# Patient Record
Sex: MALE | Race: WHITE | NOT HISPANIC OR LATINO | Employment: OTHER | URBAN - METROPOLITAN AREA
[De-identification: names, ages, dates, MRNs, and addresses within clinical notes are randomized per-mention and may not be internally consistent; named-entity substitution may affect disease eponyms.]

---

## 2017-01-24 ENCOUNTER — TRANSFERRED RECORDS (OUTPATIENT)
Dept: HEALTH INFORMATION MANAGEMENT | Facility: CLINIC | Age: 62
End: 2017-01-24

## 2017-03-24 DIAGNOSIS — K21.9 GASTROESOPHAGEAL REFLUX DISEASE WITHOUT ESOPHAGITIS: ICD-10-CM

## 2017-03-24 NOTE — TELEPHONE ENCOUNTER
omeprazole (PRILOSEC) 20 MG capsule      Last Written Prescription Date: 04-  Last Fill Quantity: 90,  # refills: 3   Last Office Visit with [facility], [facility] or [facility] prescribing provider: 11-

## 2017-03-27 NOTE — TELEPHONE ENCOUNTER
Prescription approved per [facility] Refill Protocol.  Madeleine Blevins, RN  Triage Nurse

## 2017-05-26 DIAGNOSIS — I10 ESSENTIAL HYPERTENSION: ICD-10-CM

## 2017-05-26 NOTE — TELEPHONE ENCOUNTER
lisinopril (PRINIVIL,ZESTRIL) 10 MG tablet      Last Written Prescription Date: 04-  Last Fill Quantity: 90, # refills: 3  Last Office Visit with G, P or [facility] prescribing provider: 11-       Potassium   Date Value Ref Range Status   04/27/2016 3.9 3.4 - 5.3 mmol/L Final     Creatinine   Date Value Ref Range Status   04/27/2016 0.83 0.66 - 1.25 mg/dL Final     BP Readings from Last 3 Encounters:   11/15/16 110/72   10/18/16 110/72   04/27/16 124/76

## 2017-05-31 RX ORDER — LISINOPRIL 10 MG/1
10 TABLET ORAL DAILY
Qty: 30 TABLET | Refills: 0 | Status: SHIPPED | OUTPATIENT
Start: 2017-05-31 | End: 2017-07-16

## 2017-05-31 NOTE — TELEPHONE ENCOUNTER
Medication is being filled for 1 time refill only due to:  Patient needs labs potassium and creatinine-future orders placed. Please call pt to schedule lab only appt.     Crystal Vasquez RN

## 2017-06-08 DIAGNOSIS — I10 ESSENTIAL HYPERTENSION: ICD-10-CM

## 2017-06-08 LAB
ALBUMIN SERPL-MCNC: 3.8 G/DL (ref 3.4–5)
ALP SERPL-CCNC: 62 U/L (ref 40–150)
ALT SERPL W P-5'-P-CCNC: 27 U/L (ref 0–70)
ANION GAP SERPL CALCULATED.3IONS-SCNC: 9 MMOL/L (ref 3–14)
AST SERPL W P-5'-P-CCNC: 26 U/L (ref 0–45)
BILIRUB SERPL-MCNC: 0.5 MG/DL (ref 0.2–1.3)
BUN SERPL-MCNC: 13 MG/DL (ref 7–30)
CALCIUM SERPL-MCNC: 8.4 MG/DL (ref 8.5–10.1)
CHLORIDE SERPL-SCNC: 106 MMOL/L (ref 94–109)
CO2 SERPL-SCNC: 24 MMOL/L (ref 20–32)
CREAT SERPL-MCNC: 0.83 MG/DL (ref 0.66–1.25)
GFR SERPL CREATININE-BSD FRML MDRD: ABNORMAL ML/MIN/1.7M2
GLUCOSE SERPL-MCNC: 99 MG/DL (ref 70–99)
POTASSIUM SERPL-SCNC: 4 MMOL/L (ref 3.4–5.3)
PROT SERPL-MCNC: 7.7 G/DL (ref 6.8–8.8)
SODIUM SERPL-SCNC: 139 MMOL/L (ref 133–144)

## 2017-06-08 PROCEDURE — 80053 COMPREHEN METABOLIC PANEL: CPT | Performed by: INTERNAL MEDICINE

## 2017-06-08 PROCEDURE — 36415 COLL VENOUS BLD VENIPUNCTURE: CPT | Performed by: INTERNAL MEDICINE

## 2017-06-13 ENCOUNTER — ALLIED HEALTH/NURSE VISIT (OUTPATIENT)
Dept: NURSING | Facility: CLINIC | Age: 62
End: 2017-06-13
Payer: COMMERCIAL

## 2017-06-13 DIAGNOSIS — Z23 NEED FOR DTAP VACCINE: Primary | ICD-10-CM

## 2017-06-13 PROCEDURE — 99207 ZZC NO CHARGE NURSE ONLY: CPT

## 2017-06-13 PROCEDURE — 90715 TDAP VACCINE 7 YRS/> IM: CPT

## 2017-06-13 PROCEDURE — 90471 IMMUNIZATION ADMIN: CPT

## 2017-06-13 NOTE — PROGRESS NOTES
Screening Questionnaire for Adult Immunization    Are you sick today?   No   Do you have allergies to medications, food, a vaccine component or latex?   No   Have you ever had a serious reaction after receiving a vaccination?   No   Do you have a long-term health problem with heart disease, lung disease, asthma, kidney disease, metabolic disease (e.g. diabetes), anemia, or other blood disorder?   No   Do you have cancer, leukemia, HIV/AIDS, or any other immune system problem?   No   In the past 3 months, have you taken medications that affect  your immune system, such as prednisone, other steroids, or anticancer drugs; drugs for the treatment of rheumatoid arthritis, Crohn s disease, or psoriasis; or have you had radiation treatments?   No   Have you had a seizure, or a brain or other nervous system problem?   No   During the past year, have you received a transfusion of blood or blood     products, or been given immune (gamma) globulin or antiviral drug?   No   For women: Are you pregnant or is there a chance you could become        pregnant during the next month?   No   Have you received any vaccinations in the past 4 weeks?   No     Immunization questionnaire answers were all negative.      MNVFC doesn't apply on this patient    Per orders of Dr. Chris, injection of adacel given by Shalini Owens. Patient instructed to remain in clinic for 20 minutes afterwards, and to report any adverse reaction to me immediately.       Screening performed by Shalini Owens on 6/13/2017 at 8:43 AM.

## 2017-06-13 NOTE — MR AVS SNAPSHOT
After Visit Summary   6/13/2017    Manuel Schofield    MRN: [id number]           Patient Information     Date Of Birth          1955        Visit Information        Provider Department      6/13/2017 8:30 AM ABHIJEET NURSE [facility] Cheryl        Today's Diagnoses     Need for DTaP vaccine    -  1       Follow-ups after your visit        Who to contact     If you have questions or need follow up information about today's clinic visit or your schedule please contact [facility] directly at [phone].  Normal or non-critical lab and imaging results will be communicated to you by [employer], letter or phone within 4 business days after the clinic has received the results. If you do not hear from us within 7 days, please contact the clinic through [employer] or phone. If you have a critical or abnormal lab result, we will notify you by phone as soon as possible.  Submit refill requests through [employer] or call your pharmacy and they will forward the refill request to us. Please allow 3 business days for your refill to be completed.          Additional Information About Your Visit        MyChart Information     [employer] gives you secure access to your electronic health record. If you see a primary care provider, you can also send messages to your care team and make appointments. If you have questions, please call your primary care clinic.  If you do not have a primary care provider, please call [phone] and they will assist you.        Care EveryWhere ID     This is your Care EveryWhere ID. This could be used by other organizations to access your [facility] medical records  [id number]         Blood Pressure from Last 3 Encounters:   11/15/16 110/72   10/18/16 110/72   04/27/16 124/76    Weight from Last 3 Encounters:   11/15/16 254 lb (115.2 kg)   10/18/16 256 lb (116.1 kg)   04/27/16 251 lb 6.4 oz (114 kg)              We Performed the Following     TDAP VACCINE (ADACEL)        Primary Care  Provider Office Phone # Fax #    Vinod Chris [phone]       [facility] [address] [place] [address]        Thank you!     [facility]. Our goal is always to provide you with excellent care. Hearing back from our patients is one way we can continue to improve our services. Please take a few minutes to complete the written survey that you may receive in the mail after your visit with us. Thank you!             Your Updated Medication List - Protect others around you: Learn how to safely use, store and throw away your medicines at www.disposemymeds.org.          This list is accurate as of: 6/13/17  8:46 AM.  Always use your most recent med list.                   Brand Name Dispense Instructions for use    ALLEGRA PO          levothyroxine 200 MCG tablet    SYNTHROID/LEVOTHROID    90 tablet    Take 1 tablet (200 mcg) by mouth daily       lisinopril 10 MG tablet    PRINIVIL/ZESTRIL    30 tablet    Take 1 tablet (10 mg) by mouth daily       mesalamine 500 MG Cpcr CR capsule    PENTASA    540 capsule    Take 3 capsules (1,500 mg) by mouth 2 times daily       omeprazole 20 MG CR capsule    priLOSEC    90 capsule    Take 1 capsule (20 mg) by mouth daily

## 2017-07-16 DIAGNOSIS — I10 ESSENTIAL HYPERTENSION: ICD-10-CM

## 2017-07-16 NOTE — TELEPHONE ENCOUNTER
lisinopril (PRINIVIL/ZESTRIL) 10 MG tablet      Last Written Prescription Date: 05/31/2017  Last Fill Quantity: 30, # refills: 0  Last Office Visit with G, [facility] or [facility] prescribing provider: 11/15/2016         Potassium   Date Value Ref Range Status   06/08/2017 4.0 3.4 - 5.3 mmol/L Final     Creatinine   Date Value Ref Range Status   06/08/2017 0.83 0.66 - 1.25 mg/dL Final     BP Readings from Last 3 Encounters:   11/15/16 110/72   10/18/16 110/72   04/27/16 124/76

## 2017-07-17 RX ORDER — LISINOPRIL 10 MG/1
TABLET ORAL
Qty: 30 TABLET | Refills: 3 | Status: SHIPPED | OUTPATIENT
Start: 2017-07-17 | End: 2017-07-18

## 2017-07-17 NOTE — TELEPHONE ENCOUNTER
Prescription approved per [facility] Refill Protocol.    Due to see PCP 11/2017.    Chantell Correia, MSN, RN-BC  Care Coordinator

## 2017-07-18 DIAGNOSIS — I10 ESSENTIAL HYPERTENSION: ICD-10-CM

## 2017-07-18 NOTE — TELEPHONE ENCOUNTER
*PT REQUESTING A 90 DAY RX    lisinopril (PRINIVIL/ZESTRIL) 10 MG tablet      Last Written Prescription Date: 7/17/2017  Last Fill Quantity: 30, # refills: 3  Last Office Visit with G, [facility] or [facility] prescribing provider: 11/15/2016       Potassium   Date Value Ref Range Status   06/08/2017 4.0 3.4 - 5.3 mmol/L Final     Creatinine   Date Value Ref Range Status   06/08/2017 0.83 0.66 - 1.25 mg/dL Final     BP Readings from Last 3 Encounters:   11/15/16 110/72   10/18/16 110/72   04/27/16 124/76

## 2017-07-19 RX ORDER — LISINOPRIL 10 MG/1
10 TABLET ORAL DAILY
Qty: 90 TABLET | Refills: 0 | Status: SHIPPED | OUTPATIENT
Start: 2017-07-19 | End: 2017-08-30

## 2017-07-19 NOTE — TELEPHONE ENCOUNTER
Will forward to Dr. Jackson to see if she will give a 90 day refill?  Looks like at the last ov on 11/15/16,  Pt was to return in 2 weeks, has not been back.  Please advise.

## 2017-07-20 NOTE — TELEPHONE ENCOUNTER
Rx changed to 90 days. Due for office visit follow-up.    Carli Jackson MD  Internal Medicine/Pediatrics

## 2017-08-07 DIAGNOSIS — K52.9 INFLAMMATORY BOWEL DISEASE: ICD-10-CM

## 2017-08-07 NOTE — TELEPHONE ENCOUNTER
mesalamine (PENTASA) 500 MG CPCR      Last Written Prescription Date:  4/27/2016  Last Fill Quantity: 540,   # refills: 3  Last Office Visit with [facility], [facility] or [facility] prescribing provider: 11/15/2016  Future Office visit:       Routing refill request to provider for review/approval because:  Drug not on the [facility], [facility] or [facility] refill protocol or controlled substance

## 2017-08-08 RX ORDER — MESALAMINE 500 MG/1
CAPSULE ORAL
Qty: 180 CAPSULE | Refills: 0 | Status: SHIPPED | OUTPATIENT
Start: 2017-08-08 | End: 2017-08-30

## 2017-08-08 NOTE — TELEPHONE ENCOUNTER
Mesalamine is on refill protocol. CBC, TSH & physical is overdue. Will refill x 1 and update patient via [employer] message.        Lab Results   Component Value Date    ALT 27 06/08/2017     Creatinine   Date Value Ref Range Status   06/08/2017 0.83 0.66 - 1.25 mg/dL Final     Lab Results   Component Value Date    WBC 8.6 04/27/2016     Lab Results   Component Value Date    RBC 5.19 04/27/2016     Lab Results   Component Value Date    HGB 15.3 04/27/2016     Lab Results   Component Value Date    HCT 44.8 04/27/2016     No components found for: MCT  Lab Results   Component Value Date    MCV 86 04/27/2016     Lab Results   Component Value Date    MCH 29.5 04/27/2016     Lab Results   Component Value Date    MCHC 34.2 04/27/2016     Lab Results   Component Value Date    RDW 12.8 04/27/2016     Lab Results   Component Value Date     04/27/2016

## 2017-08-12 DIAGNOSIS — E89.0 POSTOPERATIVE HYPOTHYROIDISM: ICD-10-CM

## 2017-08-14 NOTE — TELEPHONE ENCOUNTER
levothyroxine (SYNTHROID, LEVOTHROID) 200 MCG tablet     Last Written Prescription Date: 9/19/2016  Last Quantity: 90, # refills: 3  Last Office Visit with [facility], [facility] or [facility] prescribing provider: 11/15/2016        TSH   Date Value Ref Range Status   04/27/2016 1.64 0.40 - 4.00 mU/L Final

## 2017-08-15 RX ORDER — LEVOTHYROXINE SODIUM 200 UG/1
TABLET ORAL
Qty: 30 TABLET | Refills: 0 | Status: SHIPPED | OUTPATIENT
Start: 2017-08-15 | End: 2017-08-30

## 2017-08-15 NOTE — TELEPHONE ENCOUNTER
Medication is being filled for 1 time refill only due to:  Patient needs labs TSH and PE..     Patient scheduled to see pcp 8/30.    Nikia Whitten RN

## 2017-08-30 ENCOUNTER — OFFICE VISIT (OUTPATIENT)
Dept: PEDIATRICS | Facility: CLINIC | Age: 62
End: 2017-08-30
Payer: COMMERCIAL

## 2017-08-30 VITALS
BODY MASS INDEX: 36.66 KG/M2 | DIASTOLIC BLOOD PRESSURE: 68 MMHG | OXYGEN SATURATION: 97 % | SYSTOLIC BLOOD PRESSURE: 112 MMHG | WEIGHT: 256.1 LBS | HEART RATE: 64 BPM | HEIGHT: 70 IN | RESPIRATION RATE: 14 BRPM

## 2017-08-30 DIAGNOSIS — I10 ESSENTIAL HYPERTENSION: ICD-10-CM

## 2017-08-30 DIAGNOSIS — E89.0 POSTOPERATIVE HYPOTHYROIDISM: ICD-10-CM

## 2017-08-30 DIAGNOSIS — Z12.5 ENCOUNTER FOR SCREENING FOR MALIGNANT NEOPLASM OF PROSTATE: ICD-10-CM

## 2017-08-30 DIAGNOSIS — G71.09 HEREDITARY PROGRESSIVE MUSCULAR DYSTROPHY (H): ICD-10-CM

## 2017-08-30 DIAGNOSIS — K52.9 INFLAMMATORY BOWEL DISEASE: ICD-10-CM

## 2017-08-30 DIAGNOSIS — Z80.42 FAMILY HISTORY OF PROSTATE CANCER: ICD-10-CM

## 2017-08-30 DIAGNOSIS — K21.9 GASTROESOPHAGEAL REFLUX DISEASE WITHOUT ESOPHAGITIS: ICD-10-CM

## 2017-08-30 DIAGNOSIS — M25.562 LEFT KNEE PAIN, UNSPECIFIED CHRONICITY: ICD-10-CM

## 2017-08-30 DIAGNOSIS — Z00.01 ENCOUNTER FOR ROUTINE ADULT HEALTH EXAMINATION WITH ABNORMAL FINDINGS: Primary | ICD-10-CM

## 2017-08-30 LAB
ALBUMIN SERPL-MCNC: 3.7 G/DL (ref 3.4–5)
ALP SERPL-CCNC: 53 U/L (ref 40–150)
ALT SERPL W P-5'-P-CCNC: 29 U/L (ref 0–70)
ANION GAP SERPL CALCULATED.3IONS-SCNC: 9 MMOL/L (ref 3–14)
AST SERPL W P-5'-P-CCNC: 23 U/L (ref 0–45)
BASOPHILS # BLD AUTO: 0.1 10E9/L (ref 0–0.2)
BASOPHILS NFR BLD AUTO: 0.9 %
BILIRUB SERPL-MCNC: 0.6 MG/DL (ref 0.2–1.3)
BUN SERPL-MCNC: 9 MG/DL (ref 7–30)
CALCIUM SERPL-MCNC: 8.6 MG/DL (ref 8.5–10.1)
CHLORIDE SERPL-SCNC: 105 MMOL/L (ref 94–109)
CHOLEST SERPL-MCNC: 176 MG/DL
CO2 SERPL-SCNC: 24 MMOL/L (ref 20–32)
CREAT SERPL-MCNC: 0.9 MG/DL (ref 0.66–1.25)
DIFFERENTIAL METHOD BLD: NORMAL
EOSINOPHIL # BLD AUTO: 0.3 10E9/L (ref 0–0.7)
EOSINOPHIL NFR BLD AUTO: 3.6 %
ERYTHROCYTE [DISTWIDTH] IN BLOOD BY AUTOMATED COUNT: 12.7 % (ref 10–15)
GFR SERPL CREATININE-BSD FRML MDRD: 85 ML/MIN/1.7M2
GLUCOSE SERPL-MCNC: 104 MG/DL (ref 70–99)
HCT VFR BLD AUTO: 45.1 % (ref 40–53)
HDLC SERPL-MCNC: 42 MG/DL
HGB BLD-MCNC: 15.4 G/DL (ref 13.3–17.7)
LDLC SERPL CALC-MCNC: 109 MG/DL
LYMPHOCYTES # BLD AUTO: 1.7 10E9/L (ref 0.8–5.3)
LYMPHOCYTES NFR BLD AUTO: 19 %
MCH RBC QN AUTO: 29.8 PG (ref 26.5–33)
MCHC RBC AUTO-ENTMCNC: 34.1 G/DL (ref 31.5–36.5)
MCV RBC AUTO: 87 FL (ref 78–100)
MONOCYTES # BLD AUTO: 1 10E9/L (ref 0–1.3)
MONOCYTES NFR BLD AUTO: 11.4 %
NEUTROPHILS # BLD AUTO: 5.7 10E9/L (ref 1.6–8.3)
NEUTROPHILS NFR BLD AUTO: 65.1 %
NONHDLC SERPL-MCNC: 134 MG/DL
PLATELET # BLD AUTO: 242 10E9/L (ref 150–450)
POTASSIUM SERPL-SCNC: 4.1 MMOL/L (ref 3.4–5.3)
PROT SERPL-MCNC: 7.7 G/DL (ref 6.8–8.8)
PSA SERPL-ACNC: 0.67 UG/L (ref 0–4)
RBC # BLD AUTO: 5.16 10E12/L (ref 4.4–5.9)
SODIUM SERPL-SCNC: 138 MMOL/L (ref 133–144)
TRIGL SERPL-MCNC: 123 MG/DL
TSH SERPL DL<=0.005 MIU/L-ACNC: 1.79 MU/L (ref 0.4–4)
WBC # BLD AUTO: 8.7 10E9/L (ref 4–11)

## 2017-08-30 PROCEDURE — 80061 LIPID PANEL: CPT | Performed by: INTERNAL MEDICINE

## 2017-08-30 PROCEDURE — G0103 PSA SCREENING: HCPCS | Performed by: INTERNAL MEDICINE

## 2017-08-30 PROCEDURE — 99396 PREV VISIT EST AGE 40-64: CPT | Performed by: INTERNAL MEDICINE

## 2017-08-30 PROCEDURE — 80050 GENERAL HEALTH PANEL: CPT | Performed by: INTERNAL MEDICINE

## 2017-08-30 PROCEDURE — 36415 COLL VENOUS BLD VENIPUNCTURE: CPT | Performed by: INTERNAL MEDICINE

## 2017-08-30 RX ORDER — LEVOTHYROXINE SODIUM 200 UG/1
200 TABLET ORAL DAILY
Qty: 90 TABLET | Refills: 3 | Status: SHIPPED | OUTPATIENT
Start: 2017-08-30 | End: 2018-09-19

## 2017-08-30 RX ORDER — LISINOPRIL 10 MG/1
10 TABLET ORAL DAILY
Qty: 90 TABLET | Refills: 3 | Status: SHIPPED | OUTPATIENT
Start: 2017-08-30 | End: 2018-09-19

## 2017-08-30 RX ORDER — MESALAMINE 500 MG/1
1500 CAPSULE, EXTENDED RELEASE ORAL 2 TIMES DAILY
Qty: 540 CAPSULE | Refills: 3 | Status: ON HOLD | OUTPATIENT
Start: 2017-08-30 | End: 2021-03-08

## 2017-08-30 NOTE — PROGRESS NOTES
SUBJECTIVE:   CC: Manuel Schofield is an 62 year old male who presents for preventative health visit.     Physical   Annual:     Getting at least 3 servings of Calcium per day::  NO    Bi-annual eye exam::  NO    Dental care twice a year::  Yes    Sleep apnea or symptoms of sleep apnea::  Daytime drowsiness and Excessive snoring    Diet::  Regular (no restrictions)    Frequency of exercise::  None    Taking medications regularly::  Yes    Medication side effects::  None    Additional concerns today::  YES    Concerns:  1) Patient also would like to discuss left knee pain - has been bothering him the last week or two.      Worse with activity      Today's PHQ-2 Score:   PHQ-2 ( 1999 Pf[employer]) 8/27/2017   Q1: Little interest or pleasure in doing things 0   Q2: Feeling down, depressed or hopeless 1   PHQ-2 Score 1   Q1: Little interest or pleasure in doing things Not at all   Q2: Feeling down, depressed or hopeless Several days   PHQ-2 Score 1       Abuse: Current or Past(Physical, Sexual or Emotional)- No  Do you feel safe in your environment - Yes    Social History   Substance Use Topics     Smoking status: Former Smoker     Packs/day: 1.00     Years: 40.00     Types: Cigarettes     Smokeless tobacco: Never Used     Alcohol use 0.0 oz/week     0 Standard drinks or equivalent per week      Comment: rare     The patient does not drink >3 drinks per day nor >7 drinks per week.    Last PSA:   PSA   Date Value Ref Range Status   08/30/2017 0.67 0 - 4 ug/L Final     Comment:     Assay Method:  Chemiluminescence using Siemens Vista analyzer       Reviewed orders with patient. Reviewed health maintenance and updated orders accordingly - Yes    Reviewed and updated as needed this visit by clinical staff  Tobacco  Allergies  Meds  Med Hx  Surg Hx  Fam Hx  Soc Hx      Patient Active Problem List   Diagnosis     Hereditary progressive muscular dystrophy (H)     Tinnitus     CARDIOVASCULAR SCREENING; LDL GOAL LESS THAN 130      Health Care Home     Advanced directives, counseling/discussion     Family history of prostate cancer     Obesity     Inflammatory bowel disease     Postoperative hypothyroidism     Gastroesophageal reflux disease without esophagitis     Benign essential hypertension     Past Medical History:   Diagnosis Date     Chest pain, unspecified 10/04    adenosine cardiolyte:ECG negative, no ischemia, EF 59%     Esophageal reflux     EGD 2005 normal     Hereditary progressive muscular dystrophy (H)      Internal hemorrhoids without mention of complication      Unspecified tinnitus     chronic, ENT evaluation       Past Surgical History:   Procedure Laterality Date     APPENDECTOMY       CHOLECYSTECTOMY       COLONOSCOPY  7/8/2013    Colonoscopy Dr. Pruitt [facility]     COLONOSCOPY  7/8/2013    Procedure: COLONOSCOPY;  Colonoscopy ;  Surgeon: Vinod Pruitt MD;  Location:  GI     ENT SURGERY      T&A as a child     HC THYROID LOBECTOMY,UNILAT  1990     HC THYROID LOBECTOMY,UNILAT  2008    resection goiter       Family History   Problem Relation Age of Onset     HEART DISEASE Mother      Cancer - colorectal Mother      Alzheimer Disease Father      Neurologic Disorder Father      GASTROINTESTINAL DISEASE Brother      colon polyps     DIABETES Sister      DIABETES Brother        ALLERGIES     Allergies   Allergen Reactions     Peanut-Derived      Seasonal Allergies        ROS:  C: NEGATIVE for fever, chills, change in weight  I: NEGATIVE for worrisome rashes, moles or lesions  E: NEGATIVE for vision changes or irritation  ENT: NEGATIVE for ear, mouth and throat problems  R: NEGATIVE for significant cough or SOB  CV: NEGATIVE for chest pain, palpitations or peripheral edema  GI: NEGATIVE for nausea, abdominal pain, heartburn, or change in bowel habits   male: negative for dysuria, hematuria, decreased urinary stream, erectile dysfunction, urethral discharge  M: see hpi  N: chronic upper extrem weakness, stable  P: NEGATIVE  "for changes in mood or affect    Current Outpatient Prescriptions   Medication Sig Dispense Refill     mesalamine (PENTASA) 500 MG CPCR CR capsule Take 3 capsules (1,500 mg) by mouth 2 times daily 540 capsule 3     lisinopril (PRINIVIL/ZESTRIL) 10 MG tablet Take 1 tablet (10 mg) by mouth daily 90 tablet 3     omeprazole (PRILOSEC) 20 MG CR capsule Take 1 capsule (20 mg) by mouth daily 90 capsule 3     levothyroxine (SYNTHROID/LEVOTHROID) 200 MCG tablet TAKE 1 TABLET BY MOUTH EVERY DAY 30 tablet 0     Fexofenadine HCl (ALLEGRA PO)          OBJECTIVE:   /68 (BP Location: Right arm, Patient Position: Chair, Cuff Size: Adult Regular)  Pulse 64  Resp 14  Ht 5' 9.75\" (1.772 m)  Wt 256 lb 1.6 oz (116.2 kg)  SpO2 97%  BMI 37.01 kg/m2    EXAM:  GENERAL: alert and no distress  EYES: Eyes grossly normal to inspection, PERRL and conjunctivae and sclerae normal  HENT: ear canals and TM's normal, nose and mouth without ulcers or lesions  NECK: no adenopathy, no asymmetry, masses, or scars and thyroid normal to palpation  RESP: lungs clear to auscultation - no rales, rhonchi or wheezes  CV: regular rate and rhythm, normal S1 S2, no S3 or S4, no murmur, click or rub, no peripheral edema   ABDOMEN: soft, nontender, no hepatosplenomegaly, no masses and bowel sounds normal  MS: b/l upper extrem atrophic changes  SKIN: no suspicious lesions or rashes  NEURO: b/l upper extrem neuromuscular weakness, mainly affects fingers/hands/wrists  PSYCH: mentation appears normal, affect normal/bright    ASSESSMENT/PLAN:       ICD-10-CM    1. Encounter for routine adult health examination with abnormal findings Z00.01        2. Hereditary progressive muscular dystrophy (H) G71.0 Neurologic deficits have been stable with little progression     3. Essential hypertension I10 lisinopril (PRINIVIL/ZESTRIL) 10 MG tablet     Comprehensive metabolic panel     Lipid panel reflex to direct LDL      Adequate control.  Continue current regimen " "without changes.      4. Inflammatory bowel disease K52.9 mesalamine (PENTASA) 500 MG CPCR CR capsule     CBC with platelets and differential      In remission, on Pentasa     5. Postoperative hypothyroidism E89.0 TSH with free T4 reflex     levothyroxine (SYNTHROID/LEVOTHROID) 200 MCG tablet         Lab Results   Component Value Date    TSH 1.79 08/30/2017      Adequate control.  Continue current regimen without changes.      6. Gastroesophageal reflux disease without esophagitis K21.9 omeprazole (PRILOSEC) 20 MG CR capsule        Sx well controlled, continue PPI     7. Left knee pain, unspecified chronicity M25.562 ORTHO  REFERRAL        rec course of PT, patient declines/requests referral to orthopedics     8. Family history of prostate cancer Z80.42    9. Encounter for screening for malignant neoplasm of prostate  Z12.5 PSA, screen       COUNSELING:   Reviewed preventive health counseling, as reflected in patient instructions       Regular exercise       Healthy diet/nutrition       Colon cancer screening       Prostate cancer screening       reports that he has quit smoking. His smoking use included Cigarettes. He has a 40.00 pack-year smoking history. He has never used smokeless tobacco.      Estimated body mass index is 37.01 kg/(m^2) as calculated from the following:    Height as of this encounter: 5' 9.75\" (1.772 m).    Weight as of this encounter: 256 lb 1.6 oz (116.2 kg).   Weight management plan: Discussed healthy diet and exercise guidelines and patient will follow up in 12 months in clinic to re-evaluate.    Counseling Resources:  ATP IV Guidelines  Pooled Cohorts Equation Calculator  FRAX Risk Assessment  ICSI Preventive Guidelines  Dietary Guidelines for Americans, 2010  USDA's MyPlate  ASA Prophylaxis  Lung CA Screening    Vinod Chris MD, MD  [facility]  "

## 2017-08-30 NOTE — NURSING NOTE
"Chief Complaint   Patient presents with     Physical       Initial /68 (BP Location: Right arm, Patient Position: Chair, Cuff Size: Adult Regular)  Pulse 64  Resp 14  Ht 5' 9.75\" (1.772 m)  Wt 256 lb 1.6 oz (116.2 kg)  SpO2 97%  BMI 37.01 kg/m2 Estimated body mass index is 37.01 kg/(m^2) as calculated from the following:    Height as of this encounter: 5' 9.75\" (1.772 m).    Weight as of this encounter: 256 lb 1.6 oz (116.2 kg).  Medication Reconciliation: complete  Salome Archer CMA  "

## 2017-08-30 NOTE — MR AVS SNAPSHOT
After Visit Summary   8/30/2017    Manuel Schofield    MRN: [id number]           Patient Information     Date Of Birth          1955        Visit Information        Provider Department      8/30/2017 7:00 AM Vinod Chris MD [facility]        Today's Diagnoses     Encounter for routine adult health examination with abnormal findings    -  1    Hereditary progressive muscular dystrophy (H)        Essential hypertension        Inflammatory bowel disease        Postoperative hypothyroidism        Gastroesophageal reflux disease without esophagitis        Left knee pain, unspecified chronicity        Family history of prostate cancer        Encounter for screening for malignant neoplasm of prostate           Care Instructions      Preventive Health Recommendations  Male Ages 50 - 64    Yearly exam:             See your health care provider every year in order to  o   Review health changes.   o   Discuss preventive care.    o   Review your medicines if your doctor has prescribed any.     Have a cholesterol test every 5 years, or more frequently if you are at risk for high cholesterol/heart disease.     Have a diabetes test (fasting glucose) every three years. If you are at risk for diabetes, you should have this test more often.     Have a colonoscopy at age 50, or have a yearly FIT test (stool test). These exams will check for colon cancer.      Talk with your health care provider about whether or not a prostate cancer screening test (PSA) is right for you.    You should be tested each year for STDs (sexually transmitted diseases), if you re at risk.     Shots: Get a flu shot each year. Get a tetanus shot every 10 years.     Nutrition:    Eat at least 5 servings of fruits and vegetables daily.     Eat whole-grain bread, whole-wheat pasta and brown rice instead of white grains and rice.     Talk to your provider about Calcium and Vitamin D.     Lifestyle    Exercise for at least 150  minutes a week (30 minutes a day, 5 days a week). This will help you control your weight and prevent disease.     Limit alcohol to one drink per day.     No smoking.     Wear sunscreen to prevent skin cancer.     See your dentist every six months for an exam and cleaning.     See your eye doctor every 1 to 2 years.            Follow-ups after your visit        Additional Services     ORTHO  REFERRAL       [facility] is referring you to the Orthopedic  Services at [facility].       The  Representative will assist you in the coordination of your Orthopedic and Musculoskeletal Care as prescribed by your physician.    The  Representative will call you within 1 business day to help schedule your appointment, or you may contact the  Representative at:    All areas ~ [phone]     Type of Referral : Surgical / Specialist       Timeframe requested: 3 - 5 days    Coverage of these services is subject to the terms and limitations of your health insurance plan.  Please call member services at your health plan with any benefit or coverage questions.      If X-rays, CT or MRI's have been performed, please contact the facility where they were done to arrange for , prior to your scheduled appointment.  Please bring this referral request to your appointment and present it to your specialist.                  Who to contact     If you have questions or need follow up information about today's clinic visit or your schedule please contact [facility] directly at [phone].  Normal or non-critical lab and imaging results will be communicated to you by MyChart, letter or phone within 4 business days after the clinic has received the results. If you do not hear from us within 7 days, please contact the clinic through MyChart or phone. If you have a critical or abnormal lab result, we will notify you by phone as soon as  "possible.  Submit refill requests through [employer] or call your pharmacy and they will forward the refill request to us. Please allow 3 business days for your refill to be completed.          Additional Information About Your Visit        [employer] Information     [employer] gives you secure access to your electronic health record. If you see a primary care provider, you can also send messages to your care team and make appointments. If you have questions, please call your primary care clinic.  If you do not have a primary care provider, please call [phone] and they will assist you.        Care EveryWhere ID     This is your Care EveryWhere ID. This could be used by other organizations to access your [place] medical records  [id number]        Your Vitals Were     Pulse Respirations Height Pulse Oximetry BMI (Body Mass Index)       64 14 5' 9.75\" (1.772 m) 97% 37.01 kg/m2        Blood Pressure from Last 3 Encounters:   08/30/17 112/68   11/15/16 110/72   10/18/16 110/72    Weight from Last 3 Encounters:   08/30/17 256 lb 1.6 oz (116.2 kg)   11/15/16 254 lb (115.2 kg)   10/18/16 256 lb (116.1 kg)              We Performed the Following     CBC with platelets and differential     Comprehensive metabolic panel     Lipid panel reflex to direct LDL     ORTHO  REFERRAL     PSA, screen     TSH with free T4 reflex          Today's Medication Changes          These changes are accurate as of: 8/30/17  7:24 AM.  If you have any questions, ask your nurse or doctor.               These medicines have changed or have updated prescriptions.        Dose/Directions    mesalamine 500 MG Cpcr CR capsule   Commonly known as:  PENTASA   This may have changed:  See the new instructions.   Used for:  Inflammatory bowel disease   Changed by:  Vinod Chris MD        Dose:  1500 mg   Take 3 capsules (1,500 mg) by mouth 2 times daily   Quantity:  540 capsule   Refills:  3            Where to get your medicines      These " medications were sent to [facility]/pharmacy #[id number] - [place], [place] - [address]  [address] [address]     Phone:  [phone]     lisinopril 10 MG tablet    mesalamine 500 MG Cpcr CR capsule    omeprazole 20 MG CR capsule                Primary Care Provider Office Phone # Fax #    Vinod Chris [phone] [address] [place] [address]        Equal Access to Services     [facility]: Hadii aad ku hadasho Soomaali, waaxda luqadaha, qaybta kaalmada adeegyada, waxay idiin hayaan adeeg kharash la'colemann . [facility] [phone].    ATENCIÓN: Si habla español, tiene a cook disposición servicios gratuitos de asistencia lingüística. [facility] [phone].    We comply with applicable federal civil rights laws and [place] laws. We do not discriminate on the basis of race, color, national origin, age, disability sex, sexual orientation or gender identity.            Thank you!     Thank you for choosing [facility]  for your care. Our goal is always to provide you with excellent care. Hearing back from our patients is one way we can continue to improve our services. Please take a few minutes to complete the written survey that you may receive in the mail after your visit with us. Thank you!             Your Updated Medication List - Protect others around you: Learn how to safely use, store and throw away your medicines at www.disposemymeds.org.          This list is accurate as of: 8/30/17  7:24 AM.  Always use your most recent med list.                   Brand Name Dispense Instructions for use Diagnosis    ALLEGRA PO           levothyroxine 200 MCG tablet    SYNTHROID/LEVOTHROID    30 tablet    TAKE 1 TABLET BY MOUTH EVERY DAY    Postoperative hypothyroidism       lisinopril 10 MG tablet    PRINIVIL/ZESTRIL    90 tablet    Take 1 tablet (10 mg) by mouth daily    Essential hypertension       mesalamine 500 MG Cpcr CR capsule    PENTASA    540 capsule     Take 3 capsules (1,500 mg) by mouth 2 times daily    Inflammatory bowel disease       omeprazole 20 MG CR capsule    priLOSEC    90 capsule    Take 1 capsule (20 mg) by mouth daily    Gastroesophageal reflux disease without esophagitis

## 2017-09-01 ENCOUNTER — OFFICE VISIT (OUTPATIENT)
Dept: ORTHOPEDICS | Facility: CLINIC | Age: 62
End: 2017-09-01
Payer: COMMERCIAL

## 2017-09-01 ENCOUNTER — RADIANT APPOINTMENT (OUTPATIENT)
Dept: GENERAL RADIOLOGY | Facility: CLINIC | Age: 62
End: 2017-09-01
Attending: ORTHOPAEDIC SURGERY
Payer: COMMERCIAL

## 2017-09-01 VITALS
HEIGHT: 70 IN | DIASTOLIC BLOOD PRESSURE: 78 MMHG | BODY MASS INDEX: 36.65 KG/M2 | SYSTOLIC BLOOD PRESSURE: 128 MMHG | WEIGHT: 256 LBS

## 2017-09-01 DIAGNOSIS — M25.562 LEFT KNEE PAIN, UNSPECIFIED CHRONICITY: ICD-10-CM

## 2017-09-01 DIAGNOSIS — M17.12 PRIMARY OSTEOARTHRITIS OF LEFT KNEE: ICD-10-CM

## 2017-09-01 DIAGNOSIS — M25.562 LEFT KNEE PAIN, UNSPECIFIED CHRONICITY: Primary | ICD-10-CM

## 2017-09-01 PROCEDURE — 20610 DRAIN/INJ JOINT/BURSA W/O US: CPT | Mod: LT | Performed by: ORTHOPAEDIC SURGERY

## 2017-09-01 PROCEDURE — 99203 OFFICE O/P NEW LOW 30 MIN: CPT | Mod: 25 | Performed by: ORTHOPAEDIC SURGERY

## 2017-09-01 PROCEDURE — 73562 X-RAY EXAM OF KNEE 3: CPT | Mod: LT

## 2017-09-01 NOTE — LETTER
9/1/2017         RE: Manuel Schofield  [address]  [facility] [address]        Dear Colleague,    Thank you for referring your patient, Manuel Schofield, to the [facility]. Please see a copy of my visit note below.    HISTORY OF PRESENT ILLNESS:    Manuel Schofield is a 62 year old male who is seen in consultation at the request of Dr. Chris for Left knee pain    Present symptoms: Pt states knee pain has been present for the past 2 weeks, feels like the knee will hyperextend and be painful, feels like knee will give out.  Pt states pain has improved the last few days.  Treatments tried to this point: OTC Medication:  Ibuprofen (Advil)   Orthopedic PMH: no ortho surgeries     Past Medical History:   Diagnosis Date     Chest pain, unspecified 10/04    adenosine cardiolyte:ECG negative, no ischemia, EF 59%     Esophageal reflux     EGD 2005 normal     Hereditary progressive muscular dystrophy (H)      Internal hemorrhoids without mention of complication      Unspecified tinnitus     chronic, ENT evaluation       Past Surgical History:   Procedure Laterality Date     APPENDECTOMY       CHOLECYSTECTOMY       COLONOSCOPY  7/8/2013    [facility]     COLONOSCOPY  7/8/2013    Procedure: COLONOSCOPY;  Colonoscopy ;  Surgeon: Vinod Pruitt MD;  Location:  GI     ENT SURGERY      T&A as a child     HC THYROID LOBECTOMY,UNILAT  1990     HC THYROID LOBECTOMY,UNILAT  2008    resection goiter       Family History   Problem Relation Age of Onset     HEART DISEASE Mother      Cancer - colorectal Mother      Alzheimer Disease Father      Neurologic Disorder Father      GASTROINTESTINAL DISEASE Brother      colon polyps     DIABETES Sister      DIABETES Brother        Social History     Social History     Marital status:      Spouse name: N/A     Number of children: N/A     Years of education: N/A     Occupational History     Not on file.     Social History Main Topics      "Smoking status: Former Smoker     Packs/day: 1.00     Years: 40.00     Types: Cigarettes     Smokeless tobacco: Never Used     Alcohol use 0.0 oz/week     0 Standard drinks or equivalent per week      Comment: rare     Drug use: No     Sexual activity: Yes     Partners: Female     Other Topics Concern     Not on file     Social History Narrative       Current Outpatient Prescriptions   Medication Sig Dispense Refill     mesalamine (PENTASA) 500 MG CPCR CR capsule Take 3 capsules (1,500 mg) by mouth 2 times daily 540 capsule 3     lisinopril (PRINIVIL/ZESTRIL) 10 MG tablet Take 1 tablet (10 mg) by mouth daily 90 tablet 3     omeprazole (PRILOSEC) 20 MG CR capsule Take 1 capsule (20 mg) by mouth daily 90 capsule 3     levothyroxine (SYNTHROID/LEVOTHROID) 200 MCG tablet Take 1 tablet (200 mcg) by mouth daily 90 tablet 3     Fexofenadine HCl (ALLEGRA PO)          Allergies   Allergen Reactions     Peanut-Derived      Seasonal Allergies        REVIEW OF SYSTEMS:  CONSTITUTIONAL:  NEGATIVE for fever, chills, change in weight  INTEGUMENTARY/SKIN:  NEGATIVE for worrisome rashes, moles or lesions  EYES:  NEGATIVE for vision changes or irritation  ENT/MOUTH:  NEGATIVE for ear, mouth and throat problems  RESP:  NEGATIVE for significant cough or SOB  BREAST:  NEGATIVE for masses, tenderness or discharge  CV:  NEGATIVE for chest pain, palpitations or peripheral edema  GI:  NEGATIVE for nausea, abdominal pain, heartburn, or change in bowel habits  :  Negative   MUSCULOSKELETAL:  See HPI above  NEURO:  NEGATIVE for weakness, dizziness or paresthesias  ENDOCRINE:  NEGATIVE for temperature intolerance, skin/hair changes  HEME/ALLERGY/IMMUNE:  NEGATIVE for bleeding problems  PSYCHIATRIC:  NEGATIVE for changes in mood or affect      PHYSICAL EXAM:  /78 (BP Location: Left arm, Patient Position: Sitting, Cuff Size: Adult Regular)  Ht 5' 9.75\" (1.772 m)  Wt 256 lb (116.1 kg)  BMI 37 kg/m2  Body mass index is 37 kg/(m^2). "   GENERAL APPEARANCE: healthy, alert and no distress   SKIN: no suspicious lesions or rashes  NEURO: Normal strength and tone, mentation intact and speech normal  VASCULAR: Good pulses, and capillary refill   LYMPH: no lymphadenopathy   PSYCH:  mentation appears normal and affect normal/bright    MSK:  A&OX3, NAD  Neck supple, no lymphadenopathy  The patient ambulates without an antalgic gait.  The patient is able to get on and off the exam table without difficulty.      Examination of the spine reveals a normal lordosis to the cervical and lumbar spine, and a normal kyphosis to the thoracic spine.  There is no clinical evidence of scoliosis.    The pelvis is clinically level.  Trendelenberg is negative.  The patient is non-tender to palpation over the greater trochanteric bursal region or piriformis fossa.  With the hip flexed to 90 degrees, internal and external rotation is 30/60 respectively,  with no pain .      KNEE: Examination of the left knee reveals the lower extremity to have a Normal anatomic alignment.  There is no erythema, ecchymosis nor edema.  There is minimal effusion present clinically.  There is no significant warmth.  Range of motion is 0 to 125 degrees, without crepitus.  There is mild tenderness to palpation at the medial joint line.  Adriana's is negative without crepitus. The knee is ligamentously stable. Patello-femoral grind test is positive.      The calves and thighs are symmetric, without atrophy and non-tender to palpation. Анна's sign is negative, bilaterally.   CMS is intact to the toes.        ASSESSMENT / PLAN: Primary osteoarthritis left knee. I offered him a corticosteroid injection, which he accepted.      Procedure Note:  After risks and benefits were explained and questions answered, pt agreed to undergo a left knee injection. Using aseptic technique and a inferolateral parapatellar tendon approach, the joint space was infiltrated with 4 mg of Dexamethasone, 40 mg of Depo  Medrol, and 3 cc of 1% Lidocaine.  There were no complications and the patient tolerated the procedure well.    Imaging Interpretation:         James New MD  Department of Orthopedic Surgery        Disclaimer: This note consists of symbols derived from keyboarding, dictation and/or voice recognition software. As a result, there may be errors in the script that have gone undetected. Please consider this when interpreting information found in this chart.      Again, thank you for allowing me to participate in the care of your patient.        Sincerely,        Allan New MD

## 2017-09-01 NOTE — PROGRESS NOTES
HISTORY OF PRESENT ILLNESS:    Manuel Schofield is a 62 year old male who is seen in consultation at the request of Dr. Chris for Left knee pain    Present symptoms: Pt states knee pain has been present for the past 2 weeks, feels like the knee will hyperextend and be painful, feels like knee will give out.  Pt states pain has improved the last few days.  Treatments tried to this point: OTC Medication:  Ibuprofen (Advil)   Orthopedic PMH: no ortho surgeries     Past Medical History:   Diagnosis Date     Chest pain, unspecified 10/04    adenosine cardiolyte:ECG negative, no ischemia, EF 59%     Esophageal reflux     EGD 2005 normal     Hereditary progressive muscular dystrophy (H)      Internal hemorrhoids without mention of complication      Unspecified tinnitus     chronic, ENT evaluation       Past Surgical History:   Procedure Laterality Date     APPENDECTOMY       CHOLECYSTECTOMY       COLONOSCOPY  7/8/2013    [facility]oscopy Dr[facility]     COLONOSCOPY  7/8/2013    Procedure: COLONOSCOPY;  Colonoscopy ;  Surgeon: Vinod Pruitt MD;  Location:  GI     ENT SURGERY      T&A as a child      THYROID LOBECTOMY,UNILAT  1990     HC THYROID LOBECTOMY,UNILAT  2008    resection goiter       Family History   Problem Relation Age of Onset     HEART DISEASE Mother      Cancer - colorectal Mother      Alzheimer Disease Father      Neurologic Disorder Father      GASTROINTESTINAL DISEASE Brother      colon polyps     DIABETES Sister      DIABETES Brother        Social History     Social History     Marital status:      Spouse name: N/A     Number of children: N/A     Years of education: N/A     Occupational History     Not on file.     Social History Main Topics     Smoking status: Former Smoker     Packs/day: 1.00     Years: 40.00     Types: Cigarettes     Smokeless tobacco: Never Used     Alcohol use 0.0 oz/week     0 Standard drinks or equivalent per week      Comment: rare     Drug use: No     Sexual activity:  "Yes     Partners: Female     Other Topics Concern     Not on file     Social History Narrative       Current Outpatient Prescriptions   Medication Sig Dispense Refill     mesalamine (PENTASA) 500 MG CPCR CR capsule Take 3 capsules (1,500 mg) by mouth 2 times daily 540 capsule 3     lisinopril (PRINIVIL/ZESTRIL) 10 MG tablet Take 1 tablet (10 mg) by mouth daily 90 tablet 3     omeprazole (PRILOSEC) 20 MG CR capsule Take 1 capsule (20 mg) by mouth daily 90 capsule 3     levothyroxine (SYNTHROID/LEVOTHROID) 200 MCG tablet Take 1 tablet (200 mcg) by mouth daily 90 tablet 3     Fexofenadine HCl (ALLEGRA PO)          Allergies   Allergen Reactions     Peanut-Derived      Seasonal Allergies        REVIEW OF SYSTEMS:  CONSTITUTIONAL:  NEGATIVE for fever, chills, change in weight  INTEGUMENTARY/SKIN:  NEGATIVE for worrisome rashes, moles or lesions  EYES:  NEGATIVE for vision changes or irritation  ENT/MOUTH:  NEGATIVE for ear, mouth and throat problems  RESP:  NEGATIVE for significant cough or SOB  BREAST:  NEGATIVE for masses, tenderness or discharge  CV:  NEGATIVE for chest pain, palpitations or peripheral edema  GI:  NEGATIVE for nausea, abdominal pain, heartburn, or change in bowel habits  :  Negative   MUSCULOSKELETAL:  See HPI above  NEURO:  NEGATIVE for weakness, dizziness or paresthesias  ENDOCRINE:  NEGATIVE for temperature intolerance, skin/hair changes  HEME/ALLERGY/IMMUNE:  NEGATIVE for bleeding problems  PSYCHIATRIC:  NEGATIVE for changes in mood or affect      PHYSICAL EXAM:  /78 (BP Location: Left arm, Patient Position: Sitting, Cuff Size: Adult Regular)  Ht 5' 9.75\" (1.772 m)  Wt 256 lb (116.1 kg)  BMI 37 kg/m2  Body mass index is 37 kg/(m^2).   GENERAL APPEARANCE: healthy, alert and no distress   SKIN: no suspicious lesions or rashes  NEURO: Normal strength and tone, mentation intact and speech normal  VASCULAR: Good pulses, and capillary refill   LYMPH: no lymphadenopathy   PSYCH:  mentation " appears normal and affect normal/bright    MSK:  A&OX3, NAD  Neck supple, no lymphadenopathy  The patient ambulates without an antalgic gait.  The patient is able to get on and off the exam table without difficulty.      Examination of the spine reveals a normal lordosis to the cervical and lumbar spine, and a normal kyphosis to the thoracic spine.  There is no clinical evidence of scoliosis.    The pelvis is clinically level.  Trendelenberg is negative.  The patient is non-tender to palpation over the greater trochanteric bursal region or piriformis fossa.  With the hip flexed to 90 degrees, internal and external rotation is 30/60 respectively,  with no pain .      KNEE: Examination of the left knee reveals the lower extremity to have a Normal anatomic alignment.  There is no erythema, ecchymosis nor edema.  There is minimal effusion present clinically.  There is no significant warmth.  Range of motion is 0 to 125 degrees, without crepitus.  There is mild tenderness to palpation at the medial joint line.  Adriana's is negative without crepitus. The knee is ligamentously stable. Patello-femoral grind test is positive.      The calves and thighs are symmetric, without atrophy and non-tender to palpation. Анна's sign is negative, bilaterally.   CMS is intact to the toes.        ASSESSMENT / PLAN: Primary osteoarthritis left knee. I offered him a corticosteroid injection, which he accepted.      Procedure Note:  After risks and benefits were explained and questions answered, pt agreed to undergo a left knee injection. Using aseptic technique and a inferolateral parapatellar tendon approach, the joint space was infiltrated with 4 mg of Dexamethasone, 40 mg of Depo Medrol, and 3 cc of 1% Lidocaine.  There were no complications and the patient tolerated the procedure well.    Imaging Interpretation:         James New MD  Department of Orthopedic Surgery        Disclaimer: This note consists of symbols derived from  keyboarding, dictation and/or voice recognition software. As a result, there may be errors in the script that have gone undetected. Please consider this when interpreting information found in this chart.

## 2017-09-01 NOTE — MR AVS SNAPSHOT
"              After Visit Summary   9/1/2017    Manuel Schofield    MRN: [id number]           Patient Information     Date Of Birth          1955        Visit Information        Provider Department      9/1/2017 11:00 AM Allan New MD [facility]        Today's Diagnoses     Left knee pain, unspecified chronicity    -  1    Primary osteoarthritis of left knee           Follow-ups after your visit        Who to contact     If you have questions or need follow up information about today's clinic visit or your schedule please contact [facility] directly at [phone].  Normal or non-critical lab and imaging results will be communicated to you by Myerhart, letter or phone within 4 business days after the clinic has received the results. If you do not hear from us within 7 days, please contact the clinic through [employer] or phone. If you have a critical or abnormal lab result, we will notify you by phone as soon as possible.  Submit refill requests through [employer] or call your pharmacy and they will forward the refill request to us. Please allow 3 business days for your refill to be completed.          Additional Information About Your Visit        MyChart Information     [employer] gives you secure access to your electronic health record. If you see a primary care provider, you can also send messages to your care team and make appointments. If you have questions, please call your primary care clinic.  If you do not have a primary care provider, please call [phone] and they will assist you.        Care EveryWhere ID     This is your Care EveryWhere ID. This could be used by other organizations to access your [facility] medical records  [id number]        Your Vitals Were     Height BMI (Body Mass Index)                5' 9.75\" (1.772 m) 37 kg/m2           Blood Pressure from Last 3 Encounters:   09/01/17 128/78   08/30/17 112/68   11/15/16 110/72    Weight " from Last 3 Encounters:   09/01/17 256 lb (116.1 kg)   08/30/17 256 lb 1.6 oz (116.2 kg)   11/15/16 254 lb (115.2 kg)              We Performed the Following     DEXAMETHASONE SODIUM PHOS PER 1 MG     DRAIN/INJECT LARGE JOINT/BURSA     METHYLPREDNISOLONE 40 MG INJ          Today's Medication Changes          These changes are accurate as of: 9/1/17 11:59 PM.  If you have any questions, ask your nurse or doctor.               Start taking these medicines.        Dose/Directions    dexamethasone 4 MG/ML injection   Commonly known as:  DECADRON   Used for:  Primary osteoarthritis of left knee   Started by:  Allan New MD        Dose:  4 mg   Inject 1 mL (4 mg) as directed once for 1 dose   Quantity:  1 mL   Refills:  0       lidocaine 1 % injection   Used for:  Primary osteoarthritis of left knee   Started by:  Allan New MD        Dose:  3 mL   3 mLs by INTRA-ARTICULAR route once for 1 dose   Quantity:  3 mL   Refills:  0       methylPREDNISolone acetate 40 MG/ML injection   Commonly known as:  DEPO-MEDROL   Used for:  Primary osteoarthritis of left knee   Started by:  Allan New MD        Dose:  40 mg   1 mL (40 mg) by INTRA-ARTICULAR route once for 1 dose   Quantity:  1 mL   Refills:  0            Where to get your medicines      Some of these will need a paper prescription and others can be bought over the counter.  Ask your nurse if you have questions.     You don't need a prescription for these medications     dexamethasone 4 MG/ML injection    lidocaine 1 % injection    methylPREDNISolone acetate 40 MG/ML injection                Primary Care Provider Office Phone # Fax #    Vinod Chris [phone] [address] [place] [address]        Equal Access to Services     [facility]: Tadeo Cisneros, dixon palumbo, kitty estevez. So [facility] [phone].    ATENCIÓN:  Si habla manuel, tiene a cook disposición servicios gratuitos de asistencia lingüística. Camille soliman [phone].    We comply with applicable federal civil rights laws and [place] laws. We do not discriminate on the basis of race, color, national origin, age, disability sex, sexual orientation or gender identity.            Thank you!     Thank you for choosing [facility]  for your care. Our goal is always to provide you with excellent care. Hearing back from our patients is one way we can continue to improve our services. Please take a few minutes to complete the written survey that you may receive in the mail after your visit with us. Thank you!             Your Updated Medication List - Protect others around you: Learn how to safely use, store and throw away your medicines at www.disposemymeds.org.          This list is accurate as of: 9/1/17 11:59 PM.  Always use your most recent med list.                   Brand Name Dispense Instructions for use Diagnosis    ALLEGRA PO           dexamethasone 4 MG/ML injection    DECADRON    1 mL    Inject 1 mL (4 mg) as directed once for 1 dose    Primary osteoarthritis of left knee       levothyroxine 200 MCG tablet    SYNTHROID/LEVOTHROID    90 tablet    Take 1 tablet (200 mcg) by mouth daily    Postoperative hypothyroidism       lidocaine 1 % injection     3 mL    3 mLs by INTRA-ARTICULAR route once for 1 dose    Primary osteoarthritis of left knee       lisinopril 10 MG tablet    PRINIVIL/ZESTRIL    90 tablet    Take 1 tablet (10 mg) by mouth daily    Essential hypertension       mesalamine 500 MG Cpcr CR capsule    PENTASA    540 capsule    Take 3 capsules (1,500 mg) by mouth 2 times daily    Inflammatory bowel disease       methylPREDNISolone acetate 40 MG/ML injection    DEPO-MEDROL    1 mL    1 mL (40 mg) by INTRA-ARTICULAR route once for 1 dose    Primary osteoarthritis of left knee       omeprazole 20 MG CR capsule    priLOSEC    90 capsule     Take 1 capsule (20 mg) by mouth daily    Gastroesophageal reflux disease without esophagitis

## 2017-09-01 NOTE — NURSING NOTE
"Chief Complaint   Patient presents with     Knee Pain     Left knee       Initial /78 (BP Location: Left arm, Patient Position: Sitting, Cuff Size: Adult Regular)  Ht 5' 9.75\" (1.772 m)  Wt 256 lb (116.1 kg)  BMI 37 kg/m2 Estimated body mass index is 37 kg/(m^2) as calculated from the following:    Height as of this encounter: 5' 9.75\" (1.772 m).    Weight as of this encounter: 256 lb (116.1 kg).  Medication Reconciliation: complete    "

## 2017-09-15 RX ORDER — LIDOCAINE HYDROCHLORIDE 10 MG/ML
3 INJECTION, SOLUTION INFILTRATION; PERINEURAL ONCE
Qty: 3 ML | Refills: 0 | OUTPATIENT
Start: 2017-09-15 | End: 2017-09-15

## 2017-09-15 RX ORDER — DEXAMETHASONE SODIUM PHOSPHATE 4 MG/ML
4 INJECTION, SOLUTION INTRA-ARTICULAR; INTRALESIONAL; INTRAMUSCULAR; INTRAVENOUS; SOFT TISSUE ONCE
Qty: 1 ML | Refills: 0 | OUTPATIENT
Start: 2017-09-15 | End: 2017-09-15

## 2017-09-15 RX ORDER — METHYLPREDNISOLONE ACETATE 40 MG/ML
40 INJECTION, SUSPENSION INTRA-ARTICULAR; INTRALESIONAL; INTRAMUSCULAR; SOFT TISSUE ONCE
Qty: 1 ML | Refills: 0 | OUTPATIENT
Start: 2017-09-15 | End: 2017-09-15

## 2017-10-27 ENCOUNTER — TRANSFERRED RECORDS (OUTPATIENT)
Dept: HEALTH INFORMATION MANAGEMENT | Facility: CLINIC | Age: 62
End: 2017-10-27

## 2018-08-01 ENCOUNTER — OFFICE VISIT (OUTPATIENT)
Dept: PODIATRY | Facility: CLINIC | Age: 63
End: 2018-08-01
Payer: COMMERCIAL

## 2018-08-01 ENCOUNTER — RADIANT APPOINTMENT (OUTPATIENT)
Dept: GENERAL RADIOLOGY | Facility: CLINIC | Age: 63
End: 2018-08-01
Attending: PODIATRIST
Payer: COMMERCIAL

## 2018-08-01 VITALS
HEIGHT: 70 IN | BODY MASS INDEX: 37.69 KG/M2 | DIASTOLIC BLOOD PRESSURE: 68 MMHG | WEIGHT: 263.3 LBS | SYSTOLIC BLOOD PRESSURE: 120 MMHG

## 2018-08-01 DIAGNOSIS — M79.674 PAIN OF TOE OF RIGHT FOOT: ICD-10-CM

## 2018-08-01 DIAGNOSIS — M79.671 RIGHT FOOT PAIN: Primary | ICD-10-CM

## 2018-08-01 PROCEDURE — 73630 X-RAY EXAM OF FOOT: CPT | Mod: RT

## 2018-08-01 PROCEDURE — 99203 OFFICE O/P NEW LOW 30 MIN: CPT | Performed by: PODIATRIST

## 2018-08-01 PROCEDURE — 84550 ASSAY OF BLOOD/URIC ACID: CPT | Performed by: PODIATRIST

## 2018-08-01 PROCEDURE — 36415 COLL VENOUS BLD VENIPUNCTURE: CPT | Performed by: PODIATRIST

## 2018-08-01 NOTE — LETTER
2018         RE: Manuel Schofield  [address]  [facility] [address]        Dear Colleague,    Thank you for referring your patient, Manuel Schofield, to the [facility]. Please see a copy of my visit note below.      ASSESSMENT/PLAN:    Encounter Diagnoses   Name Primary?     Right foot pain Yes     Pain of toe of right foot      I am not sure of the cause of his pain.  Possibly a neuritis at some level. Certainly gout is a consideration.      Uric Acid pending.    We reviewed the XR images together.    I told him that I am not too concerned, as long as this is not a frequent problem and because it is resolving on its own.     I will release uric acid result to him when avaiable.     Body mass index is 38.05 kg/(m^2).    Weight management plan: Patient was referred to their PCP to discuss a diet and exercise plan.      Allan Claire DPM, FACFAS, MS    [place] Department of Podiatry/Foot & Ankle Surgery      ____________________________________________________________________    HPI:         Chief Complaint: right foot pain  Onset of problem: 2 days; he had a episode of pain at night and when he got up in the morning, the noticed some redness and swelling in his lesser toes.  It has largely resolved now.   Pain/ discomfort is described as:  Sharp, shooting  Ratin/10   Frequency:  intermittent    He asked about gout.       *  Patient Active Problem List   Diagnosis     Hereditary progressive muscular dystrophy (H)     Tinnitus     CARDIOVASCULAR SCREENING; LDL GOAL LESS THAN 130     Health Care Home     Advanced directives, counseling/discussion     Family history of prostate cancer     Obesity     Inflammatory bowel disease     Postoperative hypothyroidism     Gastroesophageal reflux disease without esophagitis     Benign essential hypertension   *  *  Past Surgical History:   Procedure Laterality Date     APPENDECTOMY       CHOLECYSTECTOMY       COLONOSCOPY  2013    [facility]oscopy   [facility]     COLONOSCOPY  7/8/2013    Procedure: COLONOSCOPY;  Colonoscopy ;  Surgeon: Vinod Pruitt MD;  Location:  GI     ENT SURGERY      T&A as a child     HC THYROID LOBECTOMY,UNILAT  1990     HC THYROID LOBECTOMY,UNILAT  2008    resection goiter   *  *  Current Outpatient Prescriptions   Medication Sig Dispense Refill     Fexofenadine HCl (ALLEGRA PO)        levothyroxine (SYNTHROID/LEVOTHROID) 200 MCG tablet Take 1 tablet (200 mcg) by mouth daily 90 tablet 3     lisinopril (PRINIVIL/ZESTRIL) 10 MG tablet Take 1 tablet (10 mg) by mouth daily 90 tablet 3     mesalamine (PENTASA) 500 MG CPCR CR capsule Take 3 capsules (1,500 mg) by mouth 2 times daily 540 capsule 3     omeprazole (PRILOSEC) 20 MG CR capsule Take 1 capsule (20 mg) by mouth daily 90 capsule 3       ROS:     A 10-point review of systems was performed and is positive for that noted above in the HPI and as seen below.  All other areas are negative.     Numbness in feet?  no   Calf pain with walking? no  Recent foot/ankle injury? no  Weight change over past 12 months? no  Self perception as overweight? yes  Recent flu-like symptoms? no  Joint pain other than feet ? yes    Social History: Employment:  no;  Exercise/Physical activity:  no;  Tobacco use:  yes  Social History     Social History     Marital status:      Spouse name: N/A     Number of children: N/A     Years of education: N/A     Occupational History     Not on file.     Social History Main Topics     Smoking status: Former Smoker     Packs/day: 1.00     Years: 40.00     Types: Cigarettes     Smokeless tobacco: Never Used     Alcohol use 0.0 oz/week     0 Standard drinks or equivalent per week      Comment: rare     Drug use: No     Sexual activity: Yes     Partners: Female     Other Topics Concern     Not on file     Social History Narrative       Family history:  Family History   Problem Relation Age of Onset     HEART DISEASE Mother      Cancer - colorectal Mother       "Alzheimer Disease Father      Neurologic Disorder Father      GASTROINTESTINAL DISEASE Brother      colon polyps     Diabetes Sister      Diabetes Brother        Rheumatoid arthritis:  no  Foot Problems: yes  Diabetes: yes      EXAM:    Vitals: /68  Ht 5' 9.75\" (1.772 m)  Wt 263 lb 4.8 oz (119.4 kg)  BMI 38.05 kg/m2  BMI: Body mass index is 38.05 kg/(m^2).  Height: 5' 9.75\"    Constitutional/ general:  Pt is in no apparent distress, appears well-nourished.  Cooperative with history and physical exam.     Vascular:  Pedal pulses are palpable bilaterally for both the DP and PT arteries.  CFT < 3 sec.  No edema.  Pedal hair growth noted.     Neuro:  Alert and oriented x 3. Coordinated gait.  Light touch sensation is intact to the L4, L5, S1 distributions. No obvious deficits.  No evidence of neurological-based weakness, spasticity, or contracture in the lower extremities.     Derm: Normal texture and turgor.  No erythema, ecchymosis, or cyanosis.  No open lesions.  Some dry peeling skin on dorsal aspects of right foot toes, 3,4,5.     Musculoskeletal:    Lower extremity muscle strength is normal.  Patient is ambulatory without an assistive device or brace .  No gross deformities.  Pain on palpation: over the distal right 4th metatarsal shaft.       Radiographic Exam:  X-Ray Findings:  I personally reviewed the right foot films.  Negative other than some degenerative changes 1st metatarsophalangeal joint.       Allan Claire DPM, FACFAS, MS    [place] Department of Podiatry/Foot & Ankle Surgery                Again, thank you for allowing me to participate in the care of your patient.        Sincerely,        Allan Claire DPM    "

## 2018-08-01 NOTE — PROGRESS NOTES
ASSESSMENT/PLAN:    Encounter Diagnoses   Name Primary?     Right foot pain Yes     Pain of toe of right foot      I am not sure of the cause of his pain.  Possibly a neuritis at some level. Certainly gout is a consideration.      Uric Acid pending.    We reviewed the XR images together.    I told him that I am not too concerned, as long as this is not a frequent problem and because it is resolving on its own.     I will release uric acid result to him when avaiable.     Body mass index is 38.05 kg/(m^2).    Weight management plan: Patient was referred to their PCP to discuss a diet and exercise plan.      Allan Claire DPM, FACFAS, MS    [place] Department of Podiatry/Foot & Ankle Surgery      ____________________________________________________________________    HPI:         Chief Complaint: right foot pain  Onset of problem: 2 days; he had a episode of pain at night and when he got up in the morning, the noticed some redness and swelling in his lesser toes.  It has largely resolved now.   Pain/ discomfort is described as:  Sharp, shooting  Ratin/10   Frequency:  intermittent    He asked about gout.       *  Patient Active Problem List   Diagnosis     Hereditary progressive muscular dystrophy (H)     Tinnitus     CARDIOVASCULAR SCREENING; LDL GOAL LESS THAN 130     Health Care Home     Advanced directives, counseling/discussion     Family history of prostate cancer     Obesity     Inflammatory bowel disease     Postoperative hypothyroidism     Gastroesophageal reflux disease without esophagitis     Benign essential hypertension   *  *  Past Surgical History:   Procedure Laterality Date     APPENDECTOMY       CHOLECYSTECTOMY       COLONOSCOPY  2013    Colonoscopy Dr. Pruitt [facility]     COLONOSCOPY  2013    Procedure: COLONOSCOPY;  Colonoscopy ;  Surgeon: Vinod Pruitt MD;  Location:  GI     ENT SURGERY      T&A as a child      THYROID LOBECTOMY,UNILAT        THYROID LOBECTOMY,UNILAT   2008    resection goiter   *  *  Current Outpatient Prescriptions   Medication Sig Dispense Refill     Fexofenadine HCl (ALLEGRA PO)        levothyroxine (SYNTHROID/LEVOTHROID) 200 MCG tablet Take 1 tablet (200 mcg) by mouth daily 90 tablet 3     lisinopril (PRINIVIL/ZESTRIL) 10 MG tablet Take 1 tablet (10 mg) by mouth daily 90 tablet 3     mesalamine (PENTASA) 500 MG CPCR CR capsule Take 3 capsules (1,500 mg) by mouth 2 times daily 540 capsule 3     omeprazole (PRILOSEC) 20 MG CR capsule Take 1 capsule (20 mg) by mouth daily 90 capsule 3       ROS:     A 10-point review of systems was performed and is positive for that noted above in the HPI and as seen below.  All other areas are negative.     Numbness in feet?  no   Calf pain with walking? no  Recent foot/ankle injury? no  Weight change over past 12 months? no  Self perception as overweight? yes  Recent flu-like symptoms? no  Joint pain other than feet ? yes    Social History: Employment:  no;  Exercise/Physical activity:  no;  Tobacco use:  yes  Social History     Social History     Marital status:      Spouse name: N/A     Number of children: N/A     Years of education: N/A     Occupational History     Not on file.     Social History Main Topics     Smoking status: Former Smoker     Packs/day: 1.00     Years: 40.00     Types: Cigarettes     Smokeless tobacco: Never Used     Alcohol use 0.0 oz/week     0 Standard drinks or equivalent per week      Comment: rare     Drug use: No     Sexual activity: Yes     Partners: Female     Other Topics Concern     Not on file     Social History Narrative       Family history:  Family History   Problem Relation Age of Onset     HEART DISEASE Mother      Cancer - colorectal Mother      Alzheimer Disease Father      Neurologic Disorder Father      GASTROINTESTINAL DISEASE Brother      colon polyps     Diabetes Sister      Diabetes Brother        Rheumatoid arthritis:  no  Foot Problems: yes  Diabetes: yes      EXAM:   "  Vitals: /68  Ht 5' 9.75\" (1.772 m)  Wt 263 lb 4.8 oz (119.4 kg)  BMI 38.05 kg/m2  BMI: Body mass index is 38.05 kg/(m^2).  Height: 5' 9.75\"    Constitutional/ general:  Pt is in no apparent distress, appears well-nourished.  Cooperative with history and physical exam.     Vascular:  Pedal pulses are palpable bilaterally for both the DP and PT arteries.  CFT < 3 sec.  No edema.  Pedal hair growth noted.     Neuro:  Alert and oriented x 3. Coordinated gait.  Light touch sensation is intact to the L4, L5, S1 distributions. No obvious deficits.  No evidence of neurological-based weakness, spasticity, or contracture in the lower extremities.     Derm: Normal texture and turgor.  No erythema, ecchymosis, or cyanosis.  No open lesions.  Some dry peeling skin on dorsal aspects of right foot toes, 3,4,5.     Musculoskeletal:    Lower extremity muscle strength is normal.  Patient is ambulatory without an assistive device or brace .  No gross deformities.  Pain on palpation: over the distal right 4th metatarsal shaft.       Radiographic Exam:  X-Ray Findings:  I personally reviewed the right foot films.  Negative other than some degenerative changes 1st metatarsophalangeal joint.       Allan Claire DPM, GABRIEL, MS [place] Department of Podiatry/Foot & Ankle Surgery              "

## 2018-08-01 NOTE — MR AVS SNAPSHOT
"              After Visit Summary   8/1/2018    Manuel Schofield    MRN: [id number]           Patient Information     Date Of Birth          1955        Visit Information        Provider Department      8/1/2018 1:00 PM Allan Claire DPM [facility]        Today's Diagnoses     Right foot pain    -  1    Pain of toe of right foot           Follow-ups after your visit        Who to contact     If you have questions or need follow up information about today's clinic visit or your schedule please contact [facility] directly at [phone].  Normal or non-critical lab and imaging results will be communicated to you by [employer], letter or phone within 4 business days after the clinic has received the results. If you do not hear from us within 7 days, please contact the clinic through [employer] or phone. If you have a critical or abnormal lab result, we will notify you by phone as soon as possible.  Submit refill requests through [employer] or call your pharmacy and they will forward the refill request to us. Please allow 3 business days for your refill to be completed.          Additional Information About Your Visit        MyChart Information     [employer] gives you secure access to your electronic health record. If you see a primary care provider, you can also send messages to your care team and make appointments. If you have questions, please call your primary care clinic.  If you do not have a primary care provider, please call [phone] and they will assist you.        Care EveryWhere ID     This is your Care EveryWhere ID. This could be used by other organizations to access your [facility] medical records  [id number]        Your Vitals Were     Height BMI (Body Mass Index)                5' 9.75\" (1.772 m) 38.05 kg/m2           Blood Pressure from Last 3 Encounters:   08/01/18 120/68   09/01/17 128/78   08/30/17 112/68    Weight from Last 3 Encounters:   08/01/18 263 lb 4.8 oz (119.4 kg) "   09/01/17 256 lb (116.1 kg)   08/30/17 256 lb 1.6 oz (116.2 kg)              We Performed the Following     Uric acid     XR Foot Right G/E 3 Views        Primary Care Provider Office Phone # Fax #    Vinod Chris [phone] [address] [place] [address]        Equal Access to Services     [facility]: Hadii aad ku hadasho Soomaali, waaxda luqadaha, qaybta kaalmada adeegyada, waxay idiin hayaan adeeg kharash la'aan ah. So [facility] [phone].    ATENCIÓN: Si habla español, tiene a cook disposición servicios gratuitos de asistencia lingüística. [facility] [phone].    We comply with applicable federal civil rights laws and [place] laws. We do not discriminate on the basis of race, color, national origin, age, disability, sex, sexual orientation, or gender identity.            Thank you!     Thank you for choosing [facility]  for your care. Our goal is always to provide you with excellent care. Hearing back from our patients is one way we can continue to improve our services. Please take a few minutes to complete the written survey that you may receive in the mail after your visit with us. Thank you!             Your Updated Medication List - Protect others around you: Learn how to safely use, store and throw away your medicines at www.disposemymeds.org.          This list is accurate as of 8/1/18  1:52 PM.  Always use your most recent med list.                   Brand Name Dispense Instructions for use Diagnosis    ALLEGRA PO           levothyroxine 200 MCG tablet    SYNTHROID/LEVOTHROID    90 tablet    Take 1 tablet (200 mcg) by mouth daily    Postoperative hypothyroidism       lisinopril 10 MG tablet    PRINIVIL/ZESTRIL    90 tablet    Take 1 tablet (10 mg) by mouth daily    Essential hypertension       mesalamine 500 MG Cpcr CR capsule    PENTASA    540 capsule    Take 3 capsules (1,500 mg) by mouth 2 times daily    Inflammatory bowel disease        omeprazole 20 MG CR capsule    priLOSEC    90 capsule    Take 1 capsule (20 mg) by mouth daily    Gastroesophageal reflux disease without esophagitis

## 2018-08-02 LAB — URATE SERPL-MCNC: 5.9 MG/DL (ref 3.5–7.2)

## 2018-08-03 ENCOUNTER — TRANSFERRED RECORDS (OUTPATIENT)
Dept: HEALTH INFORMATION MANAGEMENT | Facility: CLINIC | Age: 63
End: 2018-08-03

## 2018-08-08 ENCOUNTER — TRANSFERRED RECORDS (OUTPATIENT)
Dept: HEALTH INFORMATION MANAGEMENT | Facility: CLINIC | Age: 63
End: 2018-08-08

## 2018-09-17 ASSESSMENT — ENCOUNTER SYMPTOMS
CHILLS: 0
HEMATOCHEZIA: 0
JOINT SWELLING: 0
ABDOMINAL PAIN: 1
ARTHRALGIAS: 1
HEADACHES: 0
FEVER: 0
CONSTIPATION: 0
PALPITATIONS: 0
EYE PAIN: 0
PARESTHESIAS: 0
SHORTNESS OF BREATH: 1
HEMATURIA: 0
NAUSEA: 0
NERVOUS/ANXIOUS: 0
MYALGIAS: 1
COUGH: 0
DIARRHEA: 0
SORE THROAT: 0
WEAKNESS: 1
HEARTBURN: 1
DYSURIA: 0
FREQUENCY: 0
DIZZINESS: 0

## 2018-09-19 ENCOUNTER — OFFICE VISIT (OUTPATIENT)
Dept: PEDIATRICS | Facility: CLINIC | Age: 63
End: 2018-09-19
Payer: COMMERCIAL

## 2018-09-19 VITALS
HEIGHT: 70 IN | OXYGEN SATURATION: 98 % | BODY MASS INDEX: 37.51 KG/M2 | HEART RATE: 60 BPM | TEMPERATURE: 97.9 F | DIASTOLIC BLOOD PRESSURE: 70 MMHG | WEIGHT: 262 LBS | SYSTOLIC BLOOD PRESSURE: 122 MMHG

## 2018-09-19 DIAGNOSIS — I10 ESSENTIAL HYPERTENSION: ICD-10-CM

## 2018-09-19 DIAGNOSIS — E89.0 POSTOPERATIVE HYPOTHYROIDISM: ICD-10-CM

## 2018-09-19 DIAGNOSIS — Z23 NEED FOR PNEUMOCOCCAL VACCINATION: ICD-10-CM

## 2018-09-19 DIAGNOSIS — Z00.01 ENCOUNTER FOR ROUTINE ADULT HEALTH EXAMINATION WITH ABNORMAL FINDINGS: Primary | ICD-10-CM

## 2018-09-19 DIAGNOSIS — K50.00 CROHN'S DISEASE OF SMALL INTESTINE WITHOUT COMPLICATION (H): ICD-10-CM

## 2018-09-19 DIAGNOSIS — Z72.0 TOBACCO USE: ICD-10-CM

## 2018-09-19 DIAGNOSIS — Z12.5 ENCOUNTER FOR SCREENING FOR MALIGNANT NEOPLASM OF PROSTATE: ICD-10-CM

## 2018-09-19 DIAGNOSIS — Z80.42 FAMILY HISTORY OF PROSTATE CANCER: ICD-10-CM

## 2018-09-19 DIAGNOSIS — G71.09 HEREDITARY PROGRESSIVE MUSCULAR DYSTROPHY (H): ICD-10-CM

## 2018-09-19 DIAGNOSIS — Z23 NEED FOR PROPHYLACTIC VACCINATION AND INOCULATION AGAINST INFLUENZA: ICD-10-CM

## 2018-09-19 PROBLEM — K50.10 CROHN'S DISEASE OF COLON WITHOUT COMPLICATION (H): Status: ACTIVE | Noted: 2018-09-19

## 2018-09-19 LAB — PSA SERPL-ACNC: 0.6 UG/L (ref 0–4)

## 2018-09-19 PROCEDURE — 90686 IIV4 VACC NO PRSV 0.5 ML IM: CPT | Performed by: INTERNAL MEDICINE

## 2018-09-19 PROCEDURE — 90472 IMMUNIZATION ADMIN EACH ADD: CPT | Performed by: INTERNAL MEDICINE

## 2018-09-19 PROCEDURE — 36415 COLL VENOUS BLD VENIPUNCTURE: CPT | Performed by: INTERNAL MEDICINE

## 2018-09-19 PROCEDURE — 80053 COMPREHEN METABOLIC PANEL: CPT | Performed by: INTERNAL MEDICINE

## 2018-09-19 PROCEDURE — 84443 ASSAY THYROID STIM HORMONE: CPT | Performed by: INTERNAL MEDICINE

## 2018-09-19 PROCEDURE — 80061 LIPID PANEL: CPT | Performed by: INTERNAL MEDICINE

## 2018-09-19 PROCEDURE — 90670 PCV13 VACCINE IM: CPT | Performed by: INTERNAL MEDICINE

## 2018-09-19 PROCEDURE — 90471 IMMUNIZATION ADMIN: CPT | Performed by: INTERNAL MEDICINE

## 2018-09-19 PROCEDURE — 99396 PREV VISIT EST AGE 40-64: CPT | Mod: 25 | Performed by: INTERNAL MEDICINE

## 2018-09-19 PROCEDURE — G0103 PSA SCREENING: HCPCS | Performed by: INTERNAL MEDICINE

## 2018-09-19 RX ORDER — MESALAMINE 500 MG/1
1500 CAPSULE, EXTENDED RELEASE ORAL 2 TIMES DAILY
Qty: 540 CAPSULE | Refills: 3 | Status: CANCELLED | OUTPATIENT
Start: 2018-09-19

## 2018-09-19 RX ORDER — BUPROPION HYDROCHLORIDE 150 MG/1
TABLET, EXTENDED RELEASE ORAL
Qty: 60 TABLET | Refills: 2 | Status: SHIPPED | OUTPATIENT
Start: 2018-09-19 | End: 2019-09-19

## 2018-09-19 RX ORDER — LEVOTHYROXINE SODIUM 200 UG/1
200 TABLET ORAL DAILY
Qty: 90 TABLET | Refills: 11 | Status: SHIPPED | OUTPATIENT
Start: 2018-09-19 | End: 2019-10-10

## 2018-09-19 RX ORDER — LISINOPRIL 10 MG/1
10 TABLET ORAL DAILY
Qty: 90 TABLET | Refills: 11 | Status: SHIPPED | OUTPATIENT
Start: 2018-09-19 | End: 2019-10-10

## 2018-09-19 ASSESSMENT — ENCOUNTER SYMPTOMS
HEADACHES: 0
EYE PAIN: 0
NAUSEA: 0
PARESTHESIAS: 0
JOINT SWELLING: 0
FREQUENCY: 0
SORE THROAT: 0
HEMATOCHEZIA: 0
WEAKNESS: 1
CONSTIPATION: 0
NERVOUS/ANXIOUS: 0
DIARRHEA: 0
DIZZINESS: 0
HEARTBURN: 1
MYALGIAS: 1
PALPITATIONS: 0
COUGH: 0
FEVER: 0
HEMATURIA: 0
CHILLS: 0
DYSURIA: 0

## 2018-09-19 NOTE — PROGRESS NOTES
SUBJECTIVE:   CC: Manuel Schofield is an 63 year old male who presents for preventative health visit.     Physical   Annual:     Getting at least 3 servings of Calcium per day:  Yes    Bi-annual eye exam:  NO    Dental care twice a year:  Yes    Sleep apnea or symptoms of sleep apnea:  Daytime drowsiness    Diet:  Regular (no restrictions)    Frequency of exercise:  1 day/week    Duration of exercise:  30-45 minutes    Taking medications regularly:  Yes    Medication side effects:  None    Additional concerns today:  No            Today's PHQ-2 Score:   PHQ-2 ( 1999 Pfizer) 9/17/2018   Q1: Little interest or pleasure in doing things 1   Q2: Feeling down, depressed or hopeless 1   PHQ-2 Score 2   Q1: Little interest or pleasure in doing things Several days   Q2: Feeling down, depressed or hopeless Several days   PHQ-2 Score 2       Abuse: Current or Past(Physical, Sexual or Emotional)- No  Do you feel safe in your environment - Yes    Social History   Substance Use Topics     Smoking status: Former Smoker     Packs/day: 1.00     Years: 40.00     Types: Cigarettes     Smokeless tobacco: Never Used     Alcohol use 0.0 oz/week     0 Standard drinks or equivalent per week      Comment: rare     Alcohol Use 9/17/2018   If you drink alcohol do you typically have greater than 3 drinks per day OR greater than 7 drinks per week? No       Last PSA:   PSA   Date Value Ref Range Status   09/19/2018 0.60 0 - 4 ug/L Final     Comment:     Assay Method:  Chemiluminescence using Siemens Vista analyzer       Reviewed orders with patient. Reviewed health maintenance and updated orders accordingly - Yes      Reviewed and updated as needed this visit by clinical staff  Tobacco  Allergies  Meds         Reviewed and updated as needed this visit by Provider        Patient Active Problem List   Diagnosis     Hereditary progressive muscular dystrophy (H)     Tinnitus     CARDIOVASCULAR SCREENING; LDL GOAL LESS THAN 130     Health Care Home      Advanced directives, counseling/discussion     Family history of prostate cancer     Obesity     Postoperative hypothyroidism     Gastroesophageal reflux disease without esophagitis     Crohn's disease of colon without complication (H)     Past Medical History:   Diagnosis Date     Chest pain, unspecified 10/04    adenosine cardiolyte:ECG negative, no ischemia, EF 59%     Esophageal reflux     EGD 2005 normal     Hereditary progressive muscular dystrophy (H)      Internal hemorrhoids without mention of complication      Unspecified tinnitus     chronic, ENT evaluation       Past Surgical History:   Procedure Laterality Date     APPENDECTOMY       CHOLECYSTECTOMY       COLONOSCOPY  7/8/2013    Colonoscopy Dr. Pruitt [facility]     COLONOSCOPY  7/8/2013    Procedure: COLONOSCOPY;  Colonoscopy ;  Surgeon: Vinod Pruitt MD;  Location:  GI     ENT SURGERY      T&A as a child     HC THYROID LOBECTOMY,UNILAT  1990     HC THYROID LOBECTOMY,UNILAT  2008    resection goiter       Family History   Problem Relation Age of Onset     HEART DISEASE Mother      Cancer - colorectal Mother      Alzheimer Disease Father      Neurologic Disorder Father      GASTROINTESTINAL DISEASE Brother      colon polyps     Diabetes Sister      Diabetes Brother        ALLERGIES     Allergies   Allergen Reactions     Peanut-Derived      Seasonal Allergies          Review of Systems   Constitutional: Negative for chills and fever.   HENT: Negative for congestion, ear pain, hearing loss and sore throat.    Eyes: Negative for pain.   Respiratory: Negative for cough.    Cardiovascular: Negative for palpitations and peripheral edema.   Gastrointestinal: Positive for heartburn. Negative for constipation, diarrhea, hematochezia and nausea.   Genitourinary: Positive for impotence. Negative for discharge, dysuria, frequency, hematuria and urgency.   Musculoskeletal: Positive for myalgias. Negative for joint swelling.   Skin: Negative for rash.  "  Neurological: Positive for weakness. Negative for dizziness, headaches and paresthesias.   Psychiatric/Behavioral: The patient is not nervous/anxious.          OBJECTIVE:   /70 (Cuff Size: Adult Large)  Pulse 60  Temp 97.9  F (36.6  C) (Oral)  Ht 5' 9.75\" (1.772 m)  Wt 262 lb (118.8 kg)  SpO2 98%  BMI 37.86 kg/m2    Physical Exam  GENERAL: alert and no distress  EYES: Eyes grossly normal to inspection, PERRL and conjunctivae and sclerae normal  HENT: ear canals and TM's normal, nose and mouth without ulcers or lesions  NECK: no adenopathy, no asymmetry, masses, or scars and thyroid normal to palpation  RESP: lungs clear to auscultation - no rales, rhonchi or wheezes  CV: regular rate and rhythm, normal S1 S2, no S3 or S4, no murmur, click or rub, no peripheral edema and peripheral pulses strong  ABDOMEN: soft, nontender, no hepatosplenomegaly, no masses and bowel sounds normal  MS: bilateral upper extrem neuromuscular atrophy (hands to shoulders)  SKIN: no suspicious lesions or rashes  NEURO: Normal strength and tone, mentation intact and speech normal  PSYCH: mentation appears normal, affect normal/bright      ASSESSMENT/PLAN:       ICD-10-CM    1. Encounter for routine adult health examination with abnormal findings Z00.01        2. Hereditary progressive muscular dystrophy (H) G71.0 NEUROLOGY ADULT REFERRAL      Mainly limited to upper extremity weakness with some progression over time.  Referred to [facility] to establish care     3. Essential hypertension I10 lisinopril (PRINIVIL/ZESTRIL) 10 MG tablet     Lipid panel reflex to direct LDL Fasting     Comprehensive metabolic panel (BMP + Alb, Alk Phos, ALT, AST, Total. Bili, TP)    Adequate control.  Continue current regimen without changes.      4. Crohn's disease  (H) K50.10 Stable on current regimen, followed by GI       5. Postoperative hypothyroidism E89.0 levothyroxine (SYNTHROID/LEVOTHROID) 200 MCG tablet     TSH with free T4 reflex     6. Tobacco " "use Z72.0 buPROPion (WELLBUTRIN SR) 150 MG 12 hr tablet     Unfortunately resumed smoking-intersted in quitting.  zyban use and side effects reviewed     7. Need for prophylactic vaccination and inoculation against influenza Z23 FLU VACCINE, SPLIT VIRUS, IM (QUADRIVALENT) [26910]- >3 YRS     Vaccine Administration, Initial [07158]     8. Family history of prostate cancer Z80.42 PSA, screen   9. Encounter for screening for malignant neoplasm of prostate  Z12.5        10. Need for pneumococcal vaccination Z23 Pneumococcal vaccine 13 valent PCV13 IM (Prevnar) [04498]     ADMIN: Vaccine, Initial (11720)     ADMIN MEDICARE: Pneumococcal Vaccine ()       COUNSELING:   Reviewed preventive health counseling, as reflected in patient instructions       Regular exercise       Healthy diet/nutrition       Colon cancer screening       Prostate cancer screening    BP Readings from Last 1 Encounters:   09/19/18 122/70     Estimated body mass index is 37.86 kg/(m^2) as calculated from the following:    Height as of this encounter: 5' 9.75\" (1.772 m).    Weight as of this encounter: 262 lb (118.8 kg).      Weight management plan: Discussed healthy diet and exercise guidelines and patient will follow up in 12 months in clinic to re-evaluate.     reports that he has quit smoking. His smoking use included Cigarettes. He has a 40.00 pack-year smoking history. He has never used smokeless tobacco.  Tobacco Cessation Action Plan: Pharmacotherapies : Zyban/Wellbutrin    Counseling Resources:  ATP IV Guidelines  Pooled Cohorts Equation Calculator  FRAX Risk Assessment  ICSI Preventive Guidelines  Dietary Guidelines for Americans, 2010  USDA's MyPlate  ASA Prophylaxis  Lung CA Screening    Vinod Chris MD, MD  [facility]    Injectable Influenza Immunization Documentation    1.  Is the person to be vaccinated sick today?   No    2. Does the person to be vaccinated have an allergy to a component   of the vaccine?   No  Egg " Allergy Algorithm Link    3. Has the person to be vaccinated ever had a serious reaction   to influenza vaccine in the past?   No    4. Has the person to be vaccinated ever had Guillain-Barré syndrome?   No    Form completed by Brand[facility]

## 2018-09-19 NOTE — MR AVS SNAPSHOT
After Visit Summary   9/19/2018    Manuel Schofield    MRN: [id number]           Patient Information     Date Of Birth          1955        Visit Information        Provider Department      9/19/2018 7:40 AM Vinod Chris MD [facility]        Today's Diagnoses     Need for prophylactic vaccination and inoculation against influenza    -  1    Hereditary progressive muscular dystrophy (H)        Postoperative hypothyroidism        Essential hypertension        Crohn's disease of colon without complication (H)        Family history of prostate cancer        Encounter for screening for malignant neoplasm of prostate           Care Instructions      Preventive Health Recommendations  Male Ages 50 - 64    Yearly exam:             See your health care provider every year in order to  o   Review health changes.   o   Discuss preventive care.    o   Review your medicines if your doctor has prescribed any.     Have a cholesterol test every 5 years, or more frequently if you are at risk for high cholesterol/heart disease.     Have a diabetes test (fasting glucose) every three years. If you are at risk for diabetes, you should have this test more often.     Have a colonoscopy at age 50, or have a yearly FIT test (stool test). These exams will check for colon cancer.      Talk with your health care provider about whether or not a prostate cancer screening test (PSA) is right for you.    You should be tested each year for STDs (sexually transmitted diseases), if you re at risk.     Shots: Get a flu shot each year. Get a tetanus shot every 10 years.     Nutrition:    Eat at least 5 servings of fruits and vegetables daily.     Eat whole-grain bread, whole-wheat pasta and brown rice instead of white grains and rice.     Get adequate Calcium and Vitamin D.     Lifestyle    Exercise for at least 150 minutes a week (30 minutes a day, 5 days a week). This will help you control your weight and prevent  disease.     Limit alcohol to one drink per day.     No smoking.     Wear sunscreen to prevent skin cancer.     See your dentist every six months for an exam and cleaning.     See your eye doctor every 1 to 2 years.            Follow-ups after your visit        Additional Services     NEUROLOGY ADULT REFERRAL       Your provider has referred you for the following:   [facility]  [phone]    Please be aware that coverage of these services is subject to the terms and limitations of your health insurance plan.  Call member services at your health plan with any benefit or coverage questions.      Please bring the following with you to your appointment:    (1) Any X-Rays, CTs or MRIs which have been performed.  Contact the facility where they were done to arrange for  prior to your scheduled appointment.    (2) List of current medications  (3) This referral request   (4) Any documents/labs given to you for this referral                  Who to contact     If you have questions or need follow up information about today's clinic visit or your schedule please contact [facility] directly at [phone].  Normal or non-critical lab and imaging results will be communicated to you by [employer], letter or phone within 4 business days after the clinic has received the results. If you do not hear from us within 7 days, please contact the clinic through [employer] or phone. If you have a critical or abnormal lab result, we will notify you by phone as soon as possible.  Submit refill requests through [employer] or call your pharmacy and they will forward the refill request to us. Please allow 3 business days for your refill to be completed.          Additional Information About Your Visit        [employer] Information     [employer] gives you secure access to your electronic health record. If you see a primary care provider, you can also send messages to your care team and make appointments. If you  "have questions, please call your primary care clinic.  If you do not have a primary care provider, please call [phone] and they will assist you.        Care EveryWhere ID     This is your Care EveryWhere ID. This could be used by other organizations to access your [facility] medical records  [id number]        Your Vitals Were     Pulse Temperature Height Pulse Oximetry BMI (Body Mass Index)       60 97.9  F (36.6  C) (Oral) 5' 9.75\" (1.772 m) 98% 37.86 kg/m2        Blood Pressure from Last 3 Encounters:   09/19/18 122/70   08/01/18 120/68   09/01/17 128/78    Weight from Last 3 Encounters:   09/19/18 262 lb (118.8 kg)   08/01/18 263 lb 4.8 oz (119.4 kg)   09/01/17 256 lb (116.1 kg)              We Performed the Following     Comprehensive metabolic panel (BMP + Alb, Alk Phos, ALT, AST, Total. Bili, TP)     FLU VACCINE, SPLIT VIRUS, IM (QUADRIVALENT) [70024]- >3 YRS     Lipid panel reflex to direct LDL Fasting     NEUROLOGY ADULT REFERRAL     PSA, screen     TSH with free T4 reflex     Vaccine Administration, Initial [01629]          Today's Medication Changes          These changes are accurate as of 9/19/18  8:09 AM.  If you have any questions, ask your nurse or doctor.               Stop taking these medicines if you haven't already. Please contact your care team if you have questions.     omeprazole 20 MG CR capsule   Commonly known as:  priLOSEC   Stopped by:  Vinod Chris MD                Where to get your medicines      These medications were sent to [facility]/pharmacy #[id number] [place], [place] - [address]  [address] [address]     Phone:  [phone]     levothyroxine 200 MCG tablet    lisinopril 10 MG tablet                Primary Care Provider Office Phone # Fax #    Vinod Chris [phone] [address] [place] [address]        Equal Access to Services     [facility] AH: Hadii nia wilcox hadasho Soalireza, waaxda luqadajonnathan, qaybta kaalmasunshine " kitty reacotygurvinder garrettEugenieaan ah. [facility] [phone].    ATENCIÓN: Si habla manuel, tiene a cook disposición servicios gratuitos de asistencia lingüística. Camille al [phone].    We comply with applicable federal civil rights laws and [place] laws. We do not discriminate on the basis of race, color, national origin, age, disability, sex, sexual orientation, or gender identity.            Thank you!     Thank you for choosing [facility]  for your care. Our goal is always to provide you with excellent care. Hearing back from our patients is one way we can continue to improve our services. Please take a few minutes to complete the written survey that you may receive in the mail after your visit with us. Thank you!             Your Updated Medication List - Protect others around you: Learn how to safely use, store and throw away your medicines at www.disposemymeds.org.          This list is accurate as of 9/19/18  8:09 AM.  Always use your most recent med list.                   Brand Name Dispense Instructions for use Diagnosis    ALLEGRA PO           levothyroxine 200 MCG tablet    SYNTHROID/LEVOTHROID    90 tablet    Take 1 tablet (200 mcg) by mouth daily    Postoperative hypothyroidism       lisinopril 10 MG tablet    PRINIVIL/ZESTRIL    90 tablet    Take 1 tablet (10 mg) by mouth daily    Essential hypertension       mesalamine 500 MG Cpcr CR capsule    PENTASA    540 capsule    Take 3 capsules (1,500 mg) by mouth 2 times daily    Inflammatory bowel disease       ranitidine 300 MG tablet    ZANTAC    30 tablet    Take 1 tablet (300 mg) by mouth At Bedtime

## 2018-09-22 LAB
ALBUMIN SERPL-MCNC: 3.6 G/DL (ref 3.4–5)
ALP SERPL-CCNC: 59 U/L (ref 40–150)
ALT SERPL W P-5'-P-CCNC: 22 U/L (ref 0–70)
ANION GAP SERPL CALCULATED.3IONS-SCNC: 9 MMOL/L (ref 3–14)
AST SERPL W P-5'-P-CCNC: 15 U/L (ref 0–45)
BILIRUB SERPL-MCNC: 0.5 MG/DL (ref 0.2–1.3)
BUN SERPL-MCNC: 12 MG/DL (ref 7–30)
CALCIUM SERPL-MCNC: 8.3 MG/DL (ref 8.5–10.1)
CHLORIDE SERPL-SCNC: 104 MMOL/L (ref 94–109)
CHOLEST SERPL-MCNC: 165 MG/DL
CO2 SERPL-SCNC: 24 MMOL/L (ref 20–32)
CREAT SERPL-MCNC: 0.81 MG/DL (ref 0.66–1.25)
GFR SERPL CREATININE-BSD FRML MDRD: >90 ML/MIN/1.7M2
GLUCOSE SERPL-MCNC: 102 MG/DL (ref 70–99)
HDLC SERPL-MCNC: 38 MG/DL
LDLC SERPL CALC-MCNC: 103 MG/DL
NONHDLC SERPL-MCNC: 127 MG/DL
POTASSIUM SERPL-SCNC: 4.2 MMOL/L (ref 3.4–5.3)
PROT SERPL-MCNC: 7.5 G/DL (ref 6.8–8.8)
SODIUM SERPL-SCNC: 137 MMOL/L (ref 133–144)
TRIGL SERPL-MCNC: 118 MG/DL
TSH SERPL DL<=0.005 MIU/L-ACNC: 1.28 MU/L (ref 0.4–4)

## 2018-10-08 ENCOUNTER — MYC MEDICAL ADVICE (OUTPATIENT)
Dept: PEDIATRICS | Facility: CLINIC | Age: 63
End: 2018-10-08

## 2018-10-08 DIAGNOSIS — M25.562 LEFT KNEE PAIN, UNSPECIFIED CHRONICITY: Primary | ICD-10-CM

## 2018-10-11 ENCOUNTER — OFFICE VISIT (OUTPATIENT)
Dept: ORTHOPEDICS | Facility: CLINIC | Age: 63
End: 2018-10-11
Payer: COMMERCIAL

## 2018-10-11 VITALS — BODY MASS INDEX: 37.86 KG/M2 | DIASTOLIC BLOOD PRESSURE: 78 MMHG | WEIGHT: 262 LBS | SYSTOLIC BLOOD PRESSURE: 122 MMHG

## 2018-10-11 DIAGNOSIS — M17.12 PRIMARY OSTEOARTHRITIS OF LEFT KNEE: Primary | ICD-10-CM

## 2018-10-11 PROCEDURE — 20610 DRAIN/INJ JOINT/BURSA W/O US: CPT | Mod: LT | Performed by: ORTHOPAEDIC SURGERY

## 2018-10-11 RX ADMIN — METHYLPREDNISOLONE ACETATE 40 MG: 40 INJECTION, SUSPENSION INTRA-ARTICULAR; INTRALESIONAL; INTRAMUSCULAR; SOFT TISSUE at 09:00

## 2018-10-11 RX ADMIN — TESTOSTERONE CYPIONATE 1 ML: 200 INJECTION INTRAMUSCULAR at 09:00

## 2018-10-11 RX ADMIN — LIDOCAINE HYDROCHLORIDE 3 ML: 10 INJECTION, SOLUTION INFILTRATION; PERINEURAL at 09:00

## 2018-10-11 NOTE — LETTER
10/11/2018         RE: Manuel Schofield  [address]  [facility] [address]        Dear Colleague,    Thank you for referring your patient, Manuel Schofield, to the [facility] ORTHOPEDIC SURGERY. Please see a copy of my visit note below.    HISTORY OF PRESENT ILLNESS:    Manuel Schofield is a 63 year old male who is seen in follow up for left knee pain. Patient was last seen on 9/1/17. Patient reports onset of knee pain over the last week or so.   Present symptoms: left anteromedial knee pain. Patient notes dull achy pain, and sharp pains with activities. Patient notes sensation of hyperextending and buckling.  Patient notes mild swelling that comes and goes with activities.  Denies catching, locking,and grinding.    Treatments tried to this point:  Cortisone injection 9/1/17.     PHYSICAL EXAM:  /78 (BP Location: Right arm, Patient Position: Chair, Cuff Size: Adult Large)  Wt 262 lb (118.8 kg)  BMI 37.86 kg/m2  Body mass index is 37.86 kg/(m^2).   GENERAL APPEARANCE: healthy, alert and no distress   SKIN: no suspicious lesions or rashes  NEURO: Normal strength and tone, mentation intact and speech normal  VASCULAR:  good pulses, and cappillary refill   LYMPH: no lymphadenopathy   PSYCH:  mentation appears normal and affect normal/bright    MSK:  The patient ambulates without an antalgic gait.  The patient is able to get on and off the exam table without difficulty.        KNEE: Examination of the left knee reveals the lower extremity to have a Normal anatomic alignment.  There is no erythema, ecchymosis nor edema.  There is moderate effusion present clinically.  There is no significant warmth.  Range of motion is 0 to 120 degrees, without crepitus.  There is mild tenderness to palpation at the medial joint line.  Adriana's is negative without crepitus. The knee is ligamentously stable. Patello-femoral grind test is positive.      The calves and thighs are symmetric, without atrophy and  non-tender to palpation. Анна's sign is negative, bilaterally.   CMS is intact to the toes.       IMAGING INTERPRETATION:       ASSESSMENT / PLAN: Primary osteoarthritis, left knee.  I offered him another corticosteroid injection, which he accepted.    Large Joint Injection/Arthocentesis  Date/Time: 10/11/2018 9:00 AM  Performed by: ALLAN MARTINEZ  Authorized by: ALLAN MARTINEZ     Indications:  Pain and osteoarthritis  Needle Size:  25 G  Guidance: landmark guided    Approach:  Anterolateral  Location:  Knee  Site:  L knee joint  Medications:  40 mg methylPREDNISolone acetate 40 MG/ML; 1 mL dexamethasone 120 MG/30ML; 3 mL lidocaine 1 %  Outcome:  Tolerated well, no immediate complications  Procedure discussed: discussed risks, benefits, and alternatives    Consent Given by:  Patient  Timeout: timeout called immediately prior to procedure    Prep: patient was prepped and draped in usual sterile fashion              James Martinez MD  Dept. Orthopedic Surgery  [facility]         Again, thank you for allowing me to participate in the care of your patient.        Sincerely,        Allan Martinez MD

## 2018-10-11 NOTE — PATIENT INSTRUCTIONS
1. Primary osteoarthritis of left knee      Steroid injection of the left knee was performed today in clinic    - Would not soak in a hot tub, bath or swimming pool for 48 hours  - Ok to shower  - The lidocaine (what is giving you pain relief right now) will likely stop working in 1-2 hours.  You will then have pain again, similar to before you received the injection. The corticosteroid will not start working until approximately 1-2 weeks from now.  - Ice today and only do your normal amounts of activity    Follow up as needed.

## 2018-10-11 NOTE — MR AVS SNAPSHOT
After Visit Summary   10/11/2018    Manuel Schofield    MRN: [id number]           Patient Information     Date Of Birth          1955        Visit Information        Provider Department      10/11/2018 8:50 AM Allan New MD [facility]        Today's Diagnoses     Primary osteoarthritis of left knee    -  1      Care Instructions    1. Primary osteoarthritis of left knee      Steroid injection of the left knee was performed today in clinic    - Would not soak in a hot tub, bath or swimming pool for 48 hours  - Ok to shower  - The lidocaine (what is giving you pain relief right now) will likely stop working in 1-2 hours.  You will then have pain again, similar to before you received the injection. The corticosteroid will not start working until approximately 1-2 weeks from now.  - Ice today and only do your normal amounts of activity    Follow up as needed.           Follow-ups after your visit        Who to contact     If you have questions or need follow up information about today's clinic visit or your schedule please contact [facility] directly at [phone].  Normal or non-critical lab and imaging results will be communicated to you by [facility], letter or phone within 4 business days after the clinic has received the results. If you do not hear from us within 7 days, please contact the clinic through [employer] or phone. If you have a critical or abnormal lab result, we will notify you by phone as soon as possible.  Submit refill requests through [email or web address] or call your pharmacy and they will forward the refill request to us. Please allow 3 business days for your refill to be completed.          Additional Information About Your Visit        [facility] [employer] Information     [email or web address] gives you secure access to your electronic health record. If you see a primary care provider, you can also send messages to your care team and make appointments. If you  have questions, please call your primary care clinic.  If you do not have a primary care provider, please call [phone] and they will assist you.        Care EveryWhere ID     This is your Care EveryWhere ID. This could be used by other organizations to access your [facility] medical records  [id number]        Your Vitals Were     BMI (Body Mass Index)                   37.86 kg/m2            Blood Pressure from Last 3 Encounters:   10/11/18 122/78   09/19/18 122/70   08/01/18 120/68    Weight from Last 3 Encounters:   10/11/18 262 lb (118.8 kg)   09/19/18 262 lb (118.8 kg)   08/01/18 263 lb 4.8 oz (119.4 kg)              We Performed the Following     Large Joint Injection/Arthocentesis        Primary Care Provider Office Phone # Fax #    Vinod Chris [phone] [address] [place] [address]        Equal Access to Services     DEEDEE RIVERA : Hadii aad ku hadasho Soomaali, waaxda luqadaha, qaybta kaalmada adeegyada, waxay joselinin haycolemann darin alejandra . So [facility] [phone].    ATENCIÓN: Si libertyla espsabrina, tiene a cook disposición servicios gratuitos de asistencia lingüística. Llame al [phone].    We comply with applicable federal civil rights laws and [place] laws. We do not discriminate on the basis of race, color, national origin, age, disability, sex, sexual orientation, or gender identity.            Thank you!     Thank you for choosing [facility]  for your care. Our goal is always to provide you with excellent care. Hearing back from our patients is one way we can continue to improve our services. Please take a few minutes to complete the written survey that you may receive in the mail after your visit with us. Thank you!             Your Updated Medication List - Protect others around you: Learn how to safely use, store and throw away your medicines at www.disposemymeds.org.          This list is accurate as of 10/11/18 11:59  PM.  Always use your most recent med list.                   Brand Name Dispense Instructions for use Diagnosis    ALLEGRA PO           buPROPion 150 MG 12 hr tablet    WELLBUTRIN SR    60 tablet    Take 1 tablet once daily and increase to 1 tablet twice daily after 4 to 7 days    Tobacco use       levothyroxine 200 MCG tablet    SYNTHROID/LEVOTHROID    90 tablet    Take 1 tablet (200 mcg) by mouth daily    Postoperative hypothyroidism       lisinopril 10 MG tablet    PRINIVIL/ZESTRIL    90 tablet    Take 1 tablet (10 mg) by mouth daily    Essential hypertension       mesalamine 500 MG Cpcr CR capsule    PENTASA    540 capsule    Take 3 capsules (1,500 mg) by mouth 2 times daily    Inflammatory bowel disease       ranitidine 300 MG tablet    ZANTAC    30 tablet    Take 1 tablet (300 mg) by mouth At Bedtime

## 2018-10-11 NOTE — PROGRESS NOTES
HISTORY OF PRESENT ILLNESS:    Manuel Schofield is a 63 year old male who is seen in follow up for left knee pain. Patient was last seen on 9/1/17. Patient reports onset of knee pain over the last week or so.   Present symptoms: left anteromedial knee pain. Patient notes dull achy pain, and sharp pains with activities. Patient notes sensation of hyperextending and buckling.  Patient notes mild swelling that comes and goes with activities.  Denies catching, locking,and grinding.    Treatments tried to this point:  Cortisone injection 9/1/17.     PHYSICAL EXAM:  /78 (BP Location: Right arm, Patient Position: Chair, Cuff Size: Adult Large)  Wt 262 lb (118.8 kg)  BMI 37.86 kg/m2  Body mass index is 37.86 kg/(m^2).   GENERAL APPEARANCE: healthy, alert and no distress   SKIN: no suspicious lesions or rashes  NEURO: Normal strength and tone, mentation intact and speech normal  VASCULAR:  good pulses, and cappillary refill   LYMPH: no lymphadenopathy   PSYCH:  mentation appears normal and affect normal/bright    MSK:  The patient ambulates without an antalgic gait.  The patient is able to get on and off the exam table without difficulty.        KNEE: Examination of the left knee reveals the lower extremity to have a Normal anatomic alignment.  There is no erythema, ecchymosis nor edema.  There is moderate effusion present clinically.  There is no significant warmth.  Range of motion is 0 to 120 degrees, without crepitus.  There is mild tenderness to palpation at the medial joint line.  Adriana's is negative without crepitus. The knee is ligamentously stable. Patello-femoral grind test is positive.      The calves and thighs are symmetric, without atrophy and non-tender to palpation. Анна's sign is negative, bilaterally.   CMS is intact to the toes.       IMAGING INTERPRETATION:       ASSESSMENT / PLAN: Primary osteoarthritis, left knee.  I offered him another corticosteroid injection, which he accepted.    Large  Joint Injection/Arthocentesis  Date/Time: 10/11/2018 9:00 AM  Performed by: ROBERT MARTINEZ  Authorized by: ROBERT MARTINEZ     Indications:  Pain and osteoarthritis  Needle Size:  25 G  Guidance: landmark guided    Approach:  Anterolateral  Location:  Knee  Site:  L knee joint  Medications:  40 mg methylPREDNISolone acetate 40 MG/ML; 1 mL dexamethasone 120 MG/30ML; 3 mL lidocaine 1 %  Outcome:  Tolerated well, no immediate complications  Procedure discussed: discussed risks, benefits, and alternatives    Consent Given by:  Patient  Timeout: timeout called immediately prior to procedure    Prep: patient was prepped and draped in usual sterile fashion              James Martinez MD  Dept. Orthopedic Surgery  [facility]

## 2018-10-12 RX ORDER — METHYLPREDNISOLONE ACETATE 40 MG/ML
40 INJECTION, SUSPENSION INTRA-ARTICULAR; INTRALESIONAL; INTRAMUSCULAR; SOFT TISSUE
Status: DISCONTINUED | OUTPATIENT
Start: 2018-10-11 | End: 2019-09-19

## 2018-10-12 RX ORDER — TESTOSTERONE CYPIONATE 200 MG/ML
1 INJECTION INTRAMUSCULAR
Status: DISCONTINUED | OUTPATIENT
Start: 2018-10-11 | End: 2019-09-19

## 2018-10-12 RX ORDER — LIDOCAINE HYDROCHLORIDE 10 MG/ML
3 INJECTION, SOLUTION INFILTRATION; PERINEURAL
Status: DISCONTINUED | OUTPATIENT
Start: 2018-10-11 | End: 2019-09-19

## 2018-12-08 ENCOUNTER — MYC MEDICAL ADVICE (OUTPATIENT)
Dept: PEDIATRICS | Facility: CLINIC | Age: 63
End: 2018-12-08

## 2018-12-08 DIAGNOSIS — K21.9 GASTROESOPHAGEAL REFLUX DISEASE WITHOUT ESOPHAGITIS: ICD-10-CM

## 2018-12-10 NOTE — TELEPHONE ENCOUNTER
Omeprazole 20 mg  Last Written Prescription Date:  08/30/17  Last Fill Quantity: 90,  # refills: 3   Last office visit: 9/19/2018 with prescribing provider: 09/19/18    Future Office Visit:    Routing refill request to provider for review/approval because:  Drug not active on patient's medication list

## 2019-03-12 ENCOUNTER — MYC MEDICAL ADVICE (OUTPATIENT)
Dept: PEDIATRICS | Facility: CLINIC | Age: 64
End: 2019-03-12

## 2019-03-12 NOTE — TELEPHONE ENCOUNTER
Forwarded to provider for review.  Last office appointment was 09/19/18.  CMP was checked at that visit.  LANETTE Olguin RN

## 2019-09-06 ENCOUNTER — RECORDS - HEALTHEAST (OUTPATIENT)
Dept: ADMINISTRATIVE | Facility: OTHER | Age: 64
End: 2019-09-06

## 2019-09-17 NOTE — PROGRESS NOTES
SUBJECTIVE:   Manuel Schofield is a 64 year old male who presents for Preventive Visit.  Are you in the first 12 months of your Medicare coverage?  No    History of Present Illness        He eats 2-3 servings of fruits and vegetables daily.He consumes 0 sweetened beverage(s) daily.  He is taking medications regularly.    Bloating and constipation for the last few weeks with some diffuse intermittent cramping abdominal pain that is sometimes intense. Took dulcolax a few times with success. Started eating prunes and drinking tomato juice. Hard stools usually but sometimes has looser stools.     Chron's followed by Dr. Mtz of GI. Things seem to be going well.  Currently taking mesalamine.    Has been taking Synthroid 200 mcg for his hypothyroidism.  This has been going well.    Do you feel safe in your environment? Yes    Do you have a Health Care Directive? No: Advance care planning was reviewed with patient; patient declined at this time.      Fall risk  Fallen 2 or more times in the past year?: No  Any fall with injury in the past year?: No    Cognitive Screening   1) Repeat 3 items (Leader, Season, Table)    2) Clock draw: unable to hold a pen  3) 3 item recall: Recalls 3 objects  Results: 3 items recalled: COGNITIVE IMPAIRMENT LESS LIKELY    Mini-CogTM Copyright S Amina. Licensed by the author for use in [facility]; reprinted with permission (yvette@.[email or web address]). All rights reserved.      Do you have sleep apnea, excessive snoring or daytime drowsiness?: yes    Reviewed and updated as needed this visit by clinical staff  Tobacco  Allergies  Meds  Problems  Med Hx  Surg Hx  Fam Hx  Soc Hx          Reviewed and updated as needed this visit by Provider  Tobacco  Allergies  Meds  Problems  Med Hx  Surg Hx  Fam Hx        Social History     Tobacco Use     Smoking status: Current Every Day Smoker     Packs/day: 1.00     Years: 40.00     Pack years: 40.00     Types: Cigarettes     Smokeless  "tobacco: Never Used   Substance Use Topics     Alcohol use: Yes     Alcohol/week: 0.0 oz     Frequency: Monthly or less     Drinks per session: 1 or 2     Binge frequency: Never     Comment: rare     If you drink alcohol do you typically have >3 drinks per day or >7 drinks per week? No    No flowsheet data found.    -------------------------------------    Current providers sharing in care for this patient include:   Patient Care Team:  Vinod Chris MD as PCP - General  Vinod Chris MD as Assigned PCP    The following health maintenance items are reviewed in Epic and correct as of today:  Health Maintenance   Topic Date Due     ANNUAL REVIEW OF HM ORDERS  1955     HIV SCREENING  04/01/1970     ZOSTER IMMUNIZATION (2 of 3) 12/24/2015     MEDICARE ANNUAL WELLNESS VISIT  09/19/2019     TSH W/FREE T4 REFLEX  09/19/2019     ADVANCE CARE PLANNING  10/25/2021     COLONOSCOPY  08/03/2023     LIPID  09/19/2023     DTAP/TDAP/TD IMMUNIZATION (3 - Td) 06/13/2027     HEPATITIS C SCREENING  Completed     PHQ-2  Completed     IPV IMMUNIZATION  Aged Out     MENINGITIS IMMUNIZATION  Aged Out       Review of Systems  Constitutional, HEENT, cardiovascular, pulmonary, GI, , musculoskeletal, neuro, skin, endocrine and psych systems are negative, except as otherwise noted.    OBJECTIVE:   /70 (BP Location: Right arm, Patient Position: Chair, Cuff Size: Adult Regular)   Pulse 59   Temp 97.9  F (36.6  C) (Oral)   Ht 1.759 m (5' 9.25\")   Wt 117.9 kg (259 lb 14.4 oz)   SpO2 96%   BMI 38.10 kg/m   Estimated body mass index is 38.1 kg/m  as calculated from the following:    Height as of this encounter: 1.759 m (5' 9.25\").    Weight as of this encounter: 117.9 kg (259 lb 14.4 oz).  Physical Exam  GENERAL: healthy, alert and no distress  EYES: Eyes grossly normal to inspection, PERRL and conjunctivae and sclerae normal  HENT: ear canals and TM's normal, nose and mouth without ulcers or lesions  NECK: no " adenopathy, no asymmetry, masses, or scars and thyroid normal to palpation  RESP: lungs clear to auscultation - no rales, rhonchi or wheezes  CV: regular rate and rhythm, normal S1 S2, no S3 or S4, no murmur, click or rub, no peripheral edema and peripheral pulses strong  ABDOMEN: soft, nontender, no hepatosplenomegaly, no masses and bowel sounds normal, ventral hernia slightly to left of midline about 6 cm by 3 cm that is soft and reducible  MS: no gross musculoskeletal defects noted, no edema, muscle wasting notable in hands and forearms bilaterally  SKIN: no suspicious lesions or rashes  NEURO: Normal strength and tone in lower extremities, weakness in hands and forearms bilaterally which is unchanged per patient, mentation intact and speech normal  PSYCH: mentation appears normal, affect normal/bright  LYMPH: no cervical, supraclavicular, axillary, or inguinal adenopathy    Diagnostic Test Results:  Pending as below    ASSESSMENT / PLAN:   1. Encounter for Medicare annual wellness exam  Patient overall seems to be doing well.  His vital signs are within normal limits.  He is up-to-date on colonoscopy which was done in 2018 and recommended repeat in 5 years.  He would like to get his flu shot today as below.  Referred him to the pharmacy to get his second shingles vaccine.  Other screening laboratory work as below    2. Other specified hypothyroidism  - TSH with free T4 reflex  -Continue Synthroid 200 mcg daily until lab work returns and may continue similar dose if stable    3. Crohn's disease of small intestine without complication (H)  Complete upcoming follow-up with GI.  Continue mesalamine    4. Encounter for lipid screening for cardiovascular disease  - Lipid panel reflex to direct LDL Fasting    5. Benign essential hypertension  Blood pressure currently well controlled on lisinopril 10 mg  - Comprehensive metabolic panel (BMP + Alb, Alk Phos, ALT, AST, Total. Bili, TP)    6. Screening for diabetes  "mellitus  Fasting blood glucose last year 102.  Discussed healthy eating.  - Comprehensive metabolic panel (BMP + Alb, Alk Phos, ALT, AST, Total. Bili, TP)    7. Screening for prostate cancer  Patient does have a family history of prostate cancer.  However, he has no urinary symptoms currently.  - PSA, screen    8. Need for immunization against influenza  - FLU VACCINE, 3 YRS +, IM (FLUZONE)  - ADMIN INFLUENZA VIRUS VAC    9. Constipation   Discussed increase fluid intake as well as fiber in diet.  Recommended trial of MiraLAX for stool softening.  Patient will follow-up if not improved.    End of Life Planning:  Patient currently has an advanced directive: Yes.  Practitioner is supportive of decision.    COUNSELING:  Reviewed preventive health counseling, as reflected in patient instructions    Estimated body mass index is 38.1 kg/m  as calculated from the following:    Height as of this encounter: 1.759 m (5' 9.25\").    Weight as of this encounter: 117.9 kg (259 lb 14.4 oz).    Weight management plan: Discussed healthy diet and exercise guidelines     reports that he has been smoking cigarettes.  He has a 40.00 pack-year smoking history. He has never used smokeless tobacco.  Tobacco Cessation Action Plan: Information offered: Patient not interested at this time    Appropriate preventive services were discussed with this patient, including applicable screening as appropriate for cardiovascular disease, diabetes, osteopenia/osteoporosis, and glaucoma.  As appropriate for age/gender, discussed screening for colorectal cancer, prostate cancer, breast cancer, and cervical cancer. Checklist reviewing preventive services available has been given to the patient.    Counseling Resources:  ATP IV Guidelines  Pooled Cohorts Equation Calculator  Breast Cancer Risk Calculator  FRAX Risk Assessment  ICSI Preventive Guidelines  Dietary Guidelines for Americans, 2010  USDA's MyPlate  ASA Prophylaxis  Lung CA Screening    Smitha " MD Charis  Internal Medicine & Pediatrics PGY3  [facility]    Attestation:    I have seen patient and reviewed the documentation from Dr. Vizcaino and discussed the findings with Dr. Vizcaino. I agree with the documentation of Dr. Vizcaino.    Mitali Boss MD  Internal Medicine/Pediatrics  [facility]

## 2019-09-19 ENCOUNTER — OFFICE VISIT (OUTPATIENT)
Dept: PEDIATRICS | Facility: CLINIC | Age: 64
End: 2019-09-19
Payer: COMMERCIAL

## 2019-09-19 VITALS
HEART RATE: 59 BPM | SYSTOLIC BLOOD PRESSURE: 118 MMHG | DIASTOLIC BLOOD PRESSURE: 70 MMHG | OXYGEN SATURATION: 96 % | TEMPERATURE: 97.9 F | WEIGHT: 259.9 LBS | BODY MASS INDEX: 38.49 KG/M2 | HEIGHT: 69 IN

## 2019-09-19 DIAGNOSIS — K50.00 CROHN'S DISEASE OF SMALL INTESTINE WITHOUT COMPLICATION (H): ICD-10-CM

## 2019-09-19 DIAGNOSIS — Z13.220 ENCOUNTER FOR LIPID SCREENING FOR CARDIOVASCULAR DISEASE: ICD-10-CM

## 2019-09-19 DIAGNOSIS — E03.8 OTHER SPECIFIED HYPOTHYROIDISM: ICD-10-CM

## 2019-09-19 DIAGNOSIS — Z13.6 ENCOUNTER FOR LIPID SCREENING FOR CARDIOVASCULAR DISEASE: ICD-10-CM

## 2019-09-19 DIAGNOSIS — Z12.5 SCREENING FOR PROSTATE CANCER: ICD-10-CM

## 2019-09-19 DIAGNOSIS — Z13.1 SCREENING FOR DIABETES MELLITUS: ICD-10-CM

## 2019-09-19 DIAGNOSIS — I10 BENIGN ESSENTIAL HYPERTENSION: ICD-10-CM

## 2019-09-19 DIAGNOSIS — Z23 NEED FOR IMMUNIZATION AGAINST INFLUENZA: ICD-10-CM

## 2019-09-19 DIAGNOSIS — K59.00 CONSTIPATION, UNSPECIFIED CONSTIPATION TYPE: ICD-10-CM

## 2019-09-19 DIAGNOSIS — Z00.00 ENCOUNTER FOR MEDICARE ANNUAL WELLNESS EXAM: Primary | ICD-10-CM

## 2019-09-19 LAB
ALBUMIN SERPL-MCNC: 3.6 G/DL (ref 3.4–5)
ALP SERPL-CCNC: 53 U/L (ref 40–150)
ALT SERPL W P-5'-P-CCNC: 30 U/L (ref 0–70)
ANION GAP SERPL CALCULATED.3IONS-SCNC: 6 MMOL/L (ref 3–14)
AST SERPL W P-5'-P-CCNC: 23 U/L (ref 0–45)
BILIRUB SERPL-MCNC: 0.5 MG/DL (ref 0.2–1.3)
BUN SERPL-MCNC: 10 MG/DL (ref 7–30)
CALCIUM SERPL-MCNC: 8.5 MG/DL (ref 8.5–10.1)
CHLORIDE SERPL-SCNC: 108 MMOL/L (ref 94–109)
CHOLEST SERPL-MCNC: 179 MG/DL
CO2 SERPL-SCNC: 24 MMOL/L (ref 20–32)
CREAT SERPL-MCNC: 0.85 MG/DL (ref 0.66–1.25)
GFR SERPL CREATININE-BSD FRML MDRD: >90 ML/MIN/{1.73_M2}
GLUCOSE SERPL-MCNC: 90 MG/DL (ref 70–99)
HDLC SERPL-MCNC: 32 MG/DL
LDLC SERPL CALC-MCNC: 106 MG/DL
NONHDLC SERPL-MCNC: 147 MG/DL
POTASSIUM SERPL-SCNC: 3.9 MMOL/L (ref 3.4–5.3)
PROT SERPL-MCNC: 7.2 G/DL (ref 6.8–8.8)
PSA SERPL-ACNC: 0.68 UG/L (ref 0–4)
SODIUM SERPL-SCNC: 138 MMOL/L (ref 133–144)
TRIGL SERPL-MCNC: 204 MG/DL
TSH SERPL DL<=0.005 MIU/L-ACNC: 0.42 MU/L (ref 0.4–4)

## 2019-09-19 PROCEDURE — 36415 COLL VENOUS BLD VENIPUNCTURE: CPT | Performed by: PEDIATRICS

## 2019-09-19 PROCEDURE — 90686 IIV4 VACC NO PRSV 0.5 ML IM: CPT | Performed by: STUDENT IN AN ORGANIZED HEALTH CARE EDUCATION/TRAINING PROGRAM

## 2019-09-19 PROCEDURE — G0103 PSA SCREENING: HCPCS | Performed by: PEDIATRICS

## 2019-09-19 PROCEDURE — 84443 ASSAY THYROID STIM HORMONE: CPT | Performed by: PEDIATRICS

## 2019-09-19 PROCEDURE — 80061 LIPID PANEL: CPT | Performed by: PEDIATRICS

## 2019-09-19 PROCEDURE — G0008 ADMIN INFLUENZA VIRUS VAC: HCPCS | Performed by: STUDENT IN AN ORGANIZED HEALTH CARE EDUCATION/TRAINING PROGRAM

## 2019-09-19 PROCEDURE — G0438 PPPS, INITIAL VISIT: HCPCS | Mod: GC | Performed by: STUDENT IN AN ORGANIZED HEALTH CARE EDUCATION/TRAINING PROGRAM

## 2019-09-19 PROCEDURE — 80053 COMPREHEN METABOLIC PANEL: CPT | Performed by: PEDIATRICS

## 2019-09-19 SDOH — HEALTH STABILITY: MENTAL HEALTH: HOW MANY STANDARD DRINKS CONTAINING ALCOHOL DO YOU HAVE ON A TYPICAL DAY?: 1 OR 2

## 2019-09-19 SDOH — HEALTH STABILITY: MENTAL HEALTH: HOW OFTEN DO YOU HAVE A DRINK CONTAINING ALCOHOL?: MONTHLY OR LESS

## 2019-09-19 SDOH — HEALTH STABILITY: MENTAL HEALTH: HOW OFTEN DO YOU HAVE 6 OR MORE DRINKS ON ONE OCCASION?: NEVER

## 2019-09-19 ASSESSMENT — MIFFLIN-ST. JEOR: SCORE: 1963.24

## 2019-09-19 NOTE — PROGRESS NOTES
"SUBJECTIVE:   CC: Manuel Schofield is an 64 year old male who presents for preventative health visit.     History of Present Illness        He eats 2-3 servings of fruits and vegetables daily.He consumes 0 sweetened beverage(s) daily.  He is taking medications regularly.       Ability to successfully perform activities of daily living: {YES/NO (MEDICARE):160052::\"Yes, no assistance needed\"}  Home safety:  {IPPE SAFETY CONCERNS:811016::\"none identified\"}   Hearing impairment: Yes, {HEARING (ANNUAL WELLNESS VISIT):532326}    {Outside tests to abstract? :510827}    {additional problems to add (Optional):180776}    Today's PHQ-2 Score:   PHQ-2 ( 1999 Pfizer) 9/16/2019   Q1: Little interest or pleasure in doing things 0   Q2: Feeling down, depressed or hopeless 0   PHQ-2 Score 0   Q1: Little interest or pleasure in doing things Not at all   Q2: Feeling down, depressed or hopeless Not at all   PHQ-2 Score 0       Abuse: Current or Past(Physical, Sexual or Emotional)- { :348534}  Do you feel safe in your environment? { :926436}    Social History     Tobacco Use     Smoking status: Former Smoker     Packs/day: 1.00     Years: 40.00     Pack years: 40.00     Types: Cigarettes     Smokeless tobacco: Never Used   Substance Use Topics     Alcohol use: Yes     Alcohol/week: 0.0 oz     Comment: rare     {Rooming Staff- Complete this question if Prescreen response is not shown below for today's visit. If you drink alcohol do you typically have >3 drinks per day or >7 drinks per week? (Optional):956774}    Alcohol Use 9/17/2018   Prescreen: >3 drinks/day or >7 drinks/week? No   Prescreen: >3 drinks/day or >7 drinks/week? -   {add AUDIT responses (Optional) (A score of 7 for adult men is an indication of hazardous drinking; a score of 8 or more is an indication of an alcohol use disorder.  A score of 7 or more for adult women is an indication of hazardous drinking or an alchohol use disorder):415391}    Last PSA:   PSA   Date Value " "Ref Range Status   09/19/2018 0.60 0 - 4 ug/L Final     Comment:     Assay Method:  Chemiluminescence using Siemens Vista analyzer       Reviewed orders with patient. Reviewed health maintenance and updated orders accordingly - { :847592::\"Yes\"}  {Chronicprobdata (optional):815373}    Reviewed and updated as needed this visit by clinical staff         Reviewed and updated as needed this visit by Provider        {HISTORY OPTIONS (Optional):319515}    Review of Systems  {MALE ROS (Optional):540896::\"CONSTITUTIONAL: NEGATIVE for fever, chills, change in weight\",\"INTEGUMENTARY/SKIN: NEGATIVE for worrisome rashes, moles or lesions\",\"EYES: NEGATIVE for vision changes or irritation\",\"ENT: NEGATIVE for ear, mouth and throat problems\",\"RESP: NEGATIVE for significant cough or SOB\",\"CV: NEGATIVE for chest pain, palpitations or peripheral edema\",\"GI: NEGATIVE for nausea, abdominal pain, heartburn, or change in bowel habits\",\" male: negative for dysuria, hematuria, decreased urinary stream, erectile dysfunction, urethral discharge\",\"MUSCULOSKELETAL: NEGATIVE for significant arthralgias or myalgia\",\"NEURO: NEGATIVE for weakness, dizziness or paresthesias\",\"PSYCHIATRIC: NEGATIVE for changes in mood or affect\"}    OBJECTIVE:   There were no vitals taken for this visit.    Physical Exam  {Exam Choices (Optional):555305}    {Diagnostic Test Results (Optional):757295::\"Diagnostic Test Results:\",\"Labs reviewed in Epic\"}    ASSESSMENT/PLAN:   {Diag Picklist:314268}    COUNSELING:   {MALE COUNSELING MESSAGES:763457::\"Reviewed preventive health counseling, as reflected in patient instructions\"}    Estimated body mass index is 37.86 kg/m  as calculated from the following:    Height as of 9/19/18: 1.772 m (5' 9.75\").    Weight as of 10/11/18: 118.8 kg (262 lb).     {Weight Management Plan (ACO) Complete if BMI is abnormal-  Ages 18-64  BMI >24.9.  Age 65+ with BMI <23 or >30 (Optional):017271}     reports that he has quit smoking. His " smoking use included cigarettes. He has a 40.00 pack-year smoking history. He has never used smokeless tobacco.  {Tobacco Cessation -- Complete if patient is a smoker (Optional):532446}    Counseling Resources:  ATP IV Guidelines  Pooled Cohorts Equation Calculator  FRAX Risk Assessment  ICSI Preventive Guidelines  Dietary Guidelines for Americans, 2010  USDA's MyPlate  ASA Prophylaxis  Lung CA Screening    Smitha Vizcaino MD  [facility]

## 2019-09-19 NOTE — PATIENT INSTRUCTIONS
Go to pharmacy to get your shingles vaccine and flu shot  We will call you with your lab results  For constipation, increase water intake to 6-8 glasses per day, try adding more fiber to your diet and over the counter miralax is a gentle stool softener.      Patient Education   Personalized Prevention Plan  You are due for the preventive services outlined below.  Your care team is available to assist you in scheduling these services.  If you have already completed any of these items, please share that information with your care team to update in your medical record.  Health Maintenance Due   Topic Date Due     ANNUAL REVIEW OF HM ORDERS  1955     HIV Screening  04/01/1970     Zoster (Shingles) Vaccine (2 of 3) 12/24/2015     Annual Wellness Visit  09/19/2019     Thyroid Function Lab  09/19/2019

## 2019-10-10 DIAGNOSIS — I10 ESSENTIAL HYPERTENSION: ICD-10-CM

## 2019-10-10 DIAGNOSIS — E89.0 POSTOPERATIVE HYPOTHYROIDISM: ICD-10-CM

## 2019-10-10 RX ORDER — LEVOTHYROXINE SODIUM 200 UG/1
200 TABLET ORAL DAILY
Qty: 90 TABLET | Refills: 3 | Status: SHIPPED | OUTPATIENT
Start: 2019-10-10 | End: 2020-09-22

## 2019-10-10 RX ORDER — LISINOPRIL 10 MG/1
TABLET ORAL
Qty: 90 TABLET | Refills: 3 | Status: SHIPPED | OUTPATIENT
Start: 2019-10-10 | End: 2020-09-22

## 2019-10-10 NOTE — TELEPHONE ENCOUNTER
"Requested Prescriptions   Pending Prescriptions Disp Refills     lisinopril (PRINIVIL/ZESTRIL) 10 MG tablet [Pharmacy Med Name: LISINOPRIL 10 MG TABLET]    Last Written Prescription Date:  9/19/2018  Last Fill Quantity: 90,  # refills: 11  Last office visit: 9/19/2019 with prescribing provider:  Vinod Chris Office Visit:     90 tablet 11     Sig: TAKE 1 TABLET BY MOUTH EVERY DAY       ACE Inhibitors (Including Combos) Protocol Passed - 10/10/2019  1:25 AM        Passed - Blood pressure under 140/90 in past 12 months     BP Readings from Last 3 Encounters:   09/19/19 118/70   10/11/18 122/78   09/19/18 122/70                 Passed - Recent (12 mo) or future (30 days) visit within the authorizing provider's specialty     Patient has had an office visit with the authorizing provider or a provider within the authorizing providers department within the previous 12 mos or has a future within next 30 days. See \"Patient Info\" tab in inbasket, or \"Choose Columns\" in Meds & Orders section of the refill encounter.              Passed - Medication is active on med list        Passed - Patient is age 18 or older        Passed - Normal serum creatinine on file in past 12 months     Recent Labs   Lab Test 09/19/19  0919   CR 0.85             Passed - Normal serum potassium on file in past 12 months     Recent Labs   Lab Test 09/19/19  0919   POTASSIUM 3.9             levothyroxine (SYNTHROID/LEVOTHROID) 200 MCG tablet [Pharmacy Med Name: LEVOTHYROXINE 200 MCG TABLET]    Last Written Prescription Date:  9/19/2019  Last Fill Quantity: 90,  # refills: 11   Last office visit: 9/19/2019 with prescribing provider:  Vinod Chris     Future Office Visit:     90 tablet 11     Sig: TAKE 1 TABLET (200 MCG) BY MOUTH DAILY       Thyroid Protocol Passed - 10/10/2019  1:25 AM        Passed - Patient is 12 years or older        Passed - Recent (12 mo) or future (30 days) visit within the authorizing provider's specialty " "    Patient has had an office visit with the authorizing provider or a provider within the authorizing providers department within the previous 12 mos or has a future within next 30 days. See \"Patient Info\" tab in inbasket, or \"Choose Columns\" in Meds & Orders section of the refill encounter.              Passed - Medication is active on med list        Passed - Normal TSH on file in past 12 months     Recent Labs   Lab Test 09/19/19  0919   TSH 0.42                  "

## 2019-10-14 ENCOUNTER — TRANSFERRED RECORDS (OUTPATIENT)
Dept: HEALTH INFORMATION MANAGEMENT | Facility: CLINIC | Age: 64
End: 2019-10-14

## 2019-11-23 DIAGNOSIS — K21.9 GASTROESOPHAGEAL REFLUX DISEASE WITHOUT ESOPHAGITIS: ICD-10-CM

## 2019-11-23 NOTE — TELEPHONE ENCOUNTER
"Requested Prescriptions   Pending Prescriptions Disp Refills     omeprazole (PRILOSEC) 20 MG DR capsule [Pharmacy Med Name: OMEPRAZOLE DR 20 MG CAPSULE]  Last Written Prescription Date:  12/10/2018  Last Fill Quantity: 90 capsule,  # refills: 3   Last Office Visit: 9/19/2019 Smitha Vizcaino MD   Future Office Visit:      90 capsule 3     Sig: TAKE 1 CAPSULE BY MOUTH EVERY DAY       PPI Protocol Passed - 11/23/2019  1:25 AM        Passed - Not on Clopidogrel (unless Pantoprazole ordered)        Passed - No diagnosis of osteoporosis on record        Passed - Recent (12 mo) or future (30 days) visit within the authorizing provider's specialty     Patient has had an office visit with the authorizing provider or a provider within the authorizing providers department within the previous 12 mos or has a future within next 30 days. See \"Patient Info\" tab in inbasket, or \"Choose Columns\" in Meds & Orders section of the refill encounter.              Passed - Medication is active on med list        Passed - Patient is age 18 or older          "

## 2019-11-25 NOTE — TELEPHONE ENCOUNTER
Omeprazole  Prescription approved per [facility] [facility].    Madeline Wells RN

## 2020-06-09 ENCOUNTER — OFFICE VISIT (OUTPATIENT)
Dept: ORTHOPEDICS | Facility: CLINIC | Age: 65
End: 2020-06-09
Payer: COMMERCIAL

## 2020-06-09 ENCOUNTER — ANCILLARY PROCEDURE (OUTPATIENT)
Dept: GENERAL RADIOLOGY | Facility: CLINIC | Age: 65
End: 2020-06-09
Attending: FAMILY MEDICINE
Payer: COMMERCIAL

## 2020-06-09 VITALS
SYSTOLIC BLOOD PRESSURE: 128 MMHG | WEIGHT: 260 LBS | DIASTOLIC BLOOD PRESSURE: 80 MMHG | BODY MASS INDEX: 37.22 KG/M2 | HEIGHT: 70 IN

## 2020-06-09 DIAGNOSIS — M25.562 CHRONIC PAIN OF LEFT KNEE: Primary | ICD-10-CM

## 2020-06-09 DIAGNOSIS — M17.12 PRIMARY OSTEOARTHRITIS OF LEFT KNEE: ICD-10-CM

## 2020-06-09 DIAGNOSIS — M25.562 CHRONIC PAIN OF LEFT KNEE: ICD-10-CM

## 2020-06-09 DIAGNOSIS — G89.29 CHRONIC PAIN OF LEFT KNEE: ICD-10-CM

## 2020-06-09 DIAGNOSIS — G89.29 CHRONIC PAIN OF LEFT KNEE: Primary | ICD-10-CM

## 2020-06-09 PROCEDURE — 20611 DRAIN/INJ JOINT/BURSA W/US: CPT | Mod: LT | Performed by: FAMILY MEDICINE

## 2020-06-09 PROCEDURE — 99214 OFFICE O/P EST MOD 30 MIN: CPT | Mod: 25 | Performed by: FAMILY MEDICINE

## 2020-06-09 PROCEDURE — 73562 X-RAY EXAM OF KNEE 3: CPT

## 2020-06-09 ASSESSMENT — MIFFLIN-ST. JEOR: SCORE: 1970.6

## 2020-06-09 NOTE — PATIENT INSTRUCTIONS
1. Chronic pain of left knee    2. Primary osteoarthritis of left knee      Reviewed x-rays and osteoarthritis has worsened which is expected  Discussed risk of cortisone including decreased immune function and desires to proceed  Steroid injection of the left knee was performed today in clinic    - Would not soak in a hot tub, bath or swimming pool for 48 hours  - Ok to shower  - Ice today and only do your normal amounts of activity  - The lidocaine (what is giving you pain relief right now) will likely stop working in 1-2 hours.  You will then have pain again, similar to before you received the injection. The corticosteroid will not start working until approximately 1-2 weeks from now.  In a small percentage of people, cortisone can cause flushing/redness in the face. This usually lasts for 1-3 days and resolves. Cool compress and Ibuprofen/Tylenol can help if this happens.  Also discussed that pain and progression of arthritis is worsened by caring excess weight. A healthy weight for your height is 170 lbs which would give you a BMI less than 25.

## 2020-06-09 NOTE — LETTER
6/9/2020         RE: Manuel Schofield  [address]  [facility] [address]        Dear Colleague,    Thank you for referring your patient, Manuel Schofield, to the [facility]. Please see a copy of my visit note below.    ASSESSMENT & PLAN    1. Chronic pain of left knee    2. Primary osteoarthritis of left knee      Reviewed x-rays and osteoarthritis has worsened which is expected  Discussed risk of cortisone including decreased immune function and desires to proceed  Steroid injection of the left knee was performed today in clinic  Also discussed that pain and progression of arthritis is worsened by excess weight. Discussed what would be a healthy weight / BMI.    -----    SUBJECTIVE  Manuel Schofield is a/an 65 year old male who is seen as a self referral for evaluation of left knee pain. The patient is seen by themselves.    Onset: 2-3 week(s) ago. Reports insidious onset without acute precipitating event. Patient has had chronic knee pain for many years.  Location of Pain: left lateral knee and anterior knee inferior to patella  Rating of Pain at worst: 7/10  Rating of Pain Currently: 0/10 at rest  Worsened by: increased activity - walking, standing  Better with: rest, cortisone injections  Treatments tried: rest/activity avoidance and previous imaging (xray 9/1/2017), cortisone injection on 10/11/2018 with Dr. New - which provided great relief lasting approximately 1.5 years  Associated symptoms: swelling and feeling of instability  Orthopedic history: YES - patient reports h/o left knee injury in 1974  Relevant surgical history: NO  Patient Social History: retired    Patient's past medical, surgical, social, and family histories were reviewed today and no pertinent history related to patient's presenting problem.    REVIEW OF SYSTEMS:  10 point ROS is negative other than symptoms noted above in HPI, Past Medical History or as stated below  Constitutional: NEGATIVE for fever,  "chills, change in weight  Skin: NEGATIVE for worrisome rashes, moles or lesions  GI/: NEGATIVE for bowel or bladder changes  Neuro: NEGATIVE for weakness, dizziness or paresthesias    OBJECTIVE:  /80   Ht 1.778 m (5' 10\")   Wt 117.9 kg (260 lb)   BMI 37.31 kg/m     General: healthy, alert and in no distress  HEENT: no scleral icterus or conjunctival erythema  Skin: no suspicious lesions or rash. No jaundice.  CV: no pedal edema  Resp: normal respiratory effort without conversational dyspnea   Psych: normal mood and affect  Gait: normal steady gait with appropriate coordination and balance  Neuro: Normal light sensory exam of lower extremity  MSK:  LEFT KNEE  Inspection:    normal alignment  Palpation:    Tender about the lateral joint line and medial joint line. Remainder of bony and ligamentous landmarks are nontender.    No effusion is present    Patellofemoral crepitus is Present  Range of Motion:     00 extension to 1350 flexion  Strength:    Extensor mechanism intact  Special Tests:    Negative: MCL/valgus stress (0 & 30 deg), LCL/varus stress (0 & 30 deg), Lachman's, posterior drawer, Adriana's    Independent visualization of the below image:  X-ray Left Knee  Complete loss of medial joint space with bony deformation consistent with advanced osteoarthritis.  Findings have progressed since previous.  Small calcification in posterior femoral notch likely loose body.  No fracture or acute osseous findings.    Gustavo Renee DO [facility]      Again, thank you for allowing me to participate in the care of your patient.        Sincerely,        Gustavo Renee DO    "

## 2020-06-09 NOTE — PROGRESS NOTES
"ASSESSMENT & PLAN    1. Chronic pain of left knee    2. Primary osteoarthritis of left knee      Reviewed x-rays and osteoarthritis has worsened which is expected  Discussed risk of cortisone including decreased immune function and desires to proceed  Steroid injection of the left knee was performed today in clinic  Also discussed that pain and progression of arthritis is worsened by excess weight. Discussed what would be a healthy weight / BMI.    -----    SUBJECTIVE  Manuel Schofield is a/an 65 year old male who is seen as a self referral for evaluation of left knee pain. The patient is seen by themselves.    Onset: 2-3 week(s) ago. Reports insidious onset without acute precipitating event. Patient has had chronic knee pain for many years.  Location of Pain: left lateral knee and anterior knee inferior to patella  Rating of Pain at worst: 7/10  Rating of Pain Currently: 0/10 at rest  Worsened by: increased activity - walking, standing  Better with: rest, cortisone injections  Treatments tried: rest/activity avoidance and previous imaging (xray 9/1/2017), cortisone injection on 10/11/2018 with Dr. New - which provided great relief lasting approximately 1.5 years  Associated symptoms: swelling and feeling of instability  Orthopedic history: YES - patient reports h/o left knee injury in 1974  Relevant surgical history: NO  Patient Social History: retired    Patient's past medical, surgical, social, and family histories were reviewed today and no pertinent history related to patient's presenting problem.    REVIEW OF SYSTEMS:  10 point ROS is negative other than symptoms noted above in HPI, Past Medical History or as stated below  Constitutional: NEGATIVE for fever, chills, change in weight  Skin: NEGATIVE for worrisome rashes, moles or lesions  GI/: NEGATIVE for bowel or bladder changes  Neuro: NEGATIVE for weakness, dizziness or paresthesias    OBJECTIVE:  /80   Ht 1.778 m (5' 10\")   Wt 117.9 kg (260 lb) "   BMI 37.31 kg/m     General: healthy, alert and in no distress  HEENT: no scleral icterus or conjunctival erythema  Skin: no suspicious lesions or rash. No jaundice.  CV: no pedal edema  Resp: normal respiratory effort without conversational dyspnea   Psych: normal mood and affect  Gait: normal steady gait with appropriate coordination and balance  Neuro: Normal light sensory exam of lower extremity  MSK:  LEFT KNEE  Inspection:    normal alignment  Palpation:    Tender about the lateral joint line and medial joint line. Remainder of bony and ligamentous landmarks are nontender.    No effusion is present    Patellofemoral crepitus is Present  Range of Motion:     00 extension to 1350 flexion  Strength:    Extensor mechanism intact  Special Tests:    Negative: MCL/valgus stress (0 & 30 deg), LCL/varus stress (0 & 30 deg), Lachman's, posterior drawer, Adriana's    Independent visualization of the below image:  X-ray Left Knee  Complete loss of medial joint space with bony deformation consistent with advanced osteoarthritis.  Findings have progressed since previous.  Small calcification in posterior femoral notch likely loose body.  No fracture or acute osseous findings.    Large Joint Injection/Arthocentesis: L knee joint    Date/Time: 6/22/2020 10:49 AM  Performed by: Gustavo Renee DO  Authorized by: Gustavo Renee DO     Indications:  Osteoarthritis and pain  Needle Size:  22 G  Guidance: ultrasound    Approach:  Superolateral  Location:  Knee      Medications:  40 mg methylPREDNISolone 40 MG/ML; 3 mL ropivacaine 5 MG/ML  Outcome:  Tolerated well, no immediate complications  Procedure discussed: discussed risks, benefits, and alternatives    Consent Given by:  Patient  Timeout: timeout called immediately prior to procedure    Prep: patient was prepped and draped in usual sterile fashion                 Gustavo Renee DO [facility]

## 2020-06-22 PROCEDURE — 20611 DRAIN/INJ JOINT/BURSA W/US: CPT | Mod: LT | Performed by: FAMILY MEDICINE

## 2020-06-22 RX ORDER — METHYLPREDNISOLONE ACETATE 40 MG/ML
40 INJECTION, SUSPENSION INTRA-ARTICULAR; INTRALESIONAL; INTRAMUSCULAR; SOFT TISSUE
Status: DISCONTINUED | OUTPATIENT
Start: 2020-06-22 | End: 2021-03-08

## 2020-06-22 RX ORDER — ROPIVACAINE HYDROCHLORIDE 5 MG/ML
3 INJECTION, SOLUTION EPIDURAL; INFILTRATION; PERINEURAL
Status: DISCONTINUED | OUTPATIENT
Start: 2020-06-22 | End: 2021-03-08

## 2020-06-22 RX ADMIN — ROPIVACAINE HYDROCHLORIDE 3 ML: 5 INJECTION, SOLUTION EPIDURAL; INFILTRATION; PERINEURAL at 10:49

## 2020-06-22 RX ADMIN — METHYLPREDNISOLONE ACETATE 40 MG: 40 INJECTION, SUSPENSION INTRA-ARTICULAR; INTRALESIONAL; INTRAMUSCULAR; SOFT TISSUE at 10:49

## 2020-09-21 ASSESSMENT — ENCOUNTER SYMPTOMS
FREQUENCY: 0
PALPITATIONS: 0
MYALGIAS: 0
HEARTBURN: 0
DIARRHEA: 0
COUGH: 0
ARTHRALGIAS: 0
JOINT SWELLING: 0
EYE PAIN: 0
HEMATURIA: 0
WEAKNESS: 0
HEMATOCHEZIA: 0
SHORTNESS OF BREATH: 0
DIZZINESS: 0
SORE THROAT: 0
FEVER: 0
HEADACHES: 0
NERVOUS/ANXIOUS: 0
CONSTIPATION: 0
PARESTHESIAS: 0
DYSURIA: 0
ABDOMINAL PAIN: 0
NAUSEA: 0
CHILLS: 0

## 2020-09-21 ASSESSMENT — ACTIVITIES OF DAILY LIVING (ADL): CURRENT_FUNCTION: NO ASSISTANCE NEEDED

## 2020-09-21 ASSESSMENT — PATIENT HEALTH QUESTIONNAIRE - PHQ9
SUM OF ALL RESPONSES TO PHQ QUESTIONS 1-9: 6
SUM OF ALL RESPONSES TO PHQ QUESTIONS 1-9: 6
10. IF YOU CHECKED OFF ANY PROBLEMS, HOW DIFFICULT HAVE THESE PROBLEMS MADE IT FOR YOU TO DO YOUR WORK, TAKE CARE OF THINGS AT HOME, OR GET ALONG WITH OTHER PEOPLE: SOMEWHAT DIFFICULT

## 2020-09-22 ENCOUNTER — OFFICE VISIT (OUTPATIENT)
Dept: PEDIATRICS | Facility: CLINIC | Age: 65
End: 2020-09-22
Payer: COMMERCIAL

## 2020-09-22 VITALS
DIASTOLIC BLOOD PRESSURE: 74 MMHG | SYSTOLIC BLOOD PRESSURE: 122 MMHG | RESPIRATION RATE: 16 BRPM | WEIGHT: 254 LBS | OXYGEN SATURATION: 97 % | HEART RATE: 59 BPM | TEMPERATURE: 97.9 F | BODY MASS INDEX: 36.36 KG/M2 | HEIGHT: 70 IN

## 2020-09-22 DIAGNOSIS — I10 ESSENTIAL HYPERTENSION: ICD-10-CM

## 2020-09-22 DIAGNOSIS — Z00.00 ENCOUNTER FOR MEDICARE ANNUAL WELLNESS EXAM: Primary | ICD-10-CM

## 2020-09-22 DIAGNOSIS — Z12.5 SCREENING FOR PROSTATE CANCER: ICD-10-CM

## 2020-09-22 DIAGNOSIS — Z80.42 FAMILY HISTORY OF PROSTATE CANCER: ICD-10-CM

## 2020-09-22 DIAGNOSIS — E89.0 POSTOPERATIVE HYPOTHYROIDISM: ICD-10-CM

## 2020-09-22 DIAGNOSIS — K50.00 CROHN'S DISEASE OF SMALL INTESTINE WITHOUT COMPLICATION (H): ICD-10-CM

## 2020-09-22 DIAGNOSIS — K21.9 GASTROESOPHAGEAL REFLUX DISEASE WITHOUT ESOPHAGITIS: ICD-10-CM

## 2020-09-22 DIAGNOSIS — Z23 NEED FOR PNEUMOCOCCAL VACCINATION: ICD-10-CM

## 2020-09-22 DIAGNOSIS — Z23 NEED FOR PROPHYLACTIC VACCINATION AND INOCULATION AGAINST INFLUENZA: ICD-10-CM

## 2020-09-22 DIAGNOSIS — G71.09 HEREDITARY PROGRESSIVE MUSCULAR DYSTROPHY (H): ICD-10-CM

## 2020-09-22 LAB
ALBUMIN SERPL-MCNC: 3.7 G/DL (ref 3.4–5)
ALP SERPL-CCNC: 58 U/L (ref 40–150)
ALT SERPL W P-5'-P-CCNC: 30 U/L (ref 0–70)
ANION GAP SERPL CALCULATED.3IONS-SCNC: 6 MMOL/L (ref 3–14)
AST SERPL W P-5'-P-CCNC: 21 U/L (ref 0–45)
BILIRUB SERPL-MCNC: 0.7 MG/DL (ref 0.2–1.3)
BUN SERPL-MCNC: 11 MG/DL (ref 7–30)
CALCIUM SERPL-MCNC: 8.7 MG/DL (ref 8.5–10.1)
CHLORIDE SERPL-SCNC: 106 MMOL/L (ref 94–109)
CHOLEST SERPL-MCNC: 184 MG/DL
CO2 SERPL-SCNC: 24 MMOL/L (ref 20–32)
CREAT SERPL-MCNC: 0.79 MG/DL (ref 0.66–1.25)
GFR SERPL CREATININE-BSD FRML MDRD: >90 ML/MIN/{1.73_M2}
GLUCOSE SERPL-MCNC: 98 MG/DL (ref 70–99)
HDLC SERPL-MCNC: 38 MG/DL
LDLC SERPL CALC-MCNC: 116 MG/DL
NONHDLC SERPL-MCNC: 146 MG/DL
POTASSIUM SERPL-SCNC: 3.7 MMOL/L (ref 3.4–5.3)
PROT SERPL-MCNC: 7.6 G/DL (ref 6.8–8.8)
PSA SERPL-ACNC: 0.69 UG/L (ref 0–4)
SODIUM SERPL-SCNC: 136 MMOL/L (ref 133–144)
TRIGL SERPL-MCNC: 150 MG/DL
TSH SERPL DL<=0.005 MIU/L-ACNC: 0.5 MU/L (ref 0.4–4)

## 2020-09-22 PROCEDURE — 36415 COLL VENOUS BLD VENIPUNCTURE: CPT | Performed by: INTERNAL MEDICINE

## 2020-09-22 PROCEDURE — 80061 LIPID PANEL: CPT | Performed by: INTERNAL MEDICINE

## 2020-09-22 PROCEDURE — 90662 IIV NO PRSV INCREASED AG IM: CPT | Performed by: INTERNAL MEDICINE

## 2020-09-22 PROCEDURE — 80053 COMPREHEN METABOLIC PANEL: CPT | Performed by: INTERNAL MEDICINE

## 2020-09-22 PROCEDURE — 90471 IMMUNIZATION ADMIN: CPT | Performed by: INTERNAL MEDICINE

## 2020-09-22 PROCEDURE — 90472 IMMUNIZATION ADMIN EACH ADD: CPT | Performed by: INTERNAL MEDICINE

## 2020-09-22 PROCEDURE — G0103 PSA SCREENING: HCPCS | Performed by: INTERNAL MEDICINE

## 2020-09-22 PROCEDURE — 99397 PER PM REEVAL EST PAT 65+ YR: CPT | Mod: 25 | Performed by: INTERNAL MEDICINE

## 2020-09-22 PROCEDURE — 84443 ASSAY THYROID STIM HORMONE: CPT | Performed by: INTERNAL MEDICINE

## 2020-09-22 PROCEDURE — 90732 PPSV23 VACC 2 YRS+ SUBQ/IM: CPT | Performed by: INTERNAL MEDICINE

## 2020-09-22 RX ORDER — LEVOTHYROXINE SODIUM 200 UG/1
200 TABLET ORAL DAILY
Qty: 90 TABLET | Refills: 3 | Status: ON HOLD | OUTPATIENT
Start: 2020-09-22 | End: 2021-03-08

## 2020-09-22 RX ORDER — LISINOPRIL 10 MG/1
10 TABLET ORAL DAILY
Qty: 90 TABLET | Refills: 3 | Status: SHIPPED | OUTPATIENT
Start: 2020-09-22 | End: 2021-10-11

## 2020-09-22 ASSESSMENT — ACTIVITIES OF DAILY LIVING (ADL): CURRENT_FUNCTION: NO ASSISTANCE NEEDED

## 2020-09-22 ASSESSMENT — ENCOUNTER SYMPTOMS
DIARRHEA: 0
NERVOUS/ANXIOUS: 0
JOINT SWELLING: 0
FEVER: 0
NAUSEA: 0
SORE THROAT: 0
CHILLS: 0
PALPITATIONS: 0
PARESTHESIAS: 0
SHORTNESS OF BREATH: 0
EYE PAIN: 0
ABDOMINAL PAIN: 0
HEMATOCHEZIA: 0
CONSTIPATION: 0
MYALGIAS: 0
HEARTBURN: 0
WEAKNESS: 0
DIZZINESS: 0
ARTHRALGIAS: 0
FREQUENCY: 0
COUGH: 0
DYSURIA: 0
HEMATURIA: 0
HEADACHES: 0

## 2020-09-22 ASSESSMENT — MIFFLIN-ST. JEOR: SCORE: 1943.39

## 2020-09-22 ASSESSMENT — PATIENT HEALTH QUESTIONNAIRE - PHQ9: SUM OF ALL RESPONSES TO PHQ QUESTIONS 1-9: 6

## 2020-09-22 NOTE — PROGRESS NOTES
"SUBJECTIVE:   Manuel Schofield is a 65 year old male who presents for Preventive Visit.      Patient has been advised of split billing requirements and indicates understanding: Yes   Are you in the first 12 months of your Medicare coverage?  No    Healthy Habits:     In general, how would you rate your overall health?  Fair    Frequency of exercise:  2-3 days/week    Duration of exercise:  Less than 15 minutes    Do you usually eat at least 4 servings of fruit and vegetables a day, include whole grains    & fiber and avoid regularly eating high fat or \"junk\" foods?  No    Taking medications regularly:  Yes    Medication side effects:  None    Ability to successfully perform activities of daily living:  No assistance needed    Home Safety:  No safety concerns identified    Hearing Impairment:  Need to ask people to speak up or repeat themselves    In the past 6 months, have you been bothered by leaking of urine?  No    In general, how would you rate your overall mental or emotional health?  Fair      PHQ-2 Total Score: 3    Additional concerns today:  No    Do you feel safe in your environment? Yes    Have you ever done Advance Care Planning? (For example, a Health Directive, POLST, or a discussion with a medical provider or your loved ones about your wishes): No      Fall risk  Fallen 2 or more times in the past year?: No  Any fall with injury in the past year?: No    Cognitive Screening   1) Repeat 3 items (Leader, Season, Table)  done  2) Clock draw:  Unable due to upper extremity weakness  3) 3 item recall: Recalls 2 objects   Results:  items recalled: COGNITIVE IMPAIRMENT LESS LIKELY    Mini-CogTM Copyright VERNON Huynh. Licensed by the author for use in [facility]; reprinted with permission (yvette@.[facility]). All rights reserved.      Do you have sleep apnea, excessive snoring or daytime drowsiness?: no    Reviewed and updated as needed this visit by clinical staff  Tobacco  Allergies  Meds  Med Hx  " Surg Hx  Fam Hx  Soc Hx        Reviewed and updated as needed this visit by Provider  Meds        Social History     Tobacco Use     Smoking status: Former Smoker     Packs/day: 1.00     Years: 40.00     Pack years: 40.00     Types: Cigarettes     Last attempt to quit: 2020     Years since quittin.6     Smokeless tobacco: Never Used   Substance Use Topics     Alcohol use: Yes     Alcohol/week: 0.0 standard drinks     Frequency: Monthly or less     Drinks per session: 1 or 2     Binge frequency: Never     Comment: rare         Alcohol Use 2020   Prescreen: >3 drinks/day or >7 drinks/week? No   Prescreen: >3 drinks/day or >7 drinks/week? -           Would like to discuss bloating has lost 17lbs.    Current providers sharing in care for this patient include:   Patient Care Team:  Vinod Chris MD as PCP - Mitali Moore MD as Assigned PCP    The following health maintenance items are reviewed in Epic and correct as of today:  Health Maintenance   Topic Date Due     ANNUAL REVIEW OF HM ORDERS  1955     HIV SCREENING  1970     ZOSTER IMMUNIZATION (2 of 3) 2015     FALL RISK ASSESSMENT  2020     PNEUMOCOCCAL IMMUNIZATION 65+ LOW/MEDIUM RISK (2 of 2 - PPSV23) 2020     MEDICARE ANNUAL WELLNESS VISIT  2020     TSH W/FREE T4 REFLEX  2020     ADVANCE CARE PLANNING  10/25/2021     COLORECTAL CANCER SCREENING  2023     LIPID  2024     DTAP/TDAP/TD IMMUNIZATION (3 - Td) 2027     HEPATITIS C SCREENING  Completed     PHQ-2  Completed     AORTIC ANEURYSM SCREENING (SYSTEM ASSIGNED)  Completed     IPV IMMUNIZATION  Aged Out     MENINGITIS IMMUNIZATION  Aged Out     HEPATITIS B IMMUNIZATION  Aged Out     INFLUENZA VACCINE  Discontinued     Patient Active Problem List   Diagnosis     Hereditary progressive muscular dystrophy (H)     Tinnitus     CARDIOVASCULAR SCREENING; LDL GOAL LESS THAN 130     Health Care Home     Advanced directives,  counseling/discussion     Family history of prostate cancer     Obesity     Postoperative hypothyroidism     Gastroesophageal reflux disease without esophagitis     Crohn's disease of small intestine without complication (H)     Past Medical History:   Diagnosis Date     Chest pain, unspecified 10/04    adenosine cardiolyte:ECG negative, no ischemia, EF 59%     Esophageal reflux     EGD 2005 normal     Hereditary progressive muscular dystrophy (H)      Internal hemorrhoids without mention of complication      Unspecified tinnitus     chronic, ENT evaluation       Past Surgical History:   Procedure Laterality Date     APPENDECTOMY       CHOLECYSTECTOMY       COLONOSCOPY  7/8/2013    Colonoscopy Dr. Pruitt [facility]     COLONOSCOPY  7/8/2013    Procedure: COLONOSCOPY;  Colonoscopy ;  Surgeon: Vinod Pruitt MD;  Location:  GI     ENT SURGERY      T&A as a child     HC THYROID LOBECTOMY,UNILAT  1990     HC THYROID LOBECTOMY,UNILAT  2008    resection goiter       Family History   Problem Relation Age of Onset     Heart Disease Mother      Cancer - colorectal Mother      Alzheimer Disease Father      Neurologic Disorder Father      Gastrointestinal Disease Brother         colon polyps     Diabetes Sister      Diabetes Brother        ALLERGIES     Allergies   Allergen Reactions     Peanut-Derived      Seasonal Allergies          Review of Systems   Constitutional: Negative for chills and fever.   HENT: Negative for congestion, ear pain, hearing loss and sore throat.    Eyes: Negative for pain and visual disturbance.   Respiratory: Negative for cough and shortness of breath.    Cardiovascular: Negative for chest pain, palpitations and peripheral edema.   Gastrointestinal: Negative for abdominal pain, constipation, diarrhea, heartburn, hematochezia and nausea.   Genitourinary: Positive for impotence. Negative for discharge, dysuria, frequency, genital sores, hematuria and urgency.   Musculoskeletal: Negative for  "arthralgias, joint swelling and myalgias.   Skin: Negative for rash.   Neurological: Negative for dizziness, weakness, headaches and paresthesias.   Psychiatric/Behavioral: Negative for mood changes. The patient is not nervous/anxious.      Current Outpatient Medications   Medication Sig Dispense Refill     Fexofenadine HCl (ALLEGRA PO)        levothyroxine (SYNTHROID/LEVOTHROID) 200 MCG tablet Take 1 tablet (200 mcg) by mouth daily 90 tablet 3     lisinopril (ZESTRIL) 10 MG tablet Take 1 tablet (10 mg) by mouth daily 90 tablet 3     mesalamine (PENTASA) 500 MG CPCR CR capsule Take 3 capsules (1,500 mg) by mouth 2 times daily 540 capsule 3     omeprazole (PRILOSEC) 20 MG DR capsule Take 1 capsule (20 mg) by mouth daily as needed 90 capsule 3        OBJECTIVE:   /74 (BP Location: Right arm, Patient Position: Chair, Cuff Size: Adult Large)   Pulse 59   Temp 97.9  F (36.6  C) (Tympanic)   Resp 16   Ht 1.778 m (5' 10\")   Wt 115.2 kg (254 lb)   SpO2 97%   BMI 36.45 kg/m   Estimated body mass index is 36.45 kg/m  as calculated from the following:    Height as of this encounter: 1.778 m (5' 10\").    Weight as of this encounter: 115.2 kg (254 lb).  Physical Exam  GENERAL: healthy, alert and no distress  EYES: Eyes grossly normal to inspection, PERRL and conjunctivae and sclerae normal  HENT: ear canals and TM's normal, nose and mouth without ulcers or lesions  NECK: no adenopathy, no asymmetry, masses, or scars and thyroid normal to palpation  RESP: lungs clear to auscultation - no rales, rhonchi or wheezes  CV: regular rate and rhythm, normal S1 S2, no S3 or S4, no murmur, click or rub, no peripheral edema and peripheral pulses strong  ABDOMEN: soft, nontender, no hepatosplenomegaly, no masses and bowel sounds normal  MS: no gross musculoskeletal defects noted, no edema  SKIN: no suspicious lesions or rashes  NEURO: upper extremity neuromuscular weakness and atrophic changes b/l  PSYCH: mentation appears " "normal, affect normal/bright    ASSESSMENT / PLAN:       ICD-10-CM    1. Encounter for Medicare annual wellness exam  Z00.00        2. Hereditary progressive muscular dystrophy (H)  G71.09 Primarily upper extremity weakness.  Continues to live independently      3. Crohn's disease of small intestine without complication (H)  K50.00 Stable.  Continue pentasa.   Notes episodic bloating and constipation.   rec trial of miralax.   Will schedule follow-up this Fall with GI     4. Essential hypertension  I10 lisinopril (ZESTRIL) 10 MG tablet     Comprehensive metabolic panel     Lipid panel reflex to direct LDL Fasting      Adequate control.  Continue current rx without changes.      5. Postoperative hypothyroidism  E89.0 levothyroxine (SYNTHROID/LEVOTHROID) 200 MCG tablet     TSH with free T4 reflex        6. Gastroesophageal reflux disease without esophagitis  K21.9 omeprazole (PRILOSEC) 20 MG DR capsule         7. Family history of prostate cancer  Z80.42    8. Screening for prostate cancer  Z12.5 Prostate spec antigen screen     9. Need for prophylactic vaccination and inoculation against influenza  Z23 FLUZONE HIGH DOSE 65+  [26371]     Vaccine Administration, Initial [40327]     10. Need for pneumococcal vaccination  Z23 PPSV23, IM/SUBQ (2+ YRS) - [id number]       Patient has been advised of split billing requirements and indicates understanding: Yes  COUNSELING:  Reviewed preventive health counseling, as reflected in patient instructions       Regular exercise       Healthy diet/nutrition       Colon cancer screening       Prostate cancer screening    Estimated body mass index is 36.45 kg/m  as calculated from the following:    Height as of this encounter: 1.778 m (5' 10\").    Weight as of this encounter: 115.2 kg (254 lb).    Weight management plan: Discussed healthy diet and exercise guidelines    He reports that he quit smoking about 7 months ago. His smoking use included cigarettes. He has a 40.00 pack-year " smoking history. He has never used smokeless tobacco.      Appropriate preventive services were discussed with this patient, including applicable screening as appropriate for cardiovascular disease, diabetes, osteopenia/osteoporosis, and glaucoma.  As appropriate for age/gender, discussed screening for colorectal cancer, prostate cancer, breast cancer, and cervical cancer. Checklist reviewing preventive services available has been given to the patient.    Reviewed patients plan of care and provided an AVS. The Basic Care Plan (routine screening as documented in Health Maintenance) for Manuel meets the Care Plan requirement. This Care Plan has been established and reviewed with the Patient.    Counseling Resources:  ATP IV Guidelines  Pooled Cohorts Equation Calculator  Breast Cancer Risk Calculator  Breast Cancer: Medication to Reduce Risk  FRAX Risk Assessment  ICSI Preventive Guidelines  Dietary Guidelines for Americans, 2010  Kuaidi Dache's MyPlate  ASA Prophylaxis  Lung CA Screening    Vinod Chris MD, MD  [facility]    Identified Health Risks:

## 2020-09-22 NOTE — PATIENT INSTRUCTIONS
Patient Education   Personalized Prevention Plan  You are due for the preventive services outlined below.  Your care team is available to assist you in scheduling these services.  If you have already completed any of these items, please share that information with your care team to update in your medical record.  Health Maintenance Due   Topic Date Due     ANNUAL REVIEW OF HM ORDERS  1955     HIV Screening  04/01/1970     Zoster (Shingles) Vaccine (2 of 3) 12/24/2015     FALL RISK ASSESSMENT  09/19/2020     Pneumococcal Vaccine (2 of 2 - PPSV23) 04/01/2020     Annual Wellness Visit  09/19/2020     Thyroid Function Lab  09/19/2020       Depression and Suicide in Older Adults    Nearly 2 million older Americans have some type of depression. Sadly, some of them even take their own lives. Yet depression among older adults is often ignored. Learn the warning signs. You may help spare a loved one needless pain. You may also save a life.  What is depression?  Depression is a serious illness that affects the way you think and feel. It is not a normal part of aging, nor is it a sign of weakness, a character flaw, or something you can snap out of. Most people with depression need treatment to get better. The most common symptom is a feeling of deep sadness. People who are depressed also may seem tired and listless. And nothing seems to give them pleasure. It s normal to grieve or be sad sometimes. But sadness lessens or passes with time. Depression rarely goes away or improves on its own. Other symptoms of depression are:    Sleeping more or less than normal    Eating more or less than normal    Having headaches, stomachaches, or other pains that don t go away    Feeling nervous,  empty,  or worthless    Crying a great deal    Thinking or talking about suicide or death    Feeling confused or forgetful  What causes it?  The causes of depression aren t fully known. But, it is thought to result from a complex interaction  of biochemistry, genetics, environmental factors, and personality. Certain chemicals in the brain play a role. Depression does run in families. And life stresses can also trigger depression in some people. Older adults often face many stressors, such as death of friends or a spouse, health problems, and financial concerns.  How you can help  Often, depressed people may not want to ask for help. When they do, they may be ignored. Or, they may receive the wrong treatment. You can help by showing parents and older friends love and support. If they seem depressed, help them find the right treatment. Talk to your doctor. Or contact a local mental health center, social service agency, or hospital. With modern treatment, no one has to suffer from depression.  If your older friend or family member agrees, you can be an advocate for him or her in the healthcare setting. Many times, older adults have other chronic illnesses such as diabetes, heart disease, or cancer that can cause symptoms of depression. Medicine side effects can also contribute to certain behaviors and feelings. It is important that the older adult's healthcare provider listens and sorts out the causes of any symptoms of depression and makes referrals to mental health specialists when needed. Untreated depression can result in misdiagnosis, including brain disorders such as dementia and Alzheimer's. If the health professional does not take the issue of depression seriously, ask your family member or friend to consider finding another provider.  Resources    National Suicide Prevention Lifeline (crisis hotline)674-489-TVLF (1118)    National [place] of Mental Levktx573-388-7972ogd.[facility].nih.gov    National [place] on Mental Mucvxyn877-839-5675syl.doug.org    Mental Health Jeikwiv229-411-9851lbq.[facility].org    National Suicide Pgcymvq374-860-6411 (800-SUICIDE)   Date Last Reviewed: 2/1/2017 2000-2019 [employer]. [address] [facility],  [place] [address]. All rights reserved. This information is not intended as a substitute for professional medical care. Always follow your healthcare professional's instructions.           Patient Education   Personalized Prevention Plan  You are due for the preventive services outlined below.  Your care team is available to assist you in scheduling these services.  If you have already completed any of these items, please share that information with your care team to update in your medical record.  Health Maintenance Due   Topic Date Due     ANNUAL REVIEW OF HM ORDERS  1955     HIV Screening  04/01/1970     Zoster (Shingles) Vaccine (2 of 3) 12/24/2015     FALL RISK ASSESSMENT  09/19/2020     Pneumococcal Vaccine (2 of 2 - PPSV23) 04/01/2020     Annual Wellness Visit  09/19/2020     Thyroid Function Lab  09/19/2020     Preventive Health Recommendations  See your health care provider every year to    Review health changes.     Discuss preventive care.      Review your medicines if your doctor has prescribed any.    Talk with your health care provider about whether you should have a test to screen for prostate cancer (PSA).    Every 3 years, have a diabetes test (fasting glucose). If you are at risk for diabetes, you should have this test more often.    Every 5 years, have a cholesterol test. Have this test more often if you are at risk for high cholesterol or heart disease.     Every 10 years, have a colonoscopy. Or, have a yearly FIT test (stool test). These exams will check for colon cancer.    Talk to with your health care provider about screening for Abdominal Aortic Aneurysm if you have a family history of AAA or have a history of smoking.    Shots:     Get a flu shot each year.     Get a tetanus shot every 10 years.     Talk to your doctor about your pneumonia vaccines. There are now two you should receive - Pneumovax (PPSV 23) and Prevnar (PCV 13).    Talk to your pharmacist about a shingles vaccine.      Talk to your doctor about the hepatitis B vaccine.    Nutrition:     Eat at least 5 servings of fruits and vegetables each day.     Eat whole-grain bread, whole-wheat pasta and brown rice instead of white grains and rice.     Get adequate Calcium and Vitamin D.     Lifestyle    Exercise for at least 150 minutes a week (30 minutes a day, 5 days a week). This will help you control your weight and prevent disease.     Limit alcohol to one drink per day.     No smoking.     Wear sunscreen to prevent skin cancer.     See your dentist every six months for an exam and cleaning.     See your eye doctor every 1 to 2 years to screen for conditions such as glaucoma, macular degeneration and cataracts.    Personalized Prevention Plan  You are due for the preventive services outlined below.  Your care team is available to assist you in scheduling these services.  If you have already completed any of these items, please share that information with your care team to update in your medical record.  Health Maintenance   Topic Date Due     ANNUAL REVIEW OF HM ORDERS  1955     HIV SCREENING  04/01/1970     ZOSTER IMMUNIZATION (2 of 3) 12/24/2015     FALL RISK ASSESSMENT  09/19/2020     PNEUMOCOCCAL IMMUNIZATION 65+ LOW/MEDIUM RISK (2 of 2 - PPSV23) 04/01/2020     MEDICARE ANNUAL WELLNESS VISIT  09/19/2020     TSH W/FREE T4 REFLEX  09/19/2020     ADVANCE CARE PLANNING  10/25/2021     COLORECTAL CANCER SCREENING  08/03/2023     LIPID  09/19/2024     DTAP/TDAP/TD IMMUNIZATION (3 - Td) 06/13/2027     HEPATITIS C SCREENING  Completed     PHQ-2  Completed     AORTIC ANEURYSM SCREENING (SYSTEM ASSIGNED)  Completed     IPV IMMUNIZATION  Aged Out     MENINGITIS IMMUNIZATION  Aged Out     HEPATITIS B IMMUNIZATION  Aged Out     INFLUENZA VACCINE  Discontinued       Understanding USDA MyPlate  The USDA (U.S. Department of Agriculture) has guidelines to help you make healthy food choices. These are called MyPlate. MyPlate shows the food  groups that make up healthy meals using the image of a place setting. Before you eat, think about the healthiest choices for what to put onto your plate or into your cup or bowl. To learn more about building a healthy plate, visit www.choosemyplate.gov.    The food groups    Fruits. Any fruit or 100% fruit juice counts as part of the Fruit Group. Fruits may be fresh, canned, frozen, or dried, and may be whole, cut-up, or pureed. Make half your plate fruits and vegetables.    Vegetables. Any vegetable or 100% vegetable juice counts as a member of the Vegetable Group. Vegetables may be fresh, frozen, canned, or dried. They can be served raw or cooked and may be whole, cut-up, or mashed. Make half your plate fruits and vegetables.    Grains. All foods made from grains are part of the Grains Group. These include wheat, rice, oats, cornmeal, and barley such as bread, pasta, oatmeal, cereal, tortillas, and grits. Grains should be no more than a quarter of your plate. At least half of your grains should be whole grains.    Protein. This group includes meat, poultry, seafood, beans and peas, eggs, processed soy products (like tofu), nuts (including nut butters), and seeds. Make protein choices no more than a quarter of your plate. Meat and poultry choices should be lean or low fat.    Dairy. All fluid milk products and foods made from milk that contain calcium, like yogurt and cheese, are part of the Dairy Group. (Foods that have little calcium, such as cream, butter, and cream cheese, are not part of the group.) Most dairy choices should be low-fat or fat-free.    Oils. These are fats that are liquid at room temperature. They include canola, corn, olive, soybean, and sunflower oil. Foods that are mainly oil include mayonnaise, certain salad dressings, and soft margarines. You should have only 5 to 7 teaspoons of oils a day. You probably already get this much from the food you eat.  Date Last Reviewed: 8/1/2017 2000-2019  [employer]. [address] [place] [address], [place] [address]. All rights reserved. This information is not intended as a substitute for professional medical care. Always follow your healthcare professional's instructions.

## 2020-12-07 ENCOUNTER — TRANSFERRED RECORDS (OUTPATIENT)
Dept: HEALTH INFORMATION MANAGEMENT | Facility: CLINIC | Age: 65
End: 2020-12-07

## 2021-01-15 ENCOUNTER — MYC MEDICAL ADVICE (OUTPATIENT)
Dept: PEDIATRICS | Facility: CLINIC | Age: 66
End: 2021-01-15

## 2021-03-07 ENCOUNTER — TRANSFERRED RECORDS (OUTPATIENT)
Dept: HEALTH INFORMATION MANAGEMENT | Facility: CLINIC | Age: 66
End: 2021-03-07

## 2021-03-07 LAB
CREAT SERPL-MCNC: 0.8 MG/DL (ref 0.7–1.3)
GFR SERPL CREATININE-BSD FRML MDRD: >60 ML/MIN/1.73M2
GLUCOSE SERPL-MCNC: 144 MG/DL (ref 70–125)
INR PPP: 1.04 (ref 0.9–1.1)
POTASSIUM SERPL-SCNC: 3.7 MMOL/L (ref 3.5–5)

## 2021-03-08 ENCOUNTER — APPOINTMENT (OUTPATIENT)
Dept: NUCLEAR MEDICINE | Facility: CLINIC | Age: 66
End: 2021-03-08
Attending: INTERNAL MEDICINE
Payer: COMMERCIAL

## 2021-03-08 ENCOUNTER — HOSPITAL ENCOUNTER (OUTPATIENT)
Facility: CLINIC | Age: 66
Setting detail: OBSERVATION
Discharge: HOME | End: 2021-03-08
Attending: HOSPITALIST | Admitting: INTERNAL MEDICINE
Payer: COMMERCIAL

## 2021-03-08 ENCOUNTER — COMMUNICATION - HEALTHEAST (OUTPATIENT)
Dept: SCHEDULING | Facility: CLINIC | Age: 66
End: 2021-03-08

## 2021-03-08 VITALS
TEMPERATURE: 96.5 F | HEIGHT: 70 IN | BODY MASS INDEX: 36.97 KG/M2 | WEIGHT: 258.2 LBS | DIASTOLIC BLOOD PRESSURE: 64 MMHG | RESPIRATION RATE: 18 BRPM | SYSTOLIC BLOOD PRESSURE: 131 MMHG | OXYGEN SATURATION: 96 % | HEART RATE: 64 BPM

## 2021-03-08 DIAGNOSIS — R07.89 OTHER CHEST PAIN: ICD-10-CM

## 2021-03-08 DIAGNOSIS — E89.0 POSTABLATIVE HYPOTHYROIDISM: Primary | ICD-10-CM

## 2021-03-08 LAB
ALBUMIN SERPL-MCNC: 3.7 G/DL (ref 3.4–5)
ALP SERPL-CCNC: 51 U/L (ref 40–150)
ALT SERPL W P-5'-P-CCNC: 29 U/L (ref 0–70)
AST SERPL W P-5'-P-CCNC: 18 U/L (ref 0–45)
BILIRUB DIRECT SERPL-MCNC: 0.2 MG/DL (ref 0–0.2)
BILIRUB SERPL-MCNC: 0.8 MG/DL (ref 0.2–1.3)
CHOLEST SERPL-MCNC: 157 MG/DL
CHOLEST SERPL-MCNC: 163 MG/DL
CV STRESS MAX HR HE: 144
HDLC SERPL-MCNC: 36 MG/DL
HDLC SERPL-MCNC: 36 MG/DL
LDLC SERPL CALC-MCNC: 100 MG/DL
LDLC SERPL CALC-MCNC: 93 MG/DL
NONHDLC SERPL-MCNC: 121 MG/DL
NONHDLC SERPL-MCNC: 127 MG/DL
PROT SERPL-MCNC: 7.2 G/DL (ref 6.8–8.8)
RATE PRESSURE PRODUCT: NORMAL
STRESS ANGINA INDEX: 0
STRESS ECHO BASELINE DIASTOLIC HE: 78
STRESS ECHO BASELINE HR: 58
STRESS ECHO BASELINE SYSTOLIC BP: 126
STRESS ECHO CALCULATED PERCENT HR: 93 %
STRESS ECHO LAST STRESS DIASTOLIC BP: 82
STRESS ECHO LAST STRESS SYSTOLIC BP: 156
STRESS ECHO POST ESTIMATED WORKLOAD: 7 METS
STRESS ECHO POST EXERCISE DUR MIN: 6 MIN
STRESS ECHO POST EXERCISE DUR SEC: 0 SEC
STRESS ECHO TARGET HR: 155
T4 FREE SERPL-MCNC: 1.76 NG/DL (ref 0.76–1.46)
TRIGL SERPL-MCNC: 134 MG/DL
TRIGL SERPL-MCNC: 140 MG/DL
TROPONIN I SERPL-MCNC: <0.015 UG/L (ref 0–0.04)
TROPONIN I SERPL-MCNC: <0.015 UG/L (ref 0–0.04)
TSH SERPL DL<=0.005 MIU/L-ACNC: 0.23 MU/L (ref 0.4–4)
TSH SERPL DL<=0.005 MIU/L-ACNC: 0.34 MU/L (ref 0.4–4)

## 2021-03-08 PROCEDURE — 99204 OFFICE O/P NEW MOD 45 MIN: CPT | Performed by: INTERNAL MEDICINE

## 2021-03-08 PROCEDURE — A9502 TC99M TETROFOSMIN: HCPCS | Performed by: HOSPITALIST

## 2021-03-08 PROCEDURE — 93016 CV STRESS TEST SUPVJ ONLY: CPT | Performed by: INTERNAL MEDICINE

## 2021-03-08 PROCEDURE — 84439 ASSAY OF FREE THYROXINE: CPT | Performed by: INTERNAL MEDICINE

## 2021-03-08 PROCEDURE — 36415 COLL VENOUS BLD VENIPUNCTURE: CPT | Performed by: INTERNAL MEDICINE

## 2021-03-08 PROCEDURE — 78452 HT MUSCLE IMAGE SPECT MULT: CPT

## 2021-03-08 PROCEDURE — 80061 LIPID PANEL: CPT | Performed by: INTERNAL MEDICINE

## 2021-03-08 PROCEDURE — 99236 HOSP IP/OBS SAME DATE HI 85: CPT | Performed by: INTERNAL MEDICINE

## 2021-03-08 PROCEDURE — 84484 ASSAY OF TROPONIN QUANT: CPT | Performed by: INTERNAL MEDICINE

## 2021-03-08 PROCEDURE — 250N000013 HC RX MED GY IP 250 OP 250 PS 637: Performed by: INTERNAL MEDICINE

## 2021-03-08 PROCEDURE — 80076 HEPATIC FUNCTION PANEL: CPT | Performed by: INTERNAL MEDICINE

## 2021-03-08 PROCEDURE — 84443 ASSAY THYROID STIM HORMONE: CPT | Performed by: INTERNAL MEDICINE

## 2021-03-08 PROCEDURE — 93017 CV STRESS TEST TRACING ONLY: CPT

## 2021-03-08 PROCEDURE — G0378 HOSPITAL OBSERVATION PER HR: HCPCS

## 2021-03-08 PROCEDURE — 78452 HT MUSCLE IMAGE SPECT MULT: CPT | Mod: 26 | Performed by: INTERNAL MEDICINE

## 2021-03-08 PROCEDURE — 99207 PR APP CREDIT; MD BILLING SHARED VISIT: CPT | Performed by: INTERNAL MEDICINE

## 2021-03-08 PROCEDURE — 93018 CV STRESS TEST I&R ONLY: CPT | Performed by: INTERNAL MEDICINE

## 2021-03-08 PROCEDURE — 343N000001 HC RX 343: Performed by: HOSPITALIST

## 2021-03-08 RX ORDER — NITROGLYCERIN 0.4 MG/1
TABLET SUBLINGUAL
Qty: 30 TABLET | Refills: 1 | Status: SHIPPED | OUTPATIENT
Start: 2021-03-08 | End: 2021-06-09

## 2021-03-08 RX ORDER — MAGNESIUM HYDROXIDE/ALUMINUM HYDROXICE/SIMETHICONE 120; 1200; 1200 MG/30ML; MG/30ML; MG/30ML
30 SUSPENSION ORAL EVERY 4 HOURS PRN
Status: DISCONTINUED | OUTPATIENT
Start: 2021-03-08 | End: 2021-03-08 | Stop reason: HOSPADM

## 2021-03-08 RX ORDER — MESALAMINE 500 MG/1
2000 CAPSULE, EXTENDED RELEASE ORAL 2 TIMES DAILY
COMMUNITY
End: 2023-11-15 | Stop reason: ALTCHOICE

## 2021-03-08 RX ORDER — ASPIRIN 81 MG/1
81 TABLET ORAL DAILY
Status: DISCONTINUED | OUTPATIENT
Start: 2021-03-08 | End: 2021-03-08 | Stop reason: HOSPADM

## 2021-03-08 RX ORDER — ATORVASTATIN CALCIUM 40 MG/1
40 TABLET, FILM COATED ORAL EVERY EVENING
Qty: 30 TABLET | Refills: 1 | Status: SHIPPED | OUTPATIENT
Start: 2021-03-08 | End: 2021-03-09

## 2021-03-08 RX ORDER — LIDOCAINE 40 MG/G
CREAM TOPICAL
Status: DISCONTINUED | OUTPATIENT
Start: 2021-03-08 | End: 2021-03-08 | Stop reason: HOSPADM

## 2021-03-08 RX ORDER — NITROGLYCERIN 0.4 MG/1
0.4 TABLET SUBLINGUAL EVERY 5 MIN PRN
Status: DISCONTINUED | OUTPATIENT
Start: 2021-03-08 | End: 2021-03-08 | Stop reason: HOSPADM

## 2021-03-08 RX ORDER — ATORVASTATIN CALCIUM 40 MG/1
40 TABLET, FILM COATED ORAL EVERY EVENING
Status: DISCONTINUED | OUTPATIENT
Start: 2021-03-08 | End: 2021-03-08 | Stop reason: HOSPADM

## 2021-03-08 RX ORDER — FEXOFENADINE HCL 180 MG/1
180 TABLET ORAL DAILY PRN
COMMUNITY
End: 2023-11-15

## 2021-03-08 RX ORDER — LEVOTHYROXINE SODIUM 175 UG/1
175 TABLET ORAL DAILY
Qty: 30 TABLET | Refills: 0 | Status: SHIPPED | OUTPATIENT
Start: 2021-03-09 | End: 2021-03-09

## 2021-03-08 RX ORDER — ACETAMINOPHEN 650 MG/1
650 SUPPOSITORY RECTAL EVERY 4 HOURS PRN
Status: DISCONTINUED | OUTPATIENT
Start: 2021-03-08 | End: 2021-03-08 | Stop reason: HOSPADM

## 2021-03-08 RX ORDER — ACETAMINOPHEN 325 MG/1
650 TABLET ORAL EVERY 4 HOURS PRN
Status: DISCONTINUED | OUTPATIENT
Start: 2021-03-08 | End: 2021-03-08 | Stop reason: HOSPADM

## 2021-03-08 RX ADMIN — ASPIRIN 81 MG: 81 TABLET, DELAYED RELEASE ORAL at 09:04

## 2021-03-08 RX ADMIN — TETROFOSMIN 12.2 MCI.: 1.38 INJECTION, POWDER, LYOPHILIZED, FOR SOLUTION INTRAVENOUS at 08:33

## 2021-03-08 RX ADMIN — MESALAMINE 1000 MG: 250 CAPSULE ORAL at 09:03

## 2021-03-08 RX ADMIN — TETROFOSMIN 37.8 MCI.: 1.38 INJECTION, POWDER, LYOPHILIZED, FOR SOLUTION INTRAVENOUS at 11:01

## 2021-03-08 RX ADMIN — OMEPRAZOLE 20 MG: 20 CAPSULE, DELAYED RELEASE ORAL at 06:46

## 2021-03-08 RX ADMIN — LEVOTHYROXINE SODIUM 200 MCG: 112 TABLET ORAL at 09:04

## 2021-03-08 ASSESSMENT — MIFFLIN-ST. JEOR: SCORE: 1962.44

## 2021-03-08 NOTE — PROGRESS NOTES
Patient admitted today morning by my colleague, he denies any active chest pain, he went for the nuclear stress test, area of mild very ischemic changes noted in with nuclear stress test, will request cardiology consult, his TSH is quite low at 0.23 while his T4 levels are elevated, reduced his Synthroid dose to 175 mcg and repeat labs again in 4 weeks.

## 2021-03-08 NOTE — PLAN OF CARE
Received the patient from [facility] ED @ 4496. Covid negative (Reports are in the patient file). No Home medications.   A&Ox4. VSS on RA Except HTN. Tele SR. Clear liquid diet. Denied pain/SOB/Nausea. PIV SL. ECHO to be done. Continue to monitor.       Observation goals PRIOR TO DISCHARGE    Comments: List all goals to be met before discharge home:   - Serial troponins and stress test complete-Partially met  - Seen and cleared by consultant if applicable -Not met  - Adequate pain control on oral analgesia-MET   - Vital signs normal or at patient baseline-Partially met  - Safe disposition plan has been identified -Not met  - Nurse to notify provider when observation goals have been met and patient is ready for discharge.

## 2021-03-08 NOTE — PROVIDER NOTIFICATION
MD Notification    Notified Person: MD    Notified Person Name:Ken    Notification Date/Time:3/8/21@0326    Notification Interaction:paged    Purpose of Notification:Pt is the direct admit from [facility], he just arrived here, needs to be seen and get orders    Orders Received:    Comments:

## 2021-03-08 NOTE — PHARMACY-ADMISSION MEDICATION HISTORY
Pharmacy Medication History  Admission medication history interview status for the 3/8/2021  admission is complete. See EPIC admission navigator for prior to admission medications     Location of Interview: Phone  Medication history sources: Patient and Care Everywhere    In the past week, patient estimated taking medication this percent of the time: greater than 90%    Additional medication history information:   none    Medication reconciliation completed by provider prior to medication history? Yes    Time spent in this activity: 20 minutes    Prior to Admission medications    Medication Sig Last Dose Taking? Auth Provider   fexofenadine (ALLEGRA) 180 MG tablet Take 180 mg by mouth daily as needed for allergies as needed Yes Unknown, Entered By History   levothyroxine (SYNTHROID/LEVOTHROID) 200 MCG tablet Take 1 tablet (200 mcg) by mouth daily 3/7/2021 at Unknown time Yes Vinod Chris MD   lisinopril (ZESTRIL) 10 MG tablet Take 1 tablet (10 mg) by mouth daily 3/7/2021 at Unknown time Yes Vinod Chris MD   mesalamine ER (PENTASA) 500 MG CR capsule Take 2,000 mg by mouth 2 times daily 3/7/2021 at Unknown time Yes Unknown, Entered By History   omeprazole (PRILOSEC) 20 MG DR capsule Take 1 capsule (20 mg) by mouth daily as needed as needed Yes Vinod Chris MD       The information provided in this note is only as accurate as the sources available at the time of update(s)

## 2021-03-08 NOTE — PROGRESS NOTES
Pt is A&Ox4, VSS on RA, Reg diet, Ind in room, IV SL, Reports mild chest discomfort, denies back, shoulder or headache pain, N/V, lightheadedness, Stress test completed, Baseline UE neuromuscular disorder, possible discharge today, family member at bedside, continue to monitor

## 2021-03-08 NOTE — PROGRESS NOTES
[facility]  Cardiology Consultation     Date of Admission:  3/8/2021  Primary Cardiologist: New to cardiology  Date of Consult (When I saw the patient): 03/08/21  Reason for consult: Chest tightness    History of Present Illness   Manuel Schofield is a 65 year old male who has a past medical history significant for GERD, HLP, tobacco use (quit 1 year ago), family history of CAD (mother had MI in her 70s), hypothyroidism, Crohn's.     The patient was transferred from [facility] after he presented for chest discomfort. His symptoms started 2 days ago when he awoke sitting in a chair with diffuse chest tightness.     Assessment:  1. Atypical troponin negative chest pain    CT aortogram at [facility] noted no dissection, positive for coronary artery calcifications and no PE.    Troponin negative x 2    Stress test (3/8): probably negative for inducible ischemia or infarction. Small area of mild ischemia in the mid to distal inferior segment(s) of the left ventricle.  The accuracy of this finding is likely reduced due to presence of diaphragmatic attenuation.    2. Hyperlipidemia    FLP (2/2020): , HDL 38, trigs 150-not on PTA statin    FLP (3/8/2021): , HDL 36, , Trigs 134    Started on atorvastatin 40 mg daily    3. Hypertension    [PTA: lisinopril 10 mg daily]    4. GERD    prn omeprazole    5. Crohn's    Follows with GI    Plan:   1. Recommend close outpatient cardiology follow up at [facility]. Patient live in [place] to evaluate if need for further ischemic work up. (Care coordinator please assist in scheduling).   2. Discharge on ASA, statin and SL nitroglycerin  --------------------------------------------------------------------------------------------  MARCOS EARLY CNP  Pager:  [phone]   (7am - 5pm, M-F)    Code Status    Full Code    Past Medical History   I have reviewed this patient's medical history and updated it with  pertinent information if needed.   Past Medical History:   Diagnosis Date     Chest pain, unspecified 10/01/2004    adenosine cardiolyte:ECG negative, no ischemia, EF 59%     Crohn's disease (H)      Esophageal reflux     EGD 2005 normal     Hereditary progressive muscular dystrophy (H)      HTN (hypertension)      Hypothyroidism      Internal hemorrhoids without mention of complication      Unspecified tinnitus     chronic, ENT evaluation       Past Surgical History   I have reviewed this patient's surgical history and updated it with pertinent information if needed.  Past Surgical History:   Procedure Laterality Date     APPENDECTOMY       CHOLECYSTECTOMY       COLONOSCOPY  7/8/2013    Colonoscopy Dr. Pruitt [facility]     COLONOSCOPY  7/8/2013    Procedure: COLONOSCOPY;  Colonoscopy ;  Surgeon: Vinod Pruitt MD;  Location:  GI     ENT SURGERY      T&A as a child     HC THYROID LOBECTOMY,UNILAT  1990     HC THYROID LOBECTOMY,UNILAT  2008    resection goiter       Prior to Admission Medications   Prior to Admission Medications   Prescriptions Last Dose Informant Patient Reported? Taking?   fexofenadine (ALLEGRA) 180 MG tablet as needed Self Yes Yes   Sig: Take 180 mg by mouth daily as needed for allergies   levothyroxine (SYNTHROID/LEVOTHROID) 200 MCG tablet 3/7/2021 at Unknown time Self No No   Sig: Take 1 tablet (200 mcg) by mouth daily   lisinopril (ZESTRIL) 10 MG tablet 3/7/2021 at Unknown time Self No Yes   Sig: Take 1 tablet (10 mg) by mouth daily   mesalamine ER (PENTASA) 500 MG CR capsule 3/7/2021 at Unknown time Self Yes Yes   Sig: Take 2,000 mg by mouth 2 times daily   omeprazole (PRILOSEC) 20 MG DR capsule as needed Self No Yes   Sig: Take 1 capsule (20 mg) by mouth daily as needed      Facility-Administered Medications Last Administration Doses Remaining   methylPREDNISolone (DEPO-MEDROL) injection 40 mg 6/22/2020 10:49 AM    ropivacaine (NAROPIN) injection 3 mL 6/22/2020 10:49 AM         Allergies    Allergies   Allergen Reactions     Peanut-Derived      Seasonal Allergies        Social History   I have reviewed this patient's social history and updated it with pertinent information if needed. Manuel Schofield  reports that he quit smoking about 12 months ago. His smoking use included cigarettes. He has a 40.00 pack-year smoking history. He has never used smokeless tobacco. He reports current alcohol use. He reports that he does not use drugs.    Family History   I have reviewed this patient's family history and updated it with pertinent information if needed.   Family History   Problem Relation Age of Onset     Heart Disease Mother      Cancer - colorectal Mother      Alzheimer Disease Father      Neurologic Disorder Father      Gastrointestinal Disease Brother         colon polyps     Diabetes Sister      Diabetes Brother        --------------------------------------------------------------------------------------------    Review of Systems   The 10 point Review of Systems is negative other than noted in the HPI.     Temp:  [95.8  F (35.4  C)-96.5  F (35.8  C)] 96.5  F (35.8  C)  Pulse:  [56-64] 64  Resp:  [18] 18  BP: (131-150)/(64-71) 131/64  SpO2:  [95 %-96 %] 96 %  258 lbs 3.2 oz    Constitutional:   NAD   Skin:   Warm and dry   Head:   Nontraumatic   Neck:   no JVD   Lungs:   symmetric, clear to auscultation   Cardiovascular:   regular rate and rhythm and normal S1 and S2   Abdomen:   Benign   Extremities and Back:   No edema   Neurological:   Grossly nonfocal   Data   Recent Labs   Lab Test 08/30/17  0731 04/27/16  0734   WBC 8.7 8.6   HGB 15.4 15.3   MCV 87 86    272      Recent Labs   Lab Test 09/22/20  1044 09/19/19  0919    138   POTASSIUM 3.7 3.9   CHLORIDE 106 108   BUN 11 10   CR 0.79 0.85     No results for input(s): GLC in the last 168 hours.  Recent Labs   Lab Test 03/08/21  0353 09/22/20  1044   ALT 29 30   AST 18 21     Troponin I ES   Date Value Ref Range Status   03/08/2021  <0.015 0.000 - 0.045 ug/L Final     Comment:     The 99th percentile for upper reference range is 0.045 ug/L.  Troponin values   in the range of 0.045 - 0.120 ug/L may be associated with risks of adverse   clinical events.     03/08/2021 <0.015 0.000 - 0.045 ug/L Final     Comment:     The 99th percentile for upper reference range is 0.045 ug/L.  Troponin values   in the range of 0.045 - 0.120 ug/L may be associated with risks of adverse   clinical events.         No lab results found.    Lab Results   Component Value Date    CHOL 157 03/08/2021     Lab Results   Component Value Date    HDL 36 03/08/2021     Lab Results   Component Value Date    LDL 93 03/08/2021     Lab Results   Component Value Date    TRIG 140 03/08/2021     Lab Results   Component Value Date    CHOLHDLRATIO 4.8 04/08/2015          TSH   Date Value Ref Range Status   03/08/2021 0.23 (L) 0.40 - 4.00 mU/L Final

## 2021-03-08 NOTE — H&P
[facility]    History and Physical - Hospitalist Service       Date of Admission:  3/8/2021    Assessment & Plan   Manuel Schofield is a 65 year old male admitted on 3/8/2021. He presents as a direct admission from [facility] after he presented with chest tightness for cardiac rule out.    Atypical chest pain: Patient with chest tightness, primary suspicion is GI source rather than ACS.  History of similar symptoms.  Reports that occasionally he feels that he has abdominal bloating symptoms which impinge on his breathing and can contribute to chest tightness.  Also has a history of GERD for which he takes as needed omeprazole.  2 days of discomfort more consistent with GERD, today's pain more of a tightness across his chest both in the a.m. and more persistent this evening coronary artery calcifications noted as present on CT aortogram at outside hospital prior to transfer.  History of tobacco abuse, quit 1 year ago.  Mother with heart attack in her 70s.  -Aspirin 81 mg daily  -Complete troponin trend  -Exercise stress echocardiogram ordered, no caffeine diet in case he needs to convert to chemical stress  -If negative cardiac work-up, recommend outpatient GI evaluation as below    Postprandial bloating: Patient reports he has had postprandial bloating for years as well as intermittent diarrhea for years, though both have been worsening recently.  I actually note that a 2016 cardiac evaluation at [facility] was the result of a very similar presentation for postprandial bloating and associated chest tightness.  Last EGD that is available for me to review was in 2012, essentially normal study.  Ileal Crohn's: It is possible that patient has abdominal strictures resulting in intermittent partial obstructions, though no mention of this on report of the CT from outside hospital.  He also has a history of ruptured appendicitis many years ago which might also contribute to  strictures.  -Pentasa 1 g 3 times daily (PTA 1500 BID, I believe)  -No caffeine diet currently, advance diet following stress imaging.  -Current plan is for patient to schedule appointment with primary gastroenterologist at discharge.  This was discussed with patient on admission.  Per most recent GI note, consider trial of prednisone for possible Crohn's flare resulting in his diarrhea and abdominal bloating.  This can be considered following cardiac rule out with course of prednisone at discharge and subsequent follow-up with gastroenterology  -Consider EGD given postprandial symptoms, early satiety, and weight loss.  Patient reports he has lost 15 pounds over the past year, though also tells me that this is intentional weight loss.    Tobacco dependence in early sustained remission: Patient with a longstanding history of tobacco use disorder, quit 1 year ago.  -Continue to applaud cessation    Hyperlipidemia: September 2020 , triglycerides 150, HDL 38.  Based on patient's smoking history, hypertension, age, male sex, 21% 10-year cardiovascular risk score.  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  Involves his upper extremities and he has limited function of his hands bilaterally.       Diet: Combination Diet Regular Diet Adult; No Caffeine Diet    DVT Prophylaxis: Ambulate every shift  Perez Catheter: not present  Code Status: Full Code           Disposition Plan   Expected discharge: Today, recommended to prior living  arrangement once ACS workup/ruleout complete.  Entered: Lee Mensah MD 03/08/2021, 3:33 AM     The patient's care was discussed with the Patient and accepting physician, Dr Rogers.    Lee Mensah MD  [facility]  Contact information available via [facility] Paging/Directory      ______________________________________________________________________    Chief Complaint   Chest tightness    History is obtained from the patient, chart review, review of outside records including recent telephone communication with [place] [facility] regarding postprandial bloating and discomfort with recommendation for trial of MiraLAX, possible trial of prednisone    History of Present Illness   Manuel Schofield is a 65 year old male who presents as a direct admission for cardiac evaluation and ACS rule out from [facility].     Patient has a history of ileal Crohn's disease, follows with Dr. Pruitt of [place] [facility] and has been on Pentasa for many years.  For what patient describes as the past 2 years, he has had increased problems with episodes of diarrhea, postprandial abdominal bloating.  He tells me that both of the symptoms have worsened in the past months.  He has actually been in contact with his gastroenterologist recently who recommended a trial of MiraLAX, likely secondary to risk of strictures with his history of ileal Crohn's and prior ruptured appendicitis.  Patient tells me that he tried this, he thought it made his bloating slightly worse, subsequently developed diarrhea recurrence and discontinued MiraLAX.  He tells me when he is not having diarrhea, he will occasionally have difficulty having a bowel movement.  If he goes more than 1 day without a bowel movement, he will tend to take a laxative for fear of obstruction, though he has never had an obstruction to his knowledge.  Has had CTs of the abdomen in the past not demonstrating obstructive pathology.   Note that during his most recent telephone encounter with his gastroenterologist, there was consideration for a trial of prednisone if MiraLAX was not palliative, and if patient improved with this, possible escalation of his Crohn's treatment.    1 of patient's major concerns, as well is what appears to be one of his wife's major concerns based on patient's report, is his postprandial bloating.  Patient tells me that whenever he eats, specially over the past months, he has abdominal bloating.  He feels that this actually can impinge on his breathing and give him some chest discomfort.  He also tells me that for the past weeks he has had worsened acid reflux symptoms.  His current presentation for chest pain actually started with central acid reflux sensation for 2 days, though when he awoke 3/7/2021 and was sitting in his chair, he noted a more diffuse tightness across his chest which was different.  As he was up and moving for the day, he tells me that this got better and nearly resolved, though it recurred again in the evening, and as he was nervous about it, his wife encouraged him to be evaluated.  This led to evaluation at [facility] emergency department where a CT aortogram was performed demonstrating no dissection, positive presence of coronary artery calcifications not further described, no pulmonary embolism.  Initial troponin was undetectable.  Secondary to bed need, patient was transferred to [facility] to complete cardiac rule out.    Patient symptoms seem more consistent with GI etiology.  He actually has had cardiac evaluations in the past.  I note a 2012 stress test as well as most recently a 2016 study through [facility].  His 2016 nuclear medicine stress study occurred in a very similar situation to tonight's presentation where patient was complaining of abdominal bloating after eating, frequent diarrhea.  No inducible stress at that time or on prior stress imaging  studies.    Patient does have some cardiac risk factors including a family history of coronary artery disease in his mother, though her heart disease was in her 70s, hypertension, hyperlipidemia not on statin therapy, history of smoking with recent sustained cessation.  Patient tells me that he quit smoking 1 year ago.  No nausea or vomiting    No nausea or vomiting, though again, feels that his abdominal bloating occasionally result in some shortness of breath.  Has had a 15 pound weight loss over the past year which he reports is intentional.       Review of Systems    The 10 point Review of Systems is negative other than noted in the HPI or here.   No fevers  Felt chilled for several minutes earlier yesterday, resolved spontaneously, no rigors  +Diarrhea  +shortness of breath w/ abdominal bloating/chest tightness    Past Medical History    I have reviewed this patient's medical history and updated it with pertinent information if needed.   Past Medical History:   Diagnosis Date     Chest pain, unspecified 10/01/2004    adenosine cardiolyte:ECG negative, no ischemia, EF 59%     Crohn's disease (H)      Esophageal reflux     EGD 2005 normal     Hereditary progressive muscular dystrophy (H)      HTN (hypertension)      Hypothyroidism      Internal hemorrhoids without mention of complication      Unspecified tinnitus     chronic, ENT evaluation       Past Surgical History   I have reviewed this patient's surgical history and updated it with pertinent information if needed.  Past Surgical History:   Procedure Laterality Date     APPENDECTOMY       CHOLECYSTECTOMY       COLONOSCOPY  7/8/2013    Colonoscopy Dr. Pruitt [facility]     COLONOSCOPY  7/8/2013    Procedure: COLONOSCOPY;  Colonoscopy ;  Surgeon: Vinod Pruitt MD;  Location:  GI     ENT SURGERY      T&A as a child     HC THYROID LOBECTOMY,UNILAT  1990     HC THYROID LOBECTOMY,UNILAT  2008    resection goiter       Social History   I have reviewed this  patient's social history and updated it with pertinent information if needed.  Social History     Tobacco Use     Smoking status: Former Smoker     Packs/day: 1.00     Years: 40.00     Pack years: 40.00     Types: Cigarettes     Quit date: 2020     Years since quittin.0     Smokeless tobacco: Never Used   Substance Use Topics     Alcohol use: Yes     Alcohol/week: 0.0 standard drinks     Frequency: Monthly or less     Drinks per session: 1 or 2     Binge frequency: Never     Comment: rare     Drug use: No       Family History   I have reviewed this patient's family history and updated it with pertinent information if needed.  Family History   Problem Relation Age of Onset     Heart Disease Mother      Cancer - colorectal Mother      Alzheimer Disease Father      Neurologic Disorder Father      Gastrointestinal Disease Brother         colon polyps     Diabetes Sister      Diabetes Brother        Prior to Admission Medications   Prior to Admission Medications   Prescriptions Last Dose Informant Patient Reported? Taking?   Fexofenadine HCl (ALLEGRA PO)   Yes No   levothyroxine (SYNTHROID/LEVOTHROID) 200 MCG tablet   No No   Sig: Take 1 tablet (200 mcg) by mouth daily   lisinopril (ZESTRIL) 10 MG tablet   No No   Sig: Take 1 tablet (10 mg) by mouth daily   mesalamine (PENTASA) 500 MG CPCR CR capsule   No No   Sig: Take 3 capsules (1,500 mg) by mouth 2 times daily   omeprazole (PRILOSEC) 20 MG DR capsule   No No   Sig: Take 1 capsule (20 mg) by mouth daily as needed      Facility-Administered Medications Last Administration Doses Remaining   methylPREDNISolone (DEPO-MEDROL) injection 40 mg 2020 10:49 AM    ropivacaine (NAROPIN) injection 3 mL 2020 10:49 AM         Allergies   Allergies   Allergen Reactions     Peanut-Derived      Seasonal Allergies        Physical Exam   Vital Signs: Temp: 95.8  F (35.4  C) Temp src: Axillary BP: (!) 150/71 Pulse: 56   Resp: 18 SpO2: 95 % O2 Device: None (Room air)     Weight: 258 lbs 3.2 oz    General Appearance: Obese, though well-appearing 65-year-old male in no distress.  Eyes: No scleral icterus or injection.  Corneas arcus senilis present  HEENT: Normocephalic, atraumatic  Respiratory: Breath sounds are clear bilateral auscultation without wheezes or crackles  Cardiovascular: Heart sounds are distant.  Regular rate and rhythm.  Heart rate currently 60  GI: Abdomen obese, soft, nontender to palpation.  Midline ventral hernia not containing bowel, though notable when patient sits forward.  Bowel sounds are present and normal active  Lymph/Hematologic: No lower extremity edema  Skin: Telangiectasias of cheeks consistent with known rosacea diagnosis.  Lior complexion which patient reports is normal for him and unchanged.  Lipoma versus more likely cyst of left lateral arm which is longstanding per patient report.  Mild bursitis of left elbow, right distal ulna callus formation and induration from chronic trauma  Musculoskeletal: Patient with distal muscular wasting of upper extremities.  Does not have function of hands completely related to motor neuron disease  Neurologic: Alert, conversant, appropriate in conversation.  Mental status grossly intact.  Unable to  with his hands secondary to motor neuron disease.  Psychiatric: Very pleasant, normal affect    Data   Data reviewed today: I reviewed all medications, new labs and imaging results over the last 24 hours. I personally reviewed the EKG tracing showing Sinus bradycardia.    Recent Labs   Lab 03/08/21  0353   ALBUMIN 3.7   PROTTOTAL 7.2   BILITOTAL 0.8   ALKPHOS 51   ALT 29   AST 18   TROPI <0.015   From outside labs, sodium 139, potassium 3.7, BUN 7, creatinine 0.8

## 2021-03-08 NOTE — DISCHARGE SUMMARY
[facility]    Discharge Summary  Hospitalist    Date of Admission:  3/8/2021  Date of Discharge:  3/8/2021  5:06 PM  Discharging Provider: Leatha Chávez MD    Discharge Diagnoses   Chest pain  History of Present Illness   Please review admission history and physical.    Hospital Course   Manuel Schofield was admitted on 3/8/2021.  The following problems were addressed during his hospitalization:  Following admission patient did not have any further chest pain, his troponins remained stable, he underwent a nuclear stress test, there was possible mild inferior ischemia noted in the stress test for which cardiology was consulted, they evaluated the patient and recommended outpatient follow-up with [facility] [place] [facility], patient will set up appointment there in 1 to 2 weeks, in case he develops further chest pain he will report to the emergency department.  He was started on statin and aspirin along with the nitroglycerin tablets, his TSH was quite low at 0.235 T4 was 1.76, he was getting 200 mcg of Synthroid, the dose reduced to 175 mcg upon discharge with a repeat evaluation in 4 weeks, he will only need a TSH checked at the time, he will also need his cholesterol panel done in 4 weeks.  Patient to follow-up with his primary care to discuss this further.  His CPK level be checked in 4 weeks would be beneficial.  This was discussed with the patient prior to discharge.        Leatha Chávez MD,     Significant Results and Procedures     Pending Results     Unresulted Labs Ordered in the Past 30 Days of this Admission     No orders found from 2/6/2021 to 3/9/2021.           Code Status   Full Code       Primary Care Physician   Vinod Chris MD    Physical Exam   Temp: 96.5  F (35.8  C) Temp src: Oral BP: 131/64 Pulse: 64   Resp: 18 SpO2: 96 % O2 Device: None (Room air)    Vitals:    03/08/21 0323   Weight: 117.1 kg (258 lb 3.2 oz)     Vital Signs with Ranges  Temp:  [95.8  F (35.4  C)-96.5   F (35.8  C)] 96.5  F (35.8  C)  Pulse:  [56-64] 64  Resp:  [18] 18  BP: (131-150)/(64-71) 131/64  SpO2:  [95 %-96 %] 96 %  No intake/output data recorded.    The patient was examined on the day of discharge.    Discharge Disposition   Discharged to home  Condition at discharge: Stable    Consultations This Hospital Stay   CARDIOLOGY IP CONSULT    Time Spent on this Encounter   I, Leatha Chávez MD, personally saw the patient today and spent less than or equal to 30 minutes discharging this patient.    Discharge Orders      Reason for your hospital stay    Chest pain     Activity    Your activity upon discharge: activity as tolerated     Follow-up and recommended labs and tests     Follow up with primary care provider, Vinod Chris MD, within 7 days to evaluate medication change and for hospital follow- up.  The following labs/tests are recommended: TSH in 4 weeks . Synthroid dose reduced due to low TSH  .  Please set up follow-up at [place] [facility] in 2 weeks.     Discharge Instructions    If ongoing central chest pain present following further evaluation by cardiology team please discuss with your gastroenterologist.  Prior to your next cardiology follow-up if you develop chest pain/chest tightness which is not relieved by 2 nitro tablets please report to the emergency department.     Diet    Follow this diet upon discharge: Orders Placed This Encounter      Regular Diet Adult     Discharge Medications   Current Discharge Medication List      START taking these medications    Details   aspirin (ASA) 81 MG EC tablet Take 1 tablet (81 mg) by mouth daily  Qty: 30 tablet, Refills: 1    Associated Diagnoses: Other chest pain      atorvastatin (LIPITOR) 40 MG tablet Take 1 tablet (40 mg) by mouth every evening  Qty: 30 tablet, Refills: 1    Associated Diagnoses: Other chest pain      nitroGLYcerin (NITROSTAT) 0.4 MG sublingual tablet For chest pain place 1 tablet under the tongue every 5 minutes for 3 doses.  If symptoms persist 5 minutes after 1st dose call 911.  Qty: 30 tablet, Refills: 1    Associated Diagnoses: Other chest pain         CONTINUE these medications which have CHANGED    Details   levothyroxine (SYNTHROID/LEVOTHROID) 175 MCG tablet Take 1 tablet (175 mcg) by mouth daily  Qty: 30 tablet, Refills: 0    Associated Diagnoses: Postablative hypothyroidism         CONTINUE these medications which have NOT CHANGED    Details   fexofenadine (ALLEGRA) 180 MG tablet Take 180 mg by mouth daily as needed for allergies      lisinopril (ZESTRIL) 10 MG tablet Take 1 tablet (10 mg) by mouth daily  Qty: 90 tablet, Refills: 3    Associated Diagnoses: Essential hypertension      mesalamine ER (PENTASA) 500 MG CR capsule Take 2,000 mg by mouth 2 times daily      omeprazole (PRILOSEC) 20 MG DR capsule Take 1 capsule (20 mg) by mouth daily as needed  Qty: 90 capsule, Refills: 3    Associated Diagnoses: Gastroesophageal reflux disease without esophagitis           Allergies   Allergies   Allergen Reactions     Peanut-Derived      Seasonal Allergies      Data   Most Recent 3 CBC's:  Recent Labs   Lab Test 08/30/17  0731 04/27/16  0734 04/08/15  0802   WBC 8.7 8.6 8.9   HGB 15.4 15.3 15.4   MCV 87 86 88    272 237      Most Recent 3 BMP's:  Recent Labs   Lab Test 09/22/20  1044 09/19/19  0919 09/19/18  0825    138 137   POTASSIUM 3.7 3.9 4.2   CHLORIDE 106 108 104   CO2 24 24 24   BUN 11 10 12   CR 0.79 0.85 0.81   ANIONGAP 6 6 9   BISHNU 8.7 8.5 8.3*   GLC 98 90 102*     Most Recent 2 LFT's:  Recent Labs   Lab Test 03/08/21  0353 09/22/20  1044   AST 18 21   ALT 29 30   ALKPHOS 51 58   BILITOTAL 0.8 0.7     Most Recent INR's and Anticoagulation Dosing History:  Anticoagulation Dose History     There is no flowsheet data to display.        Most Recent 3 Troponin's:  Recent Labs   Lab Test 03/08/21  0732 03/08/21  0353   TROPI <0.015 <0.015     Most Recent Cholesterol Panel:  Recent Labs   Lab Test 03/08/21  0732   CHOL  163   *   HDL 36*   TRIG 134     Most Recent 6 Bacteria Isolates From Any Culture (See EPIC Reports for Culture Details):No lab results found.  Most Recent TSH, T4 and A1c Labs:  Recent Labs   Lab Test 03/08/21  0732 03/08/21  0353   TSH 0.23* 0.34*   T4  --  1.76*     Results for orders placed or performed during the hospital encounter of 03/08/21   NM Exercise stress test (nuc card)     Value    Target     Baseline Systolic     Baseline Diastolic BP 78    Last Stress Systolic     Last Stress Diastolic BP 82    Baseline HR 58    Max     Calculated Percent HR 93    Exercise duration (min) 6    Exercise duration (sec) 0    Estimated workload 7.0    Rate Pressure Product 22,464.0    Angina Index 0    Narrative       The nuclear stress test is probably negative for inducible myocardial   ischemia or infarction.     There is a small area of mild ischemia in the mid to distal inferior   segment(s) of the left ventricle.  The accuracy of this finding is likely   reduced due to presence of diaphragmatic attenuation.     Left ventricular function is normal.     There is no prior study for comparison.

## 2021-03-08 NOTE — PROGRESS NOTES
MD Notification    Notified Person: MD    Notified Person Name: Brad Gonzalez    Notification Date/Time: 3/8/21 @ 4:21 PM    Notification Interaction: Page    Purpose of Notification: FYI, family would like to speak to you before patient discharges. Thanks!    Orders Received:    Comments:

## 2021-03-09 ENCOUNTER — TELEPHONE (OUTPATIENT)
Dept: PEDIATRICS | Facility: CLINIC | Age: 66
End: 2021-03-09

## 2021-03-09 ENCOUNTER — OFFICE VISIT (OUTPATIENT)
Dept: PEDIATRICS | Facility: CLINIC | Age: 66
End: 2021-03-09
Payer: COMMERCIAL

## 2021-03-09 VITALS
TEMPERATURE: 97.3 F | SYSTOLIC BLOOD PRESSURE: 128 MMHG | OXYGEN SATURATION: 97 % | BODY MASS INDEX: 37.31 KG/M2 | DIASTOLIC BLOOD PRESSURE: 72 MMHG | HEART RATE: 62 BPM | WEIGHT: 260 LBS

## 2021-03-09 DIAGNOSIS — E89.0 POSTABLATIVE HYPOTHYROIDISM: ICD-10-CM

## 2021-03-09 DIAGNOSIS — Z13.6 CARDIOVASCULAR SCREENING; LDL GOAL LESS THAN 130: ICD-10-CM

## 2021-03-09 DIAGNOSIS — R07.89 OTHER CHEST PAIN: Primary | ICD-10-CM

## 2021-03-09 DIAGNOSIS — K21.9 GASTROESOPHAGEAL REFLUX DISEASE WITHOUT ESOPHAGITIS: ICD-10-CM

## 2021-03-09 PROCEDURE — 99214 OFFICE O/P EST MOD 30 MIN: CPT | Performed by: INTERNAL MEDICINE

## 2021-03-09 RX ORDER — LEVOTHYROXINE SODIUM 175 UG/1
175 TABLET ORAL DAILY
Qty: 90 TABLET | Refills: 1 | Status: SHIPPED | OUTPATIENT
Start: 2021-03-09 | End: 2021-04-11

## 2021-03-09 RX ORDER — ATORVASTATIN CALCIUM 40 MG/1
40 TABLET, FILM COATED ORAL EVERY EVENING
Qty: 90 TABLET | Refills: 3 | Status: SHIPPED | OUTPATIENT
Start: 2021-03-09 | End: 2022-03-15

## 2021-03-09 NOTE — PROGRESS NOTES
Assessment & Plan     Other chest pain  Atypical chest pain.  Recent stress test negative for ischemia, atorvastatin, ASA added--following up in cardiology clinic  H/o GERD.  Discussed GERD precautions, start omeprazole 20 BID, follow-up 1 week by marisa  rtc 2 months for rpt FLP  - atorvastatin (LIPITOR) 40 MG tablet; Take 1 tablet (40 mg) by mouth every evening    Postablative hypothyroidism      Synthroid recently reduced to 175mcg, rtc 4 weeks for TSH  - levothyroxine (SYNTHROID/LEVOTHROID) 175 MCG tablet; Take 1 tablet (175 mcg) by mouth daily  - T4, free; Future  - **TSH with free T4 reflex FUTURE anytime; Future    Gastroesophageal reflux disease without esophagitis  As above  - omeprazole (PRILOSEC) 20 MG DR capsule; Take 1 capsule (20 mg) by mouth 2 times daily    CARDIOVASCULAR SCREENING; LDL GOAL LESS THAN 130  - Comprehensive metabolic panel; Future  - Lipid panel reflex to direct LDL Fasting; Future    Return in about 3 months (around 6/9/2021) for follow-up visit.       Vinod Chris MD  [facility]    Ray   Reggie is a 65 year old who presents for the following health issues:    [facility]     Hospital Follow-up Visit:    Hospital/Nursing Home/IP Rehab Facility: [facility]  Date of Admission: 3/7/2021  Date of Discharge: 3/8/2021  Reason(s) for Admission: Chest pain       Was your hospitalization related to COVID-19? No   Problems taking medications regularly:  None  Medication changes since discharge: None  Problems adhering to non-medication therapy:  None    Summary of hospitalization:  [facility] discharge summary reviewed    65-year-old male with a history of muscular dystrophy, hypothyroidism, GERD and Crohn's disease presents today for follow-up of recent emergency department visit and hospitalization at [facility] with regard to chest pain.  Patient presented with complaints of central chest discomfort, intermittent and often occurring at rest.  ER  course: EKG without acute ST-T wave changes, troponins obtained were negative, D-dimer was mildly elevated 0.5, negative BNP, TSH obtained was suppressed at 0.23 with elevated T4.  Chest x-ray negative.  Based on abnormal D-dimer, CT angiogram chest was completed which was negative for PE.  After admission stress test was obtained showed small region possible ischemia mid to distal inferior aspect of left ventricle versus diaphragm attenuation.  Left ventricular function normal.  Cardiology was consulted-did not recommend any additional testing /plan for outpatient follow-up in cardiology clinic.  Patient was started on atorvastatin and aspirin.  Levothyroxine dose was adjusted based on lab results.    Since discharge, patient has continued to note chest discomfort /states he has mild discomfort across his chest as he sits in clinic today.  Denies associated nausea diaphoresis or shortness of breath.  The pain is on and off and often occurs at rest.    Medical history is significant for gastroesophageal reflux and prior similar evaluations for cardiac chest pain which ultimately resolved after treatment for acid reflux.  Does have a prior history of EGD several years ago.    Patient Active Problem List   Diagnosis     Hereditary progressive muscular dystrophy (H)     Tinnitus     CARDIOVASCULAR SCREENING; LDL GOAL LESS THAN 130     Health Care Home     Advanced directives, counseling/discussion     Family history of prostate cancer     Obesity     Postoperative hypothyroidism     Gastroesophageal reflux disease without esophagitis     Crohn's disease of small intestine without complication (H)         Diagnostic Tests/Treatments reviewed.  Follow up needed: none  Other Healthcare Providers Involved in Patient s Care:         cardiology  Update since discharge: improved.     Post Discharge Medication Reconciliation: discharge medications reconciled, continue medications without change.  Plan of care communicated with  patient       Patient Active Problem List   Diagnosis     Hereditary progressive muscular dystrophy (H)     Tinnitus     CARDIOVASCULAR SCREENING; LDL GOAL LESS THAN 130     Health Care Home     Advanced directives, counseling/discussion     Family history of prostate cancer     Obesity     Postoperative hypothyroidism     Gastroesophageal reflux disease without esophagitis     Crohn's disease of small intestine without complication (H)     Current Outpatient Medications   Medication Sig Dispense Refill     aspirin (ASA) 81 MG EC tablet Take 1 tablet (81 mg) by mouth daily 30 tablet 1     atorvastatin (LIPITOR) 40 MG tablet Take 1 tablet (40 mg) by mouth every evening 90 tablet 3     fexofenadine (ALLEGRA) 180 MG tablet Take 180 mg by mouth daily as needed for allergies       levothyroxine (SYNTHROID/LEVOTHROID) 175 MCG tablet Take 1 tablet (175 mcg) by mouth daily 90 tablet 1     lisinopril (ZESTRIL) 10 MG tablet Take 1 tablet (10 mg) by mouth daily 90 tablet 3     mesalamine ER (PENTASA) 500 MG CR capsule Take 2,000 mg by mouth 2 times daily       nitroGLYcerin (NITROSTAT) 0.4 MG sublingual tablet For chest pain place 1 tablet under the tongue every 5 minutes for 3 doses. If symptoms persist 5 minutes after 1st dose call 911. 30 tablet 1     omeprazole (PRILOSEC) 20 MG DR capsule Take 1 capsule (20 mg) by mouth 2 times daily 90 capsule 0        Review of Systems   Constitutional, HEENT, cardiovascular, pulmonary, gi and gu systems are negative, except as otherwise noted.      Objective    /72 (BP Location: Right arm, Patient Position: Sitting, Cuff Size: Adult Regular)   Pulse 62   Temp 97.3  F (36.3  C) (Tympanic)   Wt 117.9 kg (260 lb)   SpO2 97%   BMI 37.31 kg/m    Body mass index is 37.31 kg/m .  Physical Exam   GENERAL: healthy, alert and no distress  NECK: no adenopathy, no asymmetry, masses, or scars and thyroid normal to palpation  RESP: lungs clear to auscultation - no rales, rhonchi or  wheezes  CV: regular rate and rhythm, normal S1 S2, no S3 or S4, no murmur, click or rub, no peripheral edema and peripheral pulses strong  ABDOMEN: soft, nontender, no hepatosplenomegaly, no masses and bowel sounds normal  MS: no gross musculoskeletal defects noted, no edema  PSYCH: mentation appears normal, affect normal/bright

## 2021-03-09 NOTE — TELEPHONE ENCOUNTER
Please contact patient for In-patient follow up.  [phone] (home) none (work)    Visit date: 030821  Diagnosis listed: Postablative Hypothyroidism  Number of visits in past 12 months: 0 ED / 0 IP

## 2021-03-09 NOTE — TELEPHONE ENCOUNTER
Called patient and scheduled with PCP today.     Yamilka Patterson, EMT at 10:30 AM on March 9, 2021  [facility]   [phone]

## 2021-03-09 NOTE — PATIENT INSTRUCTIONS
Chest pain.  ?reflux.   Start omeprazole 20 mg twice per day      email update in 1 week.  If no improvement likely have you see gastroenterology (possible scope EGD)    Thyroid.   Dose change to 175mcg daily. Need repeat labwork in 4 weeks    Cholesterol.  Continue atorvastatin and repeat fasting labwork in 8 weeks    Follow-up with cardiology in clinic next few weeks      Patient Education     Lifestyle Changes for Controlling GERD  When you have GERD, stomach acid feels as if it s backing up toward your mouth. Making lifestyle changes can often improve your symptoms. This is true if you take medicine to control your GERD or not. Talk with your healthcare provider about the following suggestions. They may help you get relief from your symptoms.       Raise your head  Reflux is more likely to happen when you re lying down flat. That's because stomach fluid can flow backward more easily. Raising the head of your bed 4 to 6 inches can help. To do this:     Slide blocks or books under the legs at the head of your bed. Or put a wedge under the mattress. Many [employer]istant Bedding can make a wedge for you. The wedge should go from your waist to the top of your head.    Don t just prop your head up on a few pillows. This increases pressure on your stomach. It can make GERD worse.  Watch your eating habits  Certain foods may increase the acid in your stomach. Or they may relax the lower esophageal sphincter. This makes GERD more likely. It s best to avoid the following if they cause you symptoms:     Coffee, tea, and carbonated drinks (with and without caffeine)    Fatty, fried, or spicy food    Mint, chocolate, onions, tomatoes, and citrus    Peppermint    Any other foods that seem to irritate your stomach or cause you pain  Relieve the pressure  Tips include the following:    Eat smaller meals, even if you have to eat more often.    Don t lie down right after you eat. Wait a few hours for your stomach to empty.    Don't wear  tight belts or tight-fitting clothes.    Lose any extra weight.  Tobacco and alcohol  Don't smoke tobacco or drink alcohol. They can make GERD symptoms worse.   [employer] last reviewed this educational content on 6/1/2019 2000-2020 [employer]. All rights reserved. This information is not intended as a substitute for professional medical care. Always follow your healthcare professional's instructions.

## 2021-03-09 NOTE — TELEPHONE ENCOUNTER
Routing to the station/TC to discuss scheduling with Dr. Chris. Central scheduling unable to find appointment in next 7 days. Please review.     Valerie Santos, RN   [facility] -- Triage Nurse

## 2021-03-09 NOTE — TELEPHONE ENCOUNTER
"Hospital/TCU/ED for chronic condition Discharge Protocol    \"Hi, my name is Valerie Santos RN, a registered nurse, and I am calling from [facility].  I am calling to follow up and see how things are going for you after your recent emergency visit/hospital/TCU stay.\"    Tell me how you are doing now that you are home?\" Spoke with the Pt. He is doing well since arriving home. Has had a few episodes of mild chest pain last under a minute, in which it resolved on its on. Has not needed to use nitroglycerine. He actually has not picked up his new medications as of yet. He will go pick them up today. Denies any SOB or difficulty breathing. Went over guidelines of how to use the Nitroglycerine. He has a visit with Cardiology on 3/24/21. Is aware of levothyroxine dosage change.         Discharge Instructions    \"Let's review your discharge instructions.  What is/are the follow-up recommendations?  Pt. Response: Follow up with PCP and Cardiology. Have TSH rechecked in 4 weeks as well as lipids.     \"Has an appointment with your primary care provider been scheduled?\"   Routing to  to address scheduling.     \"When you see the provider, I would recommend that you bring your medications with you.\"    Medications    \"Tell me what changed about your medicines when you discharged?\"    Changes to chronic meds?    0-1    \"What questions do you have about your medications?\"    None     New diagnoses of heart failure, COPD, diabetes, or MI?    No              Post Discharge Medication Reconciliation Status: discharge medications reconciled, continue medications without change.    Was MTM referral placed (*Make sure to put transitions as reason for referral)?   No    Call Summary    \"What questions or concerns do you have about your recent visit and your follow-up care?\"     none    \"If you have questions or things don't continue to improve, we encourage you contact us through the main clinic number (give number).  " "Even if the clinic is not open, triage nurses are available 24/7 to help you.     We would like you to know that our clinic has extended hours (provide information).  We also have urgent care (provide details on closest location and hours/contact info)\"      \"Thank you for your time and take care!\"    Valerie Santos RN   [facility] -- Triage Nurse                 "

## 2021-03-09 NOTE — PROGRESS NOTES
Covid negative. AxOx4. Regular diet. Independent in room. PIV taken out. Discharge instructions reviewed with patient and wife in room. Patient transferred off the unit @ 4:30 PM in a wheelchair and with belongings.

## 2021-03-16 ENCOUNTER — MYC MEDICAL ADVICE (OUTPATIENT)
Dept: PEDIATRICS | Facility: CLINIC | Age: 66
End: 2021-03-16

## 2021-03-30 ENCOUNTER — MYC MEDICAL ADVICE (OUTPATIENT)
Dept: PEDIATRICS | Facility: CLINIC | Age: 66
End: 2021-03-30

## 2021-03-30 DIAGNOSIS — K21.9 GASTROESOPHAGEAL REFLUX DISEASE WITHOUT ESOPHAGITIS: ICD-10-CM

## 2021-03-31 RX ORDER — FAMOTIDINE 40 MG/1
40 TABLET, FILM COATED ORAL 2 TIMES DAILY PRN
Qty: 180 TABLET | Refills: 3 | Status: SHIPPED | OUTPATIENT
Start: 2021-03-31 | End: 2022-03-15

## 2021-04-05 DIAGNOSIS — Z13.6 CARDIOVASCULAR SCREENING; LDL GOAL LESS THAN 130: ICD-10-CM

## 2021-04-05 DIAGNOSIS — E89.0 POSTABLATIVE HYPOTHYROIDISM: ICD-10-CM

## 2021-04-05 LAB
ALBUMIN SERPL-MCNC: 3.7 G/DL (ref 3.4–5)
ALP SERPL-CCNC: 69 U/L (ref 40–150)
ALT SERPL W P-5'-P-CCNC: 27 U/L (ref 0–70)
ANION GAP SERPL CALCULATED.3IONS-SCNC: 6 MMOL/L (ref 3–14)
AST SERPL W P-5'-P-CCNC: 18 U/L (ref 0–45)
BILIRUB SERPL-MCNC: 1 MG/DL (ref 0.2–1.3)
BUN SERPL-MCNC: 8 MG/DL (ref 7–30)
CALCIUM SERPL-MCNC: 8.7 MG/DL (ref 8.5–10.1)
CHLORIDE SERPL-SCNC: 106 MMOL/L (ref 94–109)
CHOLEST SERPL-MCNC: 102 MG/DL
CO2 SERPL-SCNC: 26 MMOL/L (ref 20–32)
CREAT SERPL-MCNC: 0.78 MG/DL (ref 0.66–1.25)
GFR SERPL CREATININE-BSD FRML MDRD: >90 ML/MIN/{1.73_M2}
GLUCOSE SERPL-MCNC: 99 MG/DL (ref 70–99)
HDLC SERPL-MCNC: 38 MG/DL
LDLC SERPL CALC-MCNC: 41 MG/DL
NONHDLC SERPL-MCNC: 63 MG/DL
POTASSIUM SERPL-SCNC: 3.8 MMOL/L (ref 3.4–5.3)
PROT SERPL-MCNC: 7.5 G/DL (ref 6.8–8.8)
SODIUM SERPL-SCNC: 138 MMOL/L (ref 133–144)
T4 FREE SERPL-MCNC: 1.62 NG/DL (ref 0.76–1.46)
TRIGL SERPL-MCNC: 110 MG/DL
TSH SERPL DL<=0.005 MIU/L-ACNC: 0.3 MU/L (ref 0.4–4)

## 2021-04-05 PROCEDURE — 84443 ASSAY THYROID STIM HORMONE: CPT | Performed by: INTERNAL MEDICINE

## 2021-04-05 PROCEDURE — 84439 ASSAY OF FREE THYROXINE: CPT | Performed by: INTERNAL MEDICINE

## 2021-04-05 PROCEDURE — 80061 LIPID PANEL: CPT | Performed by: INTERNAL MEDICINE

## 2021-04-05 PROCEDURE — 36415 COLL VENOUS BLD VENIPUNCTURE: CPT | Performed by: INTERNAL MEDICINE

## 2021-04-05 PROCEDURE — 80053 COMPREHEN METABOLIC PANEL: CPT | Performed by: INTERNAL MEDICINE

## 2021-04-11 ENCOUNTER — TELEPHONE (OUTPATIENT)
Dept: PEDIATRICS | Facility: CLINIC | Age: 66
End: 2021-04-11

## 2021-04-11 DIAGNOSIS — E89.0 POSTOPERATIVE HYPOTHYROIDISM: Primary | ICD-10-CM

## 2021-04-11 RX ORDER — LEVOTHYROXINE SODIUM 150 UG/1
150 TABLET ORAL DAILY
Qty: 90 TABLET | Refills: 0 | Status: SHIPPED | OUTPATIENT
Start: 2021-04-11 | End: 2021-06-11

## 2021-04-11 NOTE — TELEPHONE ENCOUNTER
Please call pt:  Recent results:    Lab Results   Component Value Date    TSH 0.30 04/05/2021     Lab Results   Component Value Date    T4 1.62 04/05/2021     1 month ago, levothyroxine dose was reduced from 200mcg to 175 mcg due to low TSH and elevated T4.  Most recent follow-up lab work: TSH continues to be suppressed and T4 has improved but still elevated.    Current levothyroxine dose: 175 mcg daily    Dosing needs adjustment.  Please instruct pt to start new dose:   levothyroxine 150 mcg daily  Pt should return for repeat labwork in 2months.  The prescription has been sent.     His cholesterol panel looks fine-no medication changes.

## 2021-04-12 ENCOUNTER — MYC MEDICAL ADVICE (OUTPATIENT)
Dept: PEDIATRICS | Facility: CLINIC | Age: 66
End: 2021-04-12

## 2021-04-12 NOTE — TELEPHONE ENCOUNTER
Attempted to call pt, left VM, will send mychart.     Pt has OV in 2 mo, pt should have labs redrawn at appointment.     Next 5 appointments (look out 90 days)    Jun 09, 2021  7:15 AM  (Arrive by 7:00 AM)  Office Visit with Vinod Chris MD  [facility] ([facility] ) [address] [place] [address]  [address]  [facility] [id number] [phone]

## 2021-04-12 NOTE — TELEPHONE ENCOUNTER
Call placed to pt   LM for pt to return call to the clinic    Thank you  Concha Byrd RN on 4/12/2021 at 9:19 AM

## 2021-04-13 NOTE — TELEPHONE ENCOUNTER
Called and left message for patient requesting a call back. Radha Chambers RN on 4/13/2021 at 2:13 PM

## 2021-04-16 NOTE — TELEPHONE ENCOUNTER
Called and left message for patient requesting a call back. Radha Chambers RN on 4/16/2021 at 4:03 PM

## 2021-04-17 ENCOUNTER — MYC MEDICAL ADVICE (OUTPATIENT)
Dept: PEDIATRICS | Facility: CLINIC | Age: 66
End: 2021-04-17

## 2021-04-19 NOTE — TELEPHONE ENCOUNTER
Patient returned call to RN. Reviewed lab result note and flaveitt message. Patient will  new dose today at pharmacy. Will return for OV in June and will repeat labs then.

## 2021-05-03 ENCOUNTER — OFFICE VISIT - HEALTHEAST (OUTPATIENT)
Dept: CARDIOLOGY | Facility: CLINIC | Age: 66
End: 2021-05-03

## 2021-05-03 DIAGNOSIS — R94.39 EQUIVOCAL STRESS TEST: ICD-10-CM

## 2021-05-03 DIAGNOSIS — R07.2 PRECORDIAL CHEST PAIN: ICD-10-CM

## 2021-05-03 DIAGNOSIS — G71.11 MYOTONIC MUSCULAR DYSTROPHY (H): ICD-10-CM

## 2021-05-03 ASSESSMENT — MIFFLIN-ST. JEOR: SCORE: 1947.46

## 2021-05-25 ENCOUNTER — RECORDS - HEALTHEAST (OUTPATIENT)
Dept: ADMINISTRATIVE | Facility: CLINIC | Age: 66
End: 2021-05-25

## 2021-05-27 VITALS
DIASTOLIC BLOOD PRESSURE: 80 MMHG | RESPIRATION RATE: 16 BRPM | BODY MASS INDEX: 36.65 KG/M2 | SYSTOLIC BLOOD PRESSURE: 124 MMHG | WEIGHT: 256 LBS | HEIGHT: 70 IN | HEART RATE: 64 BPM

## 2021-06-01 ENCOUNTER — COMMUNICATION - HEALTHEAST (OUTPATIENT)
Dept: CARDIOLOGY | Facility: CLINIC | Age: 66
End: 2021-06-01

## 2021-06-02 ENCOUNTER — HOSPITAL ENCOUNTER (OUTPATIENT)
Dept: CT IMAGING | Facility: CLINIC | Age: 66
Discharge: HOME | End: 2021-06-02
Attending: INTERNAL MEDICINE
Payer: COMMERCIAL

## 2021-06-02 ENCOUNTER — TRANSFERRED RECORDS (OUTPATIENT)
Dept: HEALTH INFORMATION MANAGEMENT | Facility: CLINIC | Age: 66
End: 2021-06-02

## 2021-06-02 DIAGNOSIS — R94.39 EQUIVOCAL STRESS TEST: ICD-10-CM

## 2021-06-02 DIAGNOSIS — R07.2 PRECORDIAL CHEST PAIN: ICD-10-CM

## 2021-06-02 LAB
BSA FOR ECHO PROCEDURE: 2.36 M2
CREAT BLD-MCNC: 0.8 MG/DL (ref 0.7–1.3)
CV CALCIUM SCORE AGATSTON LM: 1
CV CALCIUM SCORING AGATSON LAD: 85
CV CALCIUM SCORING AGATSTON CX: 14
CV CALCIUM SCORING AGATSTON RCA: 34
CV CALCIUM SCORING AGATSTON TOTAL: 134
GFR SERPL CREATININE-BSD FRML MDRD: >60 ML/MIN/1.73M2
LEFT VENTRICLE HEART RATE: 60 BPM

## 2021-06-02 ASSESSMENT — MIFFLIN-ST. JEOR: SCORE: 1920.24

## 2021-06-09 ENCOUNTER — OFFICE VISIT (OUTPATIENT)
Dept: PEDIATRICS | Facility: CLINIC | Age: 66
End: 2021-06-09
Payer: COMMERCIAL

## 2021-06-09 VITALS
DIASTOLIC BLOOD PRESSURE: 70 MMHG | TEMPERATURE: 98 F | HEIGHT: 70 IN | OXYGEN SATURATION: 97 % | WEIGHT: 256 LBS | RESPIRATION RATE: 16 BRPM | SYSTOLIC BLOOD PRESSURE: 108 MMHG | BODY MASS INDEX: 36.65 KG/M2 | HEART RATE: 59 BPM

## 2021-06-09 DIAGNOSIS — R07.89 ATYPICAL CHEST PAIN: Primary | ICD-10-CM

## 2021-06-09 DIAGNOSIS — E89.0 POSTOPERATIVE HYPOTHYROIDISM: ICD-10-CM

## 2021-06-09 LAB
T4 FREE SERPL-MCNC: 1.24 NG/DL (ref 0.76–1.46)
TSH SERPL DL<=0.005 MIU/L-ACNC: 4.54 MU/L (ref 0.4–4)

## 2021-06-09 PROCEDURE — 84443 ASSAY THYROID STIM HORMONE: CPT | Performed by: INTERNAL MEDICINE

## 2021-06-09 PROCEDURE — 99214 OFFICE O/P EST MOD 30 MIN: CPT | Performed by: INTERNAL MEDICINE

## 2021-06-09 PROCEDURE — 84439 ASSAY OF FREE THYROXINE: CPT | Performed by: INTERNAL MEDICINE

## 2021-06-09 PROCEDURE — 36415 COLL VENOUS BLD VENIPUNCTURE: CPT | Performed by: INTERNAL MEDICINE

## 2021-06-09 RX ORDER — LEVOTHYROXINE SODIUM 150 UG/1
150 TABLET ORAL DAILY
Qty: 90 TABLET | Refills: 0 | Status: CANCELLED | OUTPATIENT
Start: 2021-06-09

## 2021-06-09 ASSESSMENT — MIFFLIN-ST. JEOR: SCORE: 1947.46

## 2021-06-09 NOTE — PROGRESS NOTES
Assessment & Plan     (R07.89) Atypical chest pain  (primary encounter diagnosis)  Comment:   Plan: Atypical chest pain and recent cardiac work-up as noted.  Symptoms have resolved entirely on H2 blocker-continue twice daily.  Discussed reflux precautions.    (E89.0) Postoperative hypothyroidism  Comment:    Suppressed TSH, repeat TSH today and continue reducing dose if needed.  Plan: TSH with free T4 reflex          Vinod Chris MD  [facility] Paredes is a 66 year old who presents for the following health issues     HPI     Presents today for follow-up of episode of atypical chest pain.  Stress test on 3/8/2021: Nuclear stress test likely negative for inducible myocardial ischemia.  There was question of a small area of mild ischemia in the mid to distal inferior segment left ventricle however this was equivocal as it may represent diaphragmatic attenuation.  Left ventricular function was normal.  Subsequent CT coronary calcium scan on 6/2/2021: Score 134.  75th percentile for age: Normal left main, minimal disease LAD and left circumflex, mild disease mid RCA.    Patient was started on twice daily H2 blocker with resolution of chest discomfort.  States that he has had no further episodes of chest discomfort while on the medication.    History of hypothyroidism.  Recent dose reductions secondary to suppressed TSH:  Lab Results   Component Value Date    TSH 0.30 04/05/2021    TSH 0.23 03/08/2021         Current Rx: Levothyroxine 150 mcg daily since April.  No symptoms of hypo or hyperthyroidism: no decreased or increased weight, no feeling cold/chilly or excessively warm, no diarrhea or constipation, no undue sweatiness, anxiety or palpitations.      Patient Active Problem List   Diagnosis     Hereditary progressive muscular dystrophy (H)     Tinnitus     CARDIOVASCULAR SCREENING; LDL GOAL LESS THAN 130     Health Care Home     Advanced directives, counseling/discussion      "Family history of prostate cancer     Obesity     Postoperative hypothyroidism     Gastroesophageal reflux disease without esophagitis     Crohn's disease of small intestine without complication (H)     Current Outpatient Medications   Medication Sig Dispense Refill     aspirin (ASA) 81 MG EC tablet Take 1 tablet (81 mg) by mouth daily 30 tablet 1     atorvastatin (LIPITOR) 40 MG tablet Take 1 tablet (40 mg) by mouth every evening 90 tablet 3     famotidine (PEPCID) 40 MG tablet Take 1 tablet (40 mg) by mouth 2 times daily as needed for heartburn 180 tablet 3     fexofenadine (ALLEGRA) 180 MG tablet Take 180 mg by mouth daily as needed for allergies       levothyroxine (SYNTHROID/LEVOTHROID) 150 MCG tablet Take 1 tablet (150 mcg) by mouth daily 90 tablet 0     lisinopril (ZESTRIL) 10 MG tablet Take 1 tablet (10 mg) by mouth daily 90 tablet 3     mesalamine ER (PENTASA) 500 MG CR capsule Take 2,000 mg by mouth 2 times daily          Review of Systems   Constitutional, HEENT, cardiovascular, pulmonary, gi and gu systems are negative, except as otherwise noted.      Objective    /70 (Cuff Size: Adult Large)   Pulse 59   Temp 98  F (36.7  C)   Resp 16   Ht 1.778 m (5' 10\")   Wt 116.1 kg (256 lb)   SpO2 97%   BMI 36.73 kg/m    Body mass index is 36.73 kg/m .  Physical Exam   GENERAL: healthy, alert and no distress  EYES: Eyes grossly normal to inspection, PERRL and conjunctivae and sclerae normal  MS: no gross musculoskeletal defects noted, no edema  NEURO: upper extremity neuromuscular atrophy due to MD  PSYCH: mentation appears normal, affect normal/bright    "

## 2021-06-09 NOTE — PATIENT INSTRUCTIONS
Continue famotidine twice daily, avoid trigger foods  Rechecking thyroid, may need to change dose  Physical this Fall

## 2021-06-11 RX ORDER — LEVOTHYROXINE SODIUM 150 UG/1
150 TABLET ORAL DAILY
Qty: 90 TABLET | Refills: 3 | Status: SHIPPED | OUTPATIENT
Start: 2021-06-11 | End: 2022-07-06

## 2021-06-17 NOTE — PATIENT INSTRUCTIONS - HE
- Walk at least 30 minutes every day for exercise    - Stick to a healthy mediterranean diet with very limited processed/packaged foods. Eat lots of produce (vegetables, whole grains, some fruits) with some fish and chicken. Limit your animal fats (dairy and red meats).    - CT scan of the arteries to look for blockages. We will call with results and recommendations.

## 2021-06-30 NOTE — PROGRESS NOTES
Progress Notes by Guerita Rucker MD at 5/3/2021 10:50 AM     Author: Guerita Rucker MD Service: -- Author Type: Physician    Filed: 5/3/2021 12:35 PM Encounter Date: 5/3/2021 Status: Signed    : Guerita Rucker MD (Physician)         Thank you, Dr. Morales ref. provider found, for asking the [facility] team to see Mr. Manuel Schofield to evaluate heart disease.      Assessment/Recommendations   Assessment/Plan:    66 year old man with some coronary calcification seen on a CT scan and a borderline stress test suggesting distal inferior ischemia. Given his symptoms we will get a coronary CTA with calcium score to rule out significant blockages. He should continue on the aspirin and statin long term. Diet and exercise reviewed.    F/U pending above results       History of Present Illness/Subjective    Mr. Manuel Schofield is a 66 y.o. 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 Reggie is a retired  who has suffered from chest pain and heart burn off/on for years. He presented to the ED last month with chest pressure and heart burn. EKG and troponins were normal and he was monitored overnight at [facility] as there were no beds at [facility]. At [facility] he had a nuclear stress test which showed a small, mild area of distal inferior ischemia and he was started on aspirin and a statin. A CT chest/abd/pelvis at [facility] showed some coronary calcification. Reggie has made dietary changes and notes improvement in his symptoms. He comes to cardiology today for further evaluation.    Reggie is active around his home and yard but does not exercise. He notes with dietary changes and quitting smoking last year that he has less dyspnea with a flight of stairs. There is no exertional chest discomfort and he denies dizziness, syncope, palpitations, or edema. He is down 6 lbs recently and 20 lbs over the past year with dietary changes.           Physical Examination Review of Systems   Vitals:    05/03/21 1102   BP: 124/80   Pulse: 64   Resp: 16     Body mass index is 36.73 kg/m .  Wt Readings from Last 3 Encounters:   05/03/21 (!) 256 lb (116.1 kg)   03/07/21 (!) 250 lb (113.4 kg)     [unfilled]  General Appearance:   no distress, normal body habitus   ENT/Mouth: membranes moist, no oral lesions or bleeding gums.      EYES:  no scleral icterus, normal conjunctivae   Neck: no carotid bruits or thyromegaly   Chest/Lungs:   lungs are clear to auscultation, no rales or wheezing, no sternal scar, equal chest wall expansion    Cardiovascular:   Regular. Normal first and second heart sounds with no murmurs, rubs, or gallops. The right radial, dorsalis pedis and posterior tibial pulses are intact.  The left radial, dorsalis pedis and posterior tibial pulses are intact. Jugular venous pressure flat, no edema bilaterally    Abdomen:  no organomegaly, masses, bruits, or tenderness; bowel sounds are present   Extremities: no cyanosis or clubbing   Skin: no xanthelasma, warm.    Neurologic: normal  bilateral, no tremors     Psychiatric: alert and oriented x3, calm     General: WNL  Eyes: WNL  Ears/Nose/Throat: WNL  Lungs: WNL  Heart: WNL  Stomach: Heartburn  Bladder: WNL  Muscle/Joints: Joint Pain, Muscle Weakness  Skin: WNL  Nervous System: WNL  Mental Health: Anxiety, Depression     Blood: WNL     Medical History  Surgical History Family History Social History   Past Medical History:   Diagnosis Date   ? Acid reflux    ? Coronary artery disease    ? Crohn's disease (H)    ? Hypertension    ? Motor neuron disease (H)     Past Surgical History:   Procedure Laterality Date   ? APPENDECTOMY     ? CHOLECYSTECTOMY     ? THYROIDECTOMY     ? TONSILLECTOMY      Family History   Problem Relation Age of Onset   ? Alzheimer's disease Father     Social History     Socioeconomic History   ? Marital status:      Spouse name: Not on file   ? Number of children:  Not on file   ? Years of education: Not on file   ? Highest education level: Not on file   Occupational History   ? Not on file   Social Needs   ? Financial resource strain: Not on file   ? Food insecurity     Worry: Not on file     Inability: Not on file   ? Transportation needs     Medical: Not on file     Non-medical: Not on file   Tobacco Use   ? Smoking status: Former Smoker     Packs/day: 1.00     Years: 50.00     Pack years: 50.00     Types: Cigarettes     Quit date: 3/1/2020     Years since quittin.1   ? Smokeless tobacco: Never Used   ? Tobacco comment: quit 2020   Substance and Sexual Activity   ? Alcohol use: Yes     Comment: rare   ? Drug use: Not Currently     Comment: quit over 20 years ago   ? Sexual activity: Not on file   Lifestyle   ? Physical activity     Days per week: Not on file     Minutes per session: Not on file   ? Stress: Not on file   Relationships   ? Social connections     Talks on phone: Not on file     Gets together: Not on file     Attends Gnosticism service: Not on file     Active member of club or organization: Not on file     Attends meetings of clubs or organizations: Not on file     Relationship status: Not on file   ? Intimate partner violence     Fear of current or ex partner: Not on file     Emotionally abused: Not on file     Physically abused: Not on file     Forced sexual activity: Not on file   Other Topics Concern   ? Not on file   Social History Narrative   ? Not on file          Medications  Allergies   Current Outpatient Medications   Medication Sig Dispense Refill   ? aspirin 81 MG EC tablet Take 81 mg by mouth daily.     ? atorvastatin (LIPITOR) 40 MG tablet Take 40 mg by mouth every evening.     ? famotidine (PEPCID) 40 MG tablet Take 40 mg by mouth 2 (two) times a day as needed.     ? fexofenadine (ALLEGRA) 180 MG tablet Take 180 mg by mouth daily as needed.     ? levothyroxine (SYNTHROID, LEVOTHROID) 150 MCG tablet Take 150 mcg by mouth daily.     ?  lisinopriL (PRINIVIL,ZESTRIL) 10 MG tablet Take 10 mg by mouth daily.     ? mesalamine (PENTASA) 500 MG CR capsule Take 2,000 mg by mouth 2 (two) times a day.       No current facility-administered medications for this visit.     Allergies   Allergen Reactions   ? Peanut Itching, Rash, Shortness Of Breath and Swelling   ? Fruit    ? Other Environmental Allergy          Lab Results    Chemistry/lipid CBC Cardiac Enzymes/BNP/TSH/INR   Lab Results   Component Value Date    CREATININE 0.80 03/07/2021    BUN 7 (L) 03/07/2021    K 3.7 03/07/2021     03/07/2021     03/07/2021    CO2 24 03/07/2021    Lab Results   Component Value Date    WBC 9.3 03/07/2021    HGB 15.3 03/07/2021    HCT 44.4 03/07/2021    MCV 85 03/07/2021     03/07/2021    Lab Results   Component Value Date    TROPONINI <0.01 03/07/2021    BNP 29 03/07/2021    INR 1.04 03/07/2021

## 2021-07-06 VITALS — WEIGHT: 250 LBS | HEIGHT: 70 IN | BODY MASS INDEX: 35.79 KG/M2

## 2021-09-26 ASSESSMENT — ENCOUNTER SYMPTOMS
ARTHRALGIAS: 0
ABDOMINAL PAIN: 1
FREQUENCY: 0
MYALGIAS: 0
COUGH: 0
SHORTNESS OF BREATH: 0
PALPITATIONS: 0
FEVER: 0
HEARTBURN: 0
DYSURIA: 0
JOINT SWELLING: 0
WEAKNESS: 0
NAUSEA: 0
HEMATOCHEZIA: 0
HEMATURIA: 0
PARESTHESIAS: 0
HEADACHES: 0
DIZZINESS: 0
CONSTIPATION: 1
EYE PAIN: 0
SORE THROAT: 0
NERVOUS/ANXIOUS: 1
DIARRHEA: 1
CHILLS: 0

## 2021-09-26 ASSESSMENT — ACTIVITIES OF DAILY LIVING (ADL): CURRENT_FUNCTION: NO ASSISTANCE NEEDED

## 2021-09-29 ENCOUNTER — OFFICE VISIT (OUTPATIENT)
Dept: PEDIATRICS | Facility: CLINIC | Age: 66
End: 2021-09-29
Payer: COMMERCIAL

## 2021-09-29 VITALS
WEIGHT: 262.8 LBS | HEIGHT: 70 IN | DIASTOLIC BLOOD PRESSURE: 68 MMHG | OXYGEN SATURATION: 96 % | RESPIRATION RATE: 22 BRPM | BODY MASS INDEX: 37.62 KG/M2 | SYSTOLIC BLOOD PRESSURE: 114 MMHG | TEMPERATURE: 97 F | HEART RATE: 61 BPM

## 2021-09-29 DIAGNOSIS — K50.00 CROHN'S DISEASE OF SMALL INTESTINE WITHOUT COMPLICATION (H): ICD-10-CM

## 2021-09-29 DIAGNOSIS — K21.9 GASTROESOPHAGEAL REFLUX DISEASE WITHOUT ESOPHAGITIS: ICD-10-CM

## 2021-09-29 DIAGNOSIS — Z12.5 ENCOUNTER FOR SCREENING FOR MALIGNANT NEOPLASM OF PROSTATE: ICD-10-CM

## 2021-09-29 DIAGNOSIS — E66.01 MORBID OBESITY (H): ICD-10-CM

## 2021-09-29 DIAGNOSIS — Z80.42 FAMILY HISTORY OF PROSTATE CANCER: ICD-10-CM

## 2021-09-29 DIAGNOSIS — Z23 NEED FOR PROPHYLACTIC VACCINATION AND INOCULATION AGAINST INFLUENZA: ICD-10-CM

## 2021-09-29 DIAGNOSIS — G71.09 HEREDITARY PROGRESSIVE MUSCULAR DYSTROPHY (H): ICD-10-CM

## 2021-09-29 DIAGNOSIS — Z00.00 ENCOUNTER FOR MEDICARE ANNUAL WELLNESS EXAM: Primary | ICD-10-CM

## 2021-09-29 DIAGNOSIS — M21.612 BUNION, LEFT: ICD-10-CM

## 2021-09-29 DIAGNOSIS — E89.0 POSTOPERATIVE HYPOTHYROIDISM: ICD-10-CM

## 2021-09-29 PROCEDURE — 80048 BASIC METABOLIC PNL TOTAL CA: CPT | Performed by: INTERNAL MEDICINE

## 2021-09-29 PROCEDURE — 36415 COLL VENOUS BLD VENIPUNCTURE: CPT | Performed by: INTERNAL MEDICINE

## 2021-09-29 PROCEDURE — G0103 PSA SCREENING: HCPCS | Performed by: INTERNAL MEDICINE

## 2021-09-29 PROCEDURE — 99397 PER PM REEVAL EST PAT 65+ YR: CPT | Mod: 25 | Performed by: INTERNAL MEDICINE

## 2021-09-29 PROCEDURE — G0008 ADMIN INFLUENZA VIRUS VAC: HCPCS | Performed by: INTERNAL MEDICINE

## 2021-09-29 PROCEDURE — 84443 ASSAY THYROID STIM HORMONE: CPT | Performed by: INTERNAL MEDICINE

## 2021-09-29 PROCEDURE — 90662 IIV NO PRSV INCREASED AG IM: CPT | Performed by: INTERNAL MEDICINE

## 2021-09-29 ASSESSMENT — ENCOUNTER SYMPTOMS
CHILLS: 0
JOINT SWELLING: 0
PARESTHESIAS: 0
HEARTBURN: 0
ABDOMINAL PAIN: 1
DYSURIA: 0
EYE PAIN: 0
NERVOUS/ANXIOUS: 1
HEADACHES: 0
NAUSEA: 0
MYALGIAS: 0
COUGH: 0
SHORTNESS OF BREATH: 0
WEAKNESS: 0
DIZZINESS: 0
HEMATURIA: 0
FEVER: 0
CONSTIPATION: 1
FREQUENCY: 0
PALPITATIONS: 0
SORE THROAT: 0
DIARRHEA: 1
HEMATOCHEZIA: 0
ARTHRALGIAS: 0

## 2021-09-29 ASSESSMENT — MIFFLIN-ST. JEOR: SCORE: 1978.3

## 2021-09-29 ASSESSMENT — ACTIVITIES OF DAILY LIVING (ADL): CURRENT_FUNCTION: NO ASSISTANCE NEEDED

## 2021-09-29 NOTE — PATIENT INSTRUCTIONS
Patient Education   Personalized Prevention Plan  You are due for the preventive services outlined below.  Your care team is available to assist you in scheduling these services.  If you have already completed any of these items, please share that information with your care team to update in your medical record.  Health Maintenance Due   Topic Date Due     ANNUAL REVIEW OF HM ORDERS  Never done     COVID-19 Vaccine (3 - Pfizer risk 3-dose series) 04/14/2021     FALL RISK ASSESSMENT  09/22/2021     Annual Wellness Visit  09/22/2021     Discuss Advance Care Planning  10/25/2021        Patient Education   Personalized Prevention Plan  You are due for the preventive services outlined below.  Your care team is available to assist you in scheduling these services.  If you have already completed any of these items, please share that information with your care team to update in your medical record.  Health Maintenance Due   Topic Date Due     COVID-19 Vaccine (3 - Pfizer risk 3-dose series) 04/14/2021     FALL RISK ASSESSMENT  09/22/2021     Preventive Health Recommendations  See your health care provider every year to    Review health changes.     Discuss preventive care.      Review your medicines if your doctor has prescribed any.    Talk with your health care provider about whether you should have a test to screen for prostate cancer (PSA).    Every 3 years, have a diabetes test (fasting glucose). If you are at risk for diabetes, you should have this test more often.    Every 5 years, have a cholesterol test. Have this test more often if you are at risk for high cholesterol or heart disease.     Every 10 years, have a colonoscopy. Or, have a yearly FIT test (stool test). These exams will check for colon cancer.    Talk to with your health care provider about screening for Abdominal Aortic Aneurysm if you have a family history of AAA or have a history of smoking.    Shots:     Get a flu shot each year.     Get a  tetanus shot every 10 years.     Talk to your doctor about your pneumonia vaccines. There are now two you should receive - Pneumovax (PPSV 23) and Prevnar (PCV 13).    Talk to your pharmacist about a shingles vaccine.     Talk to your doctor about the hepatitis B vaccine.    Nutrition:     Eat at least 5 servings of fruits and vegetables each day.     Eat whole-grain bread, whole-wheat pasta and brown rice instead of white grains and rice.     Get adequate Calcium and Vitamin D.     Lifestyle    Exercise for at least 150 minutes a week (30 minutes a day, 5 days a week). This will help you control your weight and prevent disease.     Limit alcohol to one drink per day.     No smoking.     Wear sunscreen to prevent skin cancer.     See your dentist every six months for an exam and cleaning.     See your eye doctor every 1 to 2 years to screen for conditions such as glaucoma, macular degeneration and cataracts.    Personalized Prevention Plan  You are due for the preventive services outlined below.  Your care team is available to assist you in scheduling these services.  If you have already completed any of these items, please share that information with your care team to update in your medical record.  Health Maintenance   Topic Date Due     COVID-19 Vaccine (3 - Pfizer risk 3-dose series) 04/14/2021     FALL RISK ASSESSMENT  09/22/2021     TSH W/FREE T4 REFLEX  06/09/2022     MEDICARE ANNUAL WELLNESS VISIT  09/29/2022     ANNUAL REVIEW OF HM ORDERS  09/29/2022     COLORECTAL CANCER SCREENING  08/03/2023     LIPID  04/05/2026     ADVANCE CARE PLANNING  09/29/2026     DTAP/TDAP/TD IMMUNIZATION (3 - Td or Tdap) 06/13/2027     HEPATITIS C SCREENING  Completed     PHQ-2  Completed     Pneumococcal Vaccine: 65+ Years  Completed     ZOSTER IMMUNIZATION  Completed     AORTIC ANEURYSM SCREENING (SYSTEM ASSIGNED)  Completed     IPV IMMUNIZATION  Aged Out     MENINGITIS IMMUNIZATION  Aged Out     HEPATITIS B IMMUNIZATION  Aged  Out     INFLUENZA VACCINE  Discontinued

## 2021-09-29 NOTE — PROGRESS NOTES
"SUBJECTIVE:   Manuel Schofield is a 66 year old male who presents for Preventive Visit.      Patient has been advised of split billing requirements and indicates understanding: Yes   Are you in the first 12 months of your Medicare coverage?  No    Healthy Habits:     In general, how would you rate your overall health?  Fair    Frequency of exercise:  2-3 days/week    Duration of exercise:  15-30 minutes    Do you usually eat at least 4 servings of fruit and vegetables a day, include whole grains    & fiber and avoid regularly eating high fat or \"junk\" foods?  Yes    Taking medications regularly:  No    Medication side effects:  None    Ability to successfully perform activities of daily living:  No assistance needed    Home Safety:  No safety concerns identified    Hearing Impairment:  Need to ask people to speak up or repeat themselves and difficulty understanding soft or whispered speech    In the past 6 months, have you been bothered by leaking of urine?  No    In general, how would you rate your overall mental or emotional health?  Good      PHQ-2 Total Score: 2    Additional concerns today:  Yes    Do you feel safe in your environment? Yes    Have you ever done Advance Care Planning? (For example, a Health Directive, POLST, or a discussion with a medical provider or your loved ones about your wishes): No, advance care planning information given to patient to review.  Patient declined advance care planning discussion at this time.       Fall risk  Fallen 2 or more times in the past year?: No  Any fall with injury in the past year?: No    Cognitive Screening   1) Repeat 3 items (Leader, Season, Table)    2) Clock draw: Unable to complete - cannot hold pen  3) 3 item recall: Recalls 3 objects  Results: 3 items recalled: COGNITIVE IMPAIRMENT LESS LIKELY    Mini-CogTM Copyright S Amina. Licensed by the author for use in [facility]; reprinted with permission (yvette@.[facility]). All rights reserved.      Do " you have sleep apnea, excessive snoring or daytime drowsiness?: Does snore, is tired during the day. Tosses and turns at night.    Reviewed and updated as needed this visit by clinical staff  Tobacco  Allergies  Meds   Med Hx  Surg Hx  Fam Hx  Soc Hx        Reviewed and updated as needed this visit by Provider                Social History     Tobacco Use     Smoking status: Former Smoker     Packs/day: 1.00     Years: 40.00     Pack years: 40.00     Types: Cigarettes     Quit date: 2020     Years since quittin.6     Smokeless tobacco: Never Used   Substance Use Topics     Alcohol use: Yes     Alcohol/week: 0.0 standard drinks     Comment: rare     If you drink alcohol do you typically have >3 drinks per day or >7 drinks per week? No    Alcohol Use 2021   Prescreen: >3 drinks/day or >7 drinks/week? -   Prescreen: >3 drinks/day or >7 drinks/week? No       Current providers sharing in care for this patient include:   Patient Care Team:  Vinod Chris MD as PCP - General  Vinod Chris MD as Assigned PCP  Guerita Rucker MD as Assigned Heart and Vascular Provider    The following health maintenance items are reviewed in Epic and correct as of today:  Health Maintenance Due   Topic Date Due     COVID-19 Vaccine (3 - Pfizer risk 3-dose series) 2021     FALL RISK ASSESSMENT  2021     Patient Active Problem List   Diagnosis     Hereditary progressive muscular dystrophy (H)     Tinnitus     CARDIOVASCULAR SCREENING; LDL GOAL LESS THAN 130     Health Care Home     Advanced directives, counseling/discussion     Family history of prostate cancer     Postoperative hypothyroidism     Gastroesophageal reflux disease without esophagitis     Crohn's disease of small intestine without complication (H)     Morbid obesity (H)     Past Surgical History:   Procedure Laterality Date     APPENDECTOMY       APPENDECTOMY       CHOLECYSTECTOMY       CHOLECYSTECTOMY       COLONOSCOPY  2013     Colonoscopy Dr. Pruitt [facility]     COLONOSCOPY  2013    Procedure: COLONOSCOPY;  Colonoscopy ;  Surgeon: Vinod Pruitt MD;  Location:  GI     ENT SURGERY      T&A as a child     HC THYROID LOBECTOMY,UNILAT       HC THYROID LOBECTOMY,UNILAT      resection goiter     THYROIDECTOMY       TONSILLECTOMY         Social History     Tobacco Use     Smoking status: Former Smoker     Packs/day: 1.00     Years: 40.00     Pack years: 40.00     Types: Cigarettes     Quit date: 2020     Years since quittin.6     Smokeless tobacco: Never Used   Substance Use Topics     Alcohol use: Yes     Alcohol/week: 0.0 standard drinks     Comment: rare     Family History   Problem Relation Age of Onset     Heart Disease Mother      Cancer - colorectal Mother      Alzheimer Disease Father      Neurologic Disorder Father      Gastrointestinal Disease Brother         colon polyps     Diabetes Sister      Diabetes Brother          Current Outpatient Medications   Medication Sig Dispense Refill     aspirin (ASA) 81 MG EC tablet Take 1 tablet (81 mg) by mouth daily 30 tablet 1     atorvastatin (LIPITOR) 40 MG tablet Take 1 tablet (40 mg) by mouth every evening 90 tablet 3     famotidine (PEPCID) 40 MG tablet Take 1 tablet (40 mg) by mouth 2 times daily as needed for heartburn 180 tablet 3     fexofenadine (ALLEGRA) 180 MG tablet Take 180 mg by mouth daily as needed for allergies       levothyroxine (SYNTHROID/LEVOTHROID) 150 MCG tablet Take 1 tablet (150 mcg) by mouth daily 90 tablet 3     lisinopril (ZESTRIL) 10 MG tablet Take 1 tablet (10 mg) by mouth daily 90 tablet 3     mesalamine ER (PENTASA) 500 MG CR capsule Take 2,000 mg by mouth 2 times daily         Review of Systems   Constitutional: Negative for chills and fever.   HENT: Positive for congestion. Negative for ear pain, hearing loss and sore throat.    Eyes: Negative for pain and visual disturbance.   Respiratory: Negative for cough and shortness of breath.   "  Cardiovascular: Negative for chest pain, palpitations and peripheral edema.   Gastrointestinal: Positive for abdominal pain, constipation and diarrhea. Negative for heartburn, hematochezia and nausea.   Genitourinary: Positive for impotence. Negative for discharge, dysuria, frequency, genital sores, hematuria and urgency.   Musculoskeletal: Negative for arthralgias, joint swelling and myalgias.   Skin: Negative for rash.   Neurological: Negative for dizziness, weakness, headaches and paresthesias.   Psychiatric/Behavioral: Negative for mood changes. The patient is nervous/anxious.        OBJECTIVE:   /68 (BP Location: Right arm, Patient Position: Sitting, Cuff Size: Adult Large)   Pulse 61   Temp 97  F (36.1  C) (Tympanic)   Resp 22   Ht 1.778 m (5' 10\")   Wt 119.2 kg (262 lb 12.8 oz)   SpO2 96%   BMI 37.71 kg/m   Estimated body mass index is 37.71 kg/m  as calculated from the following:    Height as of this encounter: 1.778 m (5' 10\").    Weight as of this encounter: 119.2 kg (262 lb 12.8 oz).  Physical Exam  GENERAL: healthy, alert and no distress  EYES: Eyes grossly normal to inspection, PERRL and conjunctivae and sclerae normal  HENT: ear canals and TM's normal, nose and mouth without ulcers or lesions  NECK: no adenopathy, no asymmetry, masses, or scars and thyroid normal to palpation  RESP: lungs clear to auscultation - no rales, rhonchi or wheezes  CV: regular rate and rhythm, normal S1 S2, no S3 or S4, no murmur, click or rub, no peripheral edema and peripheral pulses strong  ABDOMEN: soft, nontender, no hepatosplenomegaly, no masses and bowel sounds normal  MS: no gross musculoskeletal defects noted, no edema  SKIN: no suspicious lesions or rashes,   NEURO: b/l upper extremity, hand neuromuscular atrophy  PSYCH: mentation appears normal, affect normal/bright    ASSESSMENT / PLAN:   (Z00.00) Encounter for Medicare annual wellness exam  (primary encounter diagnosis)    (G71.09) Hereditary " "progressive muscular dystrophy (H)  Comment:   Plan: motor neuron disease -primarily involves upper extremities, stable.    Currently lives independently with his wife, uses adaptive equipment/declines need for home care or PCA services    (K50.00) Crohn's disease of small intestine without complication (H)  Comment:   Plan: sx well controlled, continue mesalamine    (E89.0) Postoperative hypothyroidism  Comment:   Due for labwork  Plan: TSH with free T4 reflex          (K21.9) Gastroesophageal reflux disease without esophagitis  Comment:   Plan: recurrent GERD sx.   Well controlled on famotidine 40mg BID, pt will try reducing dose to 40mg daily    (E66.01) Morbid obesity (H)  Comment: continues to work on diet, increasing activity  Plan: Basic metabolic panel          (M21.612) Bunion, left  Comment:   Plan: occ left toe pain, bunion deformity on exam.  Recommended supportive footwear with wide toebox    (Z80.42) Family history of prostate cancer  (Z12.5) Encounter for screening for malignant neoplasm of prostate   Comment:   Plan: Prostate Specific Antigen Screen          (Z23) Need for prophylactic vaccination and inoculation against influenza  Comment:   Plan: INFLUENZA, QUAD, HIGH DOSE, PF, 65YR + (FLUZONE        HD)            COUNSELING:  Reviewed preventive health counseling, as reflected in patient instructions       Regular exercise       Healthy diet/nutrition       Colon cancer screening       Prostate cancer screening    Estimated body mass index is 37.71 kg/m  as calculated from the following:    Height as of this encounter: 1.778 m (5' 10\").    Weight as of this encounter: 119.2 kg (262 lb 12.8 oz).    Weight management plan: Discussed healthy diet and exercise guidelines    He reports that he quit smoking about 19 months ago. His smoking use included cigarettes. He has a 40.00 pack-year smoking history. He has never used smokeless tobacco.      Appropriate preventive services were discussed with this " patient, including applicable screening as appropriate for cardiovascular disease, diabetes, osteopenia/osteoporosis, and glaucoma.  As appropriate for age/gender, discussed screening for colorectal cancer, prostate cancer, breast cancer, and cervical cancer. Checklist reviewing preventive services available has been given to the patient.    Reviewed patients plan of care and provided an AVS. The Basic Care Plan (routine screening as documented in Health Maintenance) for Manuel meets the Care Plan requirement. This Care Plan has been established and reviewed with the Patient.    Counseling Resources:  ATP IV Guidelines  Pooled Cohorts Equation Calculator  Breast Cancer Risk Calculator  Breast Cancer: Medication to Reduce Risk  FRAX Risk Assessment  ICSI Preventive Guidelines  Dietary Guidelines for Americans, 2010  USDA's MyPlate  ASA Prophylaxis  Lung CA Screening    Vinod Chris MD  [facility]    Identified Health Risks:

## 2021-09-30 LAB
ANION GAP SERPL CALCULATED.3IONS-SCNC: 8 MMOL/L (ref 3–14)
BUN SERPL-MCNC: 15 MG/DL (ref 7–30)
CALCIUM SERPL-MCNC: 8.6 MG/DL (ref 8.5–10.1)
CHLORIDE BLD-SCNC: 107 MMOL/L (ref 94–109)
CO2 SERPL-SCNC: 23 MMOL/L (ref 20–32)
CREAT SERPL-MCNC: 0.79 MG/DL (ref 0.66–1.25)
GFR SERPL CREATININE-BSD FRML MDRD: >90 ML/MIN/1.73M2
GLUCOSE BLD-MCNC: 104 MG/DL (ref 70–99)
POTASSIUM BLD-SCNC: 3.9 MMOL/L (ref 3.4–5.3)
PSA SERPL-MCNC: 0.68 UG/L (ref 0–4)
SODIUM SERPL-SCNC: 138 MMOL/L (ref 133–144)
TSH SERPL DL<=0.005 MIU/L-ACNC: 3.74 MU/L (ref 0.4–4)

## 2021-10-08 DIAGNOSIS — I10 ESSENTIAL HYPERTENSION: ICD-10-CM

## 2021-10-11 RX ORDER — LISINOPRIL 10 MG/1
TABLET ORAL
Qty: 90 TABLET | Refills: 3 | Status: SHIPPED | OUTPATIENT
Start: 2021-10-11 | End: 2022-09-29

## 2021-10-11 NOTE — TELEPHONE ENCOUNTER
Prescription approved per [facility] Refill Protocol.  Trina Bolaños RN, BSN  Message handled by CLINIC NURSE.

## 2021-10-15 ENCOUNTER — IMMUNIZATION (OUTPATIENT)
Dept: NURSING | Facility: CLINIC | Age: 66
End: 2021-10-15
Payer: COMMERCIAL

## 2021-10-15 PROCEDURE — 91300 PR COVID VAC PFIZER DIL RECON 30 MCG/0.3 ML IM: CPT

## 2021-10-15 PROCEDURE — 0004A PR COVID VAC PFIZER DIL RECON 30 MCG/0.3 ML IM: CPT

## 2022-01-03 ENCOUNTER — TRANSFERRED RECORDS (OUTPATIENT)
Dept: HEALTH INFORMATION MANAGEMENT | Facility: CLINIC | Age: 67
End: 2022-01-03
Payer: MEDICARE

## 2022-01-03 LAB
CREATININE (EXTERNAL): 0.93 MG/DL (ref 0.76–1.27)
GFR ESTIMATED (EXTERNAL): 85 ML/MIN/1.73
GFR ESTIMATED (IF AFRICAN AMERICAN) (EXTERNAL): 99 ML/MIN/1.73

## 2022-02-10 ENCOUNTER — TRANSFERRED RECORDS (OUTPATIENT)
Dept: HEALTH INFORMATION MANAGEMENT | Facility: CLINIC | Age: 67
End: 2022-02-10
Payer: COMMERCIAL

## 2022-03-12 DIAGNOSIS — K21.9 GASTROESOPHAGEAL REFLUX DISEASE WITHOUT ESOPHAGITIS: ICD-10-CM

## 2022-03-12 DIAGNOSIS — R07.89 OTHER CHEST PAIN: ICD-10-CM

## 2022-03-15 RX ORDER — ATORVASTATIN CALCIUM 40 MG/1
TABLET, FILM COATED ORAL
Qty: 90 TABLET | Refills: 1 | Status: SHIPPED | OUTPATIENT
Start: 2022-03-15 | End: 2022-09-07

## 2022-03-15 RX ORDER — FAMOTIDINE 40 MG/1
40 TABLET, FILM COATED ORAL 2 TIMES DAILY PRN
Qty: 180 TABLET | Refills: 1 | Status: SHIPPED | OUTPATIENT
Start: 2022-03-15 | End: 2022-09-29

## 2022-07-02 DIAGNOSIS — E89.0 POSTOPERATIVE HYPOTHYROIDISM: ICD-10-CM

## 2022-07-06 RX ORDER — LEVOTHYROXINE SODIUM 150 UG/1
TABLET ORAL
Qty: 90 TABLET | Refills: 0 | Status: SHIPPED | OUTPATIENT
Start: 2022-07-06 | End: 2022-10-02

## 2022-07-06 NOTE — TELEPHONE ENCOUNTER
Prescription approved per [facility] Refill Protocol.  Trina Bolaños RN, BSN  [facility]

## 2022-09-04 DIAGNOSIS — R07.89 OTHER CHEST PAIN: ICD-10-CM

## 2022-09-07 RX ORDER — ATORVASTATIN CALCIUM 40 MG/1
TABLET, FILM COATED ORAL
Qty: 90 TABLET | Refills: 3 | Status: SHIPPED | OUTPATIENT
Start: 2022-09-07 | End: 2023-09-06

## 2022-09-07 NOTE — TELEPHONE ENCOUNTER
Routing refill request to provider for review/approval because:  Labs not current:  LDL    Abby Hassan RN

## 2022-09-26 DIAGNOSIS — E89.0 POSTOPERATIVE HYPOTHYROIDISM: ICD-10-CM

## 2022-09-26 DIAGNOSIS — I10 ESSENTIAL HYPERTENSION: ICD-10-CM

## 2022-09-26 SDOH — ECONOMIC STABILITY: FOOD INSECURITY: WITHIN THE PAST 12 MONTHS, THE FOOD YOU BOUGHT JUST DIDN'T LAST AND YOU DIDN'T HAVE MONEY TO GET MORE.: NEVER TRUE

## 2022-09-26 SDOH — ECONOMIC STABILITY: TRANSPORTATION INSECURITY
IN THE PAST 12 MONTHS, HAS THE LACK OF TRANSPORTATION KEPT YOU FROM MEDICAL APPOINTMENTS OR FROM GETTING MEDICATIONS?: NO

## 2022-09-26 SDOH — HEALTH STABILITY: PHYSICAL HEALTH: ON AVERAGE, HOW MANY MINUTES DO YOU ENGAGE IN EXERCISE AT THIS LEVEL?: 30 MIN

## 2022-09-26 SDOH — ECONOMIC STABILITY: FOOD INSECURITY: WITHIN THE PAST 12 MONTHS, YOU WORRIED THAT YOUR FOOD WOULD RUN OUT BEFORE YOU GOT MONEY TO BUY MORE.: NEVER TRUE

## 2022-09-26 SDOH — HEALTH STABILITY: PHYSICAL HEALTH: ON AVERAGE, HOW MANY DAYS PER WEEK DO YOU ENGAGE IN MODERATE TO STRENUOUS EXERCISE (LIKE A BRISK WALK)?: 2 DAYS

## 2022-09-26 SDOH — ECONOMIC STABILITY: INCOME INSECURITY: IN THE LAST 12 MONTHS, WAS THERE A TIME WHEN YOU WERE NOT ABLE TO PAY THE MORTGAGE OR RENT ON TIME?: NO

## 2022-09-26 SDOH — ECONOMIC STABILITY: TRANSPORTATION INSECURITY
IN THE PAST 12 MONTHS, HAS LACK OF TRANSPORTATION KEPT YOU FROM MEETINGS, WORK, OR FROM GETTING THINGS NEEDED FOR DAILY LIVING?: NO

## 2022-09-26 SDOH — ECONOMIC STABILITY: INCOME INSECURITY: HOW HARD IS IT FOR YOU TO PAY FOR THE VERY BASICS LIKE FOOD, HOUSING, MEDICAL CARE, AND HEATING?: NOT HARD AT ALL

## 2022-09-26 ASSESSMENT — SOCIAL DETERMINANTS OF HEALTH (SDOH)
HOW OFTEN DO YOU GET TOGETHER WITH FRIENDS OR RELATIVES?: ONCE A WEEK
DO YOU BELONG TO ANY CLUBS OR ORGANIZATIONS SUCH AS CHURCH GROUPS UNIONS, FRATERNAL OR ATHLETIC GROUPS, OR SCHOOL GROUPS?: PATIENT DECLINED
IN A TYPICAL WEEK, HOW MANY TIMES DO YOU TALK ON THE PHONE WITH FAMILY, FRIENDS, OR NEIGHBORS?: TWICE A WEEK
HOW OFTEN DO YOU ATTEND CHURCH OR RELIGIOUS SERVICES?: PATIENT DECLINED

## 2022-09-26 ASSESSMENT — ENCOUNTER SYMPTOMS
HEMATURIA: 0
HEADACHES: 0
EYE PAIN: 0
NERVOUS/ANXIOUS: 0
ARTHRALGIAS: 0
JOINT SWELLING: 0
CHILLS: 0
PALPITATIONS: 0
HEMATOCHEZIA: 0
WEAKNESS: 1
PARESTHESIAS: 0
HEARTBURN: 0
DYSURIA: 0
NAUSEA: 0
FEVER: 0
SORE THROAT: 0
SHORTNESS OF BREATH: 1
FREQUENCY: 0
DIZZINESS: 0
COUGH: 0
CONSTIPATION: 0
MYALGIAS: 1
DIARRHEA: 0
ABDOMINAL PAIN: 0

## 2022-09-26 ASSESSMENT — LIFESTYLE VARIABLES
HOW OFTEN DO YOU HAVE SIX OR MORE DRINKS ON ONE OCCASION: NEVER
HOW OFTEN DO YOU HAVE A DRINK CONTAINING ALCOHOL: MONTHLY OR LESS
AUDIT-C TOTAL SCORE: -1
SKIP TO QUESTIONS 9-10: 0
HOW MANY STANDARD DRINKS CONTAINING ALCOHOL DO YOU HAVE ON A TYPICAL DAY: PATIENT DECLINED

## 2022-09-26 ASSESSMENT — ACTIVITIES OF DAILY LIVING (ADL): CURRENT_FUNCTION: NO ASSISTANCE NEEDED

## 2022-09-27 RX ORDER — LISINOPRIL 10 MG/1
TABLET ORAL
Qty: 90 TABLET | Refills: 3 | OUTPATIENT
Start: 2022-09-27

## 2022-09-27 RX ORDER — LEVOTHYROXINE SODIUM 150 UG/1
TABLET ORAL
Qty: 90 TABLET | Refills: 0 | OUTPATIENT
Start: 2022-09-27

## 2022-09-27 NOTE — TELEPHONE ENCOUNTER
Patient has enough medication to last until scheduled appointment on 9/29/22. Refusing refill request and closing encounter.     Radha Chambers RN on 9/27/2022 at 5:45 PM

## 2022-09-29 ENCOUNTER — OFFICE VISIT (OUTPATIENT)
Dept: PEDIATRICS | Facility: CLINIC | Age: 67
End: 2022-09-29
Payer: COMMERCIAL

## 2022-09-29 VITALS
WEIGHT: 261.1 LBS | HEART RATE: 55 BPM | HEIGHT: 69 IN | SYSTOLIC BLOOD PRESSURE: 112 MMHG | RESPIRATION RATE: 16 BRPM | TEMPERATURE: 96.8 F | DIASTOLIC BLOOD PRESSURE: 80 MMHG | OXYGEN SATURATION: 98 % | BODY MASS INDEX: 38.67 KG/M2

## 2022-09-29 DIAGNOSIS — K50.00 CROHN'S DISEASE OF SMALL INTESTINE WITHOUT COMPLICATION (H): ICD-10-CM

## 2022-09-29 DIAGNOSIS — K21.9 GASTROESOPHAGEAL REFLUX DISEASE WITHOUT ESOPHAGITIS: ICD-10-CM

## 2022-09-29 DIAGNOSIS — M54.2 NECK PAIN: ICD-10-CM

## 2022-09-29 DIAGNOSIS — Z00.00 ENCOUNTER FOR MEDICARE ANNUAL WELLNESS EXAM: Primary | ICD-10-CM

## 2022-09-29 DIAGNOSIS — Z12.5 ENCOUNTER FOR SCREENING FOR MALIGNANT NEOPLASM OF PROSTATE: ICD-10-CM

## 2022-09-29 DIAGNOSIS — I10 ESSENTIAL HYPERTENSION: ICD-10-CM

## 2022-09-29 DIAGNOSIS — E66.01 MORBID OBESITY (H): ICD-10-CM

## 2022-09-29 DIAGNOSIS — E89.0 POSTOPERATIVE HYPOTHYROIDISM: ICD-10-CM

## 2022-09-29 DIAGNOSIS — G71.09 HEREDITARY PROGRESSIVE MUSCULAR DYSTROPHY (H): ICD-10-CM

## 2022-09-29 DIAGNOSIS — Z80.42 FAMILY HISTORY OF PROSTATE CANCER: ICD-10-CM

## 2022-09-29 LAB
ALBUMIN SERPL-MCNC: 3.5 G/DL (ref 3.4–5)
ALP SERPL-CCNC: 67 U/L (ref 40–150)
ALT SERPL W P-5'-P-CCNC: 24 U/L (ref 0–70)
ANION GAP SERPL CALCULATED.3IONS-SCNC: 6 MMOL/L (ref 3–14)
AST SERPL W P-5'-P-CCNC: 18 U/L (ref 0–45)
BASOPHILS # BLD AUTO: 0 10E3/UL (ref 0–0.2)
BASOPHILS NFR BLD AUTO: 0 %
BILIRUB SERPL-MCNC: 0.5 MG/DL (ref 0.2–1.3)
BUN SERPL-MCNC: 12 MG/DL (ref 7–30)
CALCIUM SERPL-MCNC: 8.4 MG/DL (ref 8.5–10.1)
CHLORIDE BLD-SCNC: 108 MMOL/L (ref 94–109)
CHOLEST SERPL-MCNC: 95 MG/DL
CO2 SERPL-SCNC: 24 MMOL/L (ref 20–32)
CREAT SERPL-MCNC: 0.71 MG/DL (ref 0.66–1.25)
EOSINOPHIL # BLD AUTO: 0.4 10E3/UL (ref 0–0.7)
EOSINOPHIL NFR BLD AUTO: 4 %
ERYTHROCYTE [DISTWIDTH] IN BLOOD BY AUTOMATED COUNT: 12.7 % (ref 10–15)
FASTING STATUS PATIENT QL REPORTED: YES
GFR SERPL CREATININE-BSD FRML MDRD: >90 ML/MIN/1.73M2
GLUCOSE BLD-MCNC: 114 MG/DL (ref 70–99)
HCT VFR BLD AUTO: 43.5 % (ref 40–53)
HDLC SERPL-MCNC: 38 MG/DL
HGB BLD-MCNC: 14.7 G/DL (ref 13.3–17.7)
LDLC SERPL CALC-MCNC: 39 MG/DL
LYMPHOCYTES # BLD AUTO: 1.3 10E3/UL (ref 0.8–5.3)
LYMPHOCYTES NFR BLD AUTO: 15 %
MCH RBC QN AUTO: 29.8 PG (ref 26.5–33)
MCHC RBC AUTO-ENTMCNC: 33.8 G/DL (ref 31.5–36.5)
MCV RBC AUTO: 88 FL (ref 78–100)
MONOCYTES # BLD AUTO: 0.7 10E3/UL (ref 0–1.3)
MONOCYTES NFR BLD AUTO: 9 %
NEUTROPHILS # BLD AUTO: 6.1 10E3/UL (ref 1.6–8.3)
NEUTROPHILS NFR BLD AUTO: 72 %
NONHDLC SERPL-MCNC: 57 MG/DL
PLATELET # BLD AUTO: 230 10E3/UL (ref 150–450)
POTASSIUM BLD-SCNC: 3.9 MMOL/L (ref 3.4–5.3)
PROT SERPL-MCNC: 7.2 G/DL (ref 6.8–8.8)
PSA SERPL-MCNC: 0.6 UG/L (ref 0–4)
RBC # BLD AUTO: 4.93 10E6/UL (ref 4.4–5.9)
SODIUM SERPL-SCNC: 138 MMOL/L (ref 133–144)
TRIGL SERPL-MCNC: 88 MG/DL
TSH SERPL DL<=0.005 MIU/L-ACNC: 2.86 MU/L (ref 0.4–4)
WBC # BLD AUTO: 8.4 10E3/UL (ref 4–11)

## 2022-09-29 PROCEDURE — G0439 PPPS, SUBSEQ VISIT: HCPCS | Performed by: INTERNAL MEDICINE

## 2022-09-29 PROCEDURE — 36415 COLL VENOUS BLD VENIPUNCTURE: CPT | Performed by: INTERNAL MEDICINE

## 2022-09-29 PROCEDURE — 80050 GENERAL HEALTH PANEL: CPT | Performed by: INTERNAL MEDICINE

## 2022-09-29 PROCEDURE — 0124A COVID-19,PF,PFIZER BOOSTER BIVALENT: CPT | Performed by: INTERNAL MEDICINE

## 2022-09-29 PROCEDURE — 90662 IIV NO PRSV INCREASED AG IM: CPT | Performed by: INTERNAL MEDICINE

## 2022-09-29 PROCEDURE — 90471 IMMUNIZATION ADMIN: CPT | Performed by: INTERNAL MEDICINE

## 2022-09-29 PROCEDURE — 91312 COVID-19,PF,PFIZER BOOSTER BIVALENT: CPT | Performed by: INTERNAL MEDICINE

## 2022-09-29 PROCEDURE — G0103 PSA SCREENING: HCPCS | Performed by: INTERNAL MEDICINE

## 2022-09-29 PROCEDURE — 80061 LIPID PANEL: CPT | Performed by: INTERNAL MEDICINE

## 2022-09-29 RX ORDER — LEVOTHYROXINE SODIUM 150 UG/1
150 TABLET ORAL DAILY
Qty: 90 TABLET | Refills: 0 | Status: CANCELLED | OUTPATIENT
Start: 2022-09-29

## 2022-09-29 RX ORDER — LISINOPRIL 10 MG/1
10 TABLET ORAL DAILY
Qty: 90 TABLET | Refills: 3 | Status: SHIPPED | OUTPATIENT
Start: 2022-09-29 | End: 2023-09-19

## 2022-09-29 RX ORDER — FAMOTIDINE 40 MG/1
40 TABLET, FILM COATED ORAL 2 TIMES DAILY PRN
Qty: 180 TABLET | Refills: 3 | Status: SHIPPED | OUTPATIENT
Start: 2022-09-29 | End: 2023-10-02

## 2022-09-29 ASSESSMENT — ENCOUNTER SYMPTOMS
FREQUENCY: 0
ARTHRALGIAS: 0
DYSURIA: 0
HEMATOCHEZIA: 0
HEARTBURN: 0
MYALGIAS: 1
PARESTHESIAS: 0
PALPITATIONS: 0
DIARRHEA: 0
HEADACHES: 0
HEMATURIA: 0
COUGH: 0
JOINT SWELLING: 0
SORE THROAT: 0
CONSTIPATION: 0
WEAKNESS: 1
EYE PAIN: 0
CHILLS: 0
NERVOUS/ANXIOUS: 0
DIZZINESS: 0
FEVER: 0
ABDOMINAL PAIN: 0
NAUSEA: 0

## 2022-09-29 ASSESSMENT — ACTIVITIES OF DAILY LIVING (ADL): CURRENT_FUNCTION: NO ASSISTANCE NEEDED

## 2022-09-29 NOTE — PROGRESS NOTES
"SUBJECTIVE:   Reggie is a 67 year old who presents for Preventive Visit.  Patient has been advised of split billing requirements and indicates understanding: Yes  Are you in the first 12 months of your Medicare coverage?  No    Healthy Habits:     In general, how would you rate your overall health?  Fair    Frequency of exercise:  2-3 days/week    Duration of exercise:  Less than 15 minutes    Do you usually eat at least 4 servings of fruit and vegetables a day, include whole grains    & fiber and avoid regularly eating high fat or \"junk\" foods?  Yes    Taking medications regularly:  Yes    Medication side effects:  None    Ability to successfully perform activities of daily living:  No assistance needed    Home Safety:  No safety concerns identified    Hearing Impairment:  Difficulty following a conversation in a noisy restaurant or crowded room, feel that people are mumbling or not speaking clearly and need to ask people to speak up or repeat themselves    In the past 6 months, have you been bothered by leaking of urine?  No    In general, how would you rate your overall mental or emotional health?  Good      PHQ-2 Total Score: 2    Additional concerns today:  Yes       Do you feel safe in your environment? Yes  Have you ever done Advance Care Planning? (For example, a Health Directive, POLST, or a discussion with a medical provider or your loved ones about your wishes): No, advance care planning information given to patient to review.  Patient declined advance care planning discussion at this time.     Fall risk  Fallen 2 or more times in the past year?: No  Any fall with injury in the past year?: No    Cognitive Screening   1) Repeat 3 items (Leader, Season, Table)    2) Clock draw: Unable to draw a clock due to hand issues - this part of the test was not performed   3) 3 item recall: Recalls 3 objects  Results: All three words recalled - unable to draw clock, cognitive impairment less likely     Mini-CogTM " Copyright VERNON Huynh. Licensed by the author for use in [facility]; reprinted with permission (yvette@[facility]). All rights reserved.          Reviewed and updated as needed this visit by clinical staff   Tobacco   Meds   Med Hx  Surg Hx  Fam Hx  Soc Hx          Reviewed and updated as needed this visit by Provider                   Social History     Tobacco Use     Smoking status: Former Smoker     Packs/day: 0.00     Years: 40.00     Pack years: 0.00     Types: Cigarettes     Quit date: 2020     Years since quittin.6     Smokeless tobacco: Never Used   Substance Use Topics     Alcohol use: Yes     Comment: rare         Alcohol Use 2022   Prescreen: >3 drinks/day or >7 drinks/week? No   Prescreen: >3 drinks/day or >7 drinks/week? -             Current providers sharing in care for this patient include:   Patient Care Team:  Vinod Chris MD as PCP - General  Vinod Chris MD as Assigned PCP    The following health maintenance items are reviewed in Epic and correct as of today:  Health Maintenance   Topic Date Due     LUNG CANCER SCREENING  2022     TSH W/FREE T4 REFLEX  2022     ANNUAL REVIEW OF HM ORDERS  2022     COLORECTAL CANCER SCREENING  2023     MEDICARE ANNUAL WELLNESS VISIT  2023     FALL RISK ASSESSMENT  2023     LIPID  2026     ADVANCE CARE PLANNING  2026     DTAP/TDAP/TD IMMUNIZATION (3 - Td or Tdap) 2027     HEPATITIS C SCREENING  Completed     PHQ-2 (once per calendar year)  Completed     Pneumococcal Vaccine: 65+ Years  Completed     ZOSTER IMMUNIZATION  Completed     AORTIC ANEURYSM SCREENING (SYSTEM ASSIGNED)  Completed     COVID-19 Vaccine  Completed     IPV IMMUNIZATION  Aged Out     MENINGITIS IMMUNIZATION  Aged Out     HEPATITIS B IMMUNIZATION  Aged Out     INFLUENZA VACCINE  Discontinued     Patient Active Problem List   Diagnosis     Hereditary progressive muscular dystrophy (H)     Tinnitus      CARDIOVASCULAR SCREENING; LDL GOAL LESS THAN 130     Health Care Home     Advanced directives, counseling/discussion     Family history of prostate cancer     Postoperative hypothyroidism     Gastroesophageal reflux disease without esophagitis     Crohn's disease of small intestine without complication (H)     Morbid obesity (H)     Past Surgical History:   Procedure Laterality Date     ABDOMEN SURGERY  1970     APPENDECTOMY       APPENDECTOMY       BIOPSY  2021     CHOLECYSTECTOMY       CHOLECYSTECTOMY       COLONOSCOPY  2013    Colonoscopy Dr. Pruitt [facility]     COLONOSCOPY  2013    Procedure: COLONOSCOPY;  Colonoscopy ;  Surgeon: Vinod Pruitt MD;  Location:  GI     ENT SURGERY      T&A as a child     HC THYROID LOBECTOMY,UNILAT  1990     HC THYROID LOBECTOMY,UNILAT  2008    resection goiter     THYROIDECTOMY       TONSILLECTOMY         Social History     Tobacco Use     Smoking status: Former Smoker     Packs/day: 0.00     Years: 40.00     Pack years: 0.00     Types: Cigarettes     Quit date: 2020     Years since quittin.6     Smokeless tobacco: Never Used   Substance Use Topics     Alcohol use: Yes     Comment: rare     Family History   Problem Relation Age of Onset     Heart Disease Mother      Cancer - colorectal Mother      Alzheimer Disease Father      Neurologic Disorder Father      Gastrointestinal Disease Brother         colon polyps     Diabetes Sister      Diabetes Brother      Other Cancer Sister         melanoma     Anxiety Disorder Daughter          Current Outpatient Medications   Medication Sig Dispense Refill     atorvastatin (LIPITOR) 40 MG tablet TAKE 1 TABLET BY MOUTH EVERY DAY IN THE EVENING 90 tablet 3     famotidine (PEPCID) 40 MG tablet Take 1 tablet (40 mg) by mouth 2 times daily as needed for heartburn 180 tablet 3     fexofenadine (ALLEGRA) 180 MG tablet Take 180 mg by mouth daily as needed for allergies       levothyroxine  "(SYNTHROID/LEVOTHROID) 150 MCG tablet TAKE 1 TABLET BY MOUTH EVERY DAY 90 tablet 0     lisinopril (ZESTRIL) 10 MG tablet Take 1 tablet (10 mg) by mouth daily 90 tablet 3     mesalamine ER (PENTASA) 500 MG CR capsule Take 2,000 mg by mouth 2 times daily           Review of Systems   Constitutional: Negative for chills and fever.   HENT: Negative for congestion, ear pain, hearing loss and sore throat.    Eyes: Negative for pain and visual disturbance.   Respiratory: Negative for cough.    Cardiovascular: Negative for chest pain, palpitations and peripheral edema.   Gastrointestinal: Negative for abdominal pain, constipation, diarrhea, heartburn, hematochezia and nausea.   Genitourinary: Positive for impotence. Negative for dysuria, frequency, genital sores, hematuria, penile discharge and urgency.   Musculoskeletal: Positive for myalgias. Negative for arthralgias and joint swelling.   Skin: Negative for rash.   Neurological: Positive for weakness. Negative for dizziness, headaches and paresthesias.   Psychiatric/Behavioral: Negative for mood changes. The patient is not nervous/anxious.          OBJECTIVE:   /80 (BP Location: Right arm, Patient Position: Sitting, Cuff Size: Adult Regular)   Pulse 55   Temp 96.8  F (36  C) (Tympanic)   Resp 16   Ht 1.763 m (5' 9.41\")   Wt 118.4 kg (261 lb 1.6 oz)   SpO2 98%   BMI 38.10 kg/m   Estimated body mass index is 38.1 kg/m  as calculated from the following:    Height as of this encounter: 1.763 m (5' 9.41\").    Weight as of this encounter: 118.4 kg (261 lb 1.6 oz).  Physical Exam  GENERAL: healthy, alert and no distress  EYES: Eyes grossly normal to inspection, PERRL and conjunctivae and sclerae normal  HENT: ear canals and TM's normal, nose and mouth without ulcers or lesions  NECK: no adenopathy, no asymmetry, masses, or scars and thyroid normal to palpation  RESP: lungs clear to auscultation - no rales, rhonchi or wheezes  CV: regular rate and rhythm, normal S1 " S2, no S3 or S4, no murmur, click or rub, no peripheral edema and peripheral pulses strong  ABDOMEN: soft, nontender, no hepatosplenomegaly, no masses and bowel sounds normal  MS:upper extremity atrophic changes  SKIN: no suspicious lesions or rashes  NEURO: b/l upper extremity neuromuscular atrophy, generalized weakness forearms and digits 4/5  PSYCH: mentation appears normal, affect normal/bright    ASSESSMENT / PLAN:   (Z00.00) Encounter for Medicare annual wellness exam  (primary encounter diagnosis)    (G71.09) Hereditary progressive muscular dystrophy (H)  Comment:   Plan: upper extremity weakness primarily, slight progression since last visit    (K50.00) Crohn's disease of small intestine without complication (H)  Comment: Adequate control.  Continue Pentasa, managed by GI  Plan: CBC with platelets and differential          (E89.0) Postoperative hypothyroidism  Comment:   Plan: TSH WITH FREE T4 REFLEX          (I10) Essential hypertension  Comment: Adequate control.  Continue current medication.   Plan: lisinopril (ZESTRIL) 10 MG tablet, Lipid panel         reflex to direct LDL Fasting, Comprehensive         metabolic panel (BMP + Alb, Alk Phos, ALT, AST,        Total. Bili, TP)          (M54.2) Neck pain  Comment: neck soreness without radicular symptoms [place], recommend course of PT  Plan: Physical Therapy Referral          (K21.9) Gastroesophageal reflux disease without esophagitis  Comment:   Plan: famotidine (PEPCID) 40 MG tablet          (E66.01) Morbid obesity (H)  Comment:   Plan: continues to work on diet, planning to purchase stationary bike    (Z80.42) Family history of prostate cancer       (Z12.5) Encounter for screening for malignant neoplasm of prostate   Comment:   Plan: PSA, screen              COUNSELING:  Reviewed preventive health counseling, as reflected in patient instructions       Regular exercise       Healthy diet/nutrition       Colon cancer  "screening       Prostate cancer screening           Estimated body mass index is 38.1 kg/m  as calculated from the following:    Height as of this encounter: 1.763 m (5' 9.41\").    Weight as of this encounter: 118.4 kg (261 lb 1.6 oz).    Weight management plan: Discussed healthy diet and exercise guidelines    He reports that he quit smoking about 2 years ago. His smoking use included cigarettes. He smoked 0.00 packs per day for 40.00 years. He has never used smokeless tobacco.      Appropriate preventive services were discussed with this patient, including applicable screening as appropriate for cardiovascular disease, diabetes, osteopenia/osteoporosis, and glaucoma.  As appropriate for age/gender, discussed screening for colorectal cancer, prostate cancer, breast cancer, and cervical cancer. Checklist reviewing preventive services available has been given to the patient.    Reviewed patients plan of care and provided an AVS. The Basic Care Plan (routine screening as documented in Health Maintenance) for Manuel meets the Care Plan requirement. This Care Plan has been established and reviewed with the Patient.    Counseling Resources:  ATP IV Guidelines  Pooled Cohorts Equation Calculator  Breast Cancer Risk Calculator  Breast Cancer: Medication to Reduce Risk  FRAX Risk Assessment  ICSI Preventive Guidelines  Dietary Guidelines for Americans, 2010  [employer] MyPlate  ASA Prophylaxis  Lung CA Screening    Vinod Chris MD  [facility]    Identified Health Risks:  "

## 2022-09-29 NOTE — PROGRESS NOTES
"    The patient was provided with suggestions to help him develop a healthy physical lifestyle.  Answers for HPI/ROS submitted by the patient on 9/26/2022  In general, how would you rate your overall physical health?: fair  Frequency of exercise:: 2-3 days/week  Do you usually eat at least 4 servings of fruit and vegetables a day, include whole grains & fiber, and avoid regularly eating high fat or \"junk\" foods? : Yes  Taking medications regularly:: Yes  Medication side effects:: None  Activities of Daily Living: no assistance needed  Home safety: no safety concerns identified  Hearing Impairment:: difficulty following a conversation in a noisy restaurant or crowded room, feel that people are mumbling or not speaking clearly, need to ask people to speak up or repeat themselves  In the past 6 months, have you been bothered by leaking of urine?: No  abdominal pain: No  Blood in stool: No  Blood in urine: No  chest pain: No  chills: No  congestion: No  constipation: No  cough: No  diarrhea: No  dizziness: No  ear pain: No  eye pain: No  nervous/anxious: No  fever: No  frequency: No  genital sores: No  headaches: No  hearing loss: No  heartburn: No  arthralgias: No  joint swelling: No  peripheral edema: No  mood changes: No  myalgias: Yes  nausea: No  dysuria: No  palpitations: No  Skin sensation changes: No  sore throat: No  urgency: No  rash: No  shortness of breath: Yes  visual disturbance: No  weakness: Yes  impotence: Yes  penile discharge: No  In general, how would you rate your overall mental or emotional health?: good  Additional concerns today:: Yes  Duration of exercise:: Less than 15 minutes        "

## 2022-09-29 NOTE — PATIENT INSTRUCTIONS
Patient Education   Personalized Prevention Plan  You are due for the preventive services outlined below.  Your care team is available to assist you in scheduling these services.  If you have already completed any of these items, please share that information with your care team to update in your medical record.  Health Maintenance Due   Topic Date Due     LUNG CANCER SCREENING  03/08/2022     COVID-19 Vaccine (5 - Booster for Pfizer series) 05/27/2022     Thyroid Function Lab  09/29/2022     ANNUAL REVIEW OF HM ORDERS  09/29/2022     Preventive Health Recommendations  See your health care provider every year to    Review health changes.     Discuss preventive care.      Review your medicines if your doctor has prescribed any.    Talk with your health care provider about whether you should have a test to screen for prostate cancer (PSA).    Every 3 years, have a diabetes test (fasting glucose). If you are at risk for diabetes, you should have this test more often.    Every 5 years, have a cholesterol test. Have this test more often if you are at risk for high cholesterol or heart disease.     Every 10 years, have a colonoscopy. Or, have a yearly FIT test (stool test). These exams will check for colon cancer.    Talk to with your health care provider about screening for Abdominal Aortic Aneurysm if you have a family history of AAA or have a history of smoking.    Shots:     Get a flu shot each year.     Get a tetanus shot every 10 years.     Talk to your doctor about your pneumonia vaccines. There are now two you should receive - Pneumovax (PPSV 23) and Prevnar (PCV 13).    Talk to your pharmacist about a shingles vaccine.     Talk to your doctor about the hepatitis B vaccine.    Nutrition:     Eat at least 5 servings of fruits and vegetables each day.     Eat whole-grain bread, whole-wheat pasta and brown rice instead of white grains and rice.     Get adequate Calcium and Vitamin D.     Lifestyle    Exercise for  at least 150 minutes a week (30 minutes a day, 5 days a week). This will help you control your weight and prevent disease.     Limit alcohol to one drink per day.     No smoking.     Wear sunscreen to prevent skin cancer.     See your dentist every six months for an exam and cleaning.     See your eye doctor every 1 to 2 years to screen for conditions such as glaucoma, macular degeneration and cataracts.    Personalized Prevention Plan  You are due for the preventive services outlined below.  Your care team is available to assist you in scheduling these services.  If you have already completed any of these items, please share that information with your care team to update in your medical record.  Health Maintenance   Topic Date Due     LUNG CANCER SCREENING  03/08/2022     COVID-19 Vaccine (5 - Booster for Pfizer series) 05/27/2022     TSH W/FREE T4 REFLEX  09/29/2022     ANNUAL REVIEW OF HM ORDERS  09/29/2022     COLORECTAL CANCER SCREENING  08/03/2023     MEDICARE ANNUAL WELLNESS VISIT  09/29/2023     FALL RISK ASSESSMENT  09/29/2023     LIPID  04/05/2026     ADVANCE CARE PLANNING  09/29/2026     DTAP/TDAP/TD IMMUNIZATION (3 - Td or Tdap) 06/13/2027     HEPATITIS C SCREENING  Completed     PHQ-2 (once per calendar year)  Completed     Pneumococcal Vaccine: 65+ Years  Completed     ZOSTER IMMUNIZATION  Completed     AORTIC ANEURYSM SCREENING (SYSTEM ASSIGNED)  Completed     IPV IMMUNIZATION  Aged Out     MENINGITIS IMMUNIZATION  Aged Out     HEPATITIS B IMMUNIZATION  Aged Out     INFLUENZA VACCINE  Discontinued     Your Health Risk Assessment indicates you feel you are not in good health    A healthy lifestyle helps keep the body fit and the mind alert. It helps protect you from disease, helps you fight disease, and helps prevent chronic disease (disease that doesn't go away) from getting worse. This is important as you get older and begin to notice twinges in muscles and joints and a decline in the strength and  stamina you once took for granted. A healthy lifestyle includes good healthcare, good nutrition, weight control, recreation, and regular exercise. Avoid harmful substances and do what you can to keep safe. Another part of a healthy lifestyle is stay mentally active and socially involved.    Good healthcare     Have a wellness visit every year.     If you have new symptoms, let us know right away. Don't wait until the next checkup.     Take medicines exactly as prescribed and keep your medicines in a safe place. Tell us if your medicine causes problems.   Healthy diet and weight control     Eat 3 or 4 small, nutritious, low-fat, high-fiber meals a day. Include a variety of fruits, vegetables, and whole-grain foods.     Make sure you get enough calcium in your diet. Calcium, vitamin D, and exercise help prevent osteoporosis (bone thinning).     If you live alone, try eating with others when you can. That way you get a good meal and have company while you eat it.     Try to keep a healthy weight. If you eat more calories than your body uses for energy, it will be stored as fat and you will gain weight.     Recreation   Recreation is not limited to sports and team events. It includes any activity that provides relaxation, interest, enjoyment, and exercise. Recreation provides an outlet for physical, mental, and social energy. It can give a sense of worth and achievement. It can help you stay healthy.    Mental Exercise and Social Involvement  Mental and emotional health is as important as physical health. Keep in touch with friends and family. Stay as active as possible. Continue to learn and challenge yourself.   Things you can do to stay mentally active are:    Learn something new, like a foreign language or musical instrument.     Play SCRABBLE or do crossword puzzles. If you cannot find people to play these games with you at home, you can play them with others on your computer through the Internet.     Join a games  club--anything from card games to chess or checkers or lawn bowling.     Start a new hobby.     Go back to school.     Volunteer.     Read.   Keep up with world events.

## 2022-10-02 DIAGNOSIS — R73.03 PREDIABETES: ICD-10-CM

## 2022-10-02 DIAGNOSIS — E89.0 POSTOPERATIVE HYPOTHYROIDISM: ICD-10-CM

## 2022-10-02 RX ORDER — LEVOTHYROXINE SODIUM 150 UG/1
150 TABLET ORAL DAILY
Qty: 90 TABLET | Refills: 3 | Status: SHIPPED | OUTPATIENT
Start: 2022-10-02 | End: 2023-09-19

## 2022-10-24 ENCOUNTER — HOSPITAL ENCOUNTER (OUTPATIENT)
Dept: PHYSICAL THERAPY | Facility: REHABILITATION | Age: 67
Discharge: HOME | End: 2022-10-24
Attending: INTERNAL MEDICINE
Payer: COMMERCIAL

## 2022-10-24 DIAGNOSIS — M54.2 NECK PAIN: ICD-10-CM

## 2022-10-24 PROCEDURE — 97140 MANUAL THERAPY 1/> REGIONS: CPT | Mod: GP | Performed by: PHYSICAL THERAPIST

## 2022-10-24 PROCEDURE — 97161 PT EVAL LOW COMPLEX 20 MIN: CPT | Mod: GP | Performed by: PHYSICAL THERAPIST

## 2022-10-24 PROCEDURE — 97110 THERAPEUTIC EXERCISES: CPT | Mod: GP | Performed by: PHYSICAL THERAPIST

## 2022-10-27 NOTE — PROGRESS NOTES
[facility]    OUTPATIENT PHYSICAL THERAPY ORTHOPEDIC EVALUATION  PLAN OF TREATMENT FOR OUTPATIENT REHABILITATION  (COMPLETE FOR INITIAL CLAIMS ONLY)  Patient's Last Name, First Name, M.I.  YOB: 1955  Manuel Schofield    Provider s Name:  [facility]   Medical Record No.  [id number]   Start of Care Date:  10/24/22   Onset Date:  07/24/22   Type:     _X__PT   ___OT   ___SLP Medical Diagnosis:  Neck Pain     PT Diagnosis:  Neck Pain   Visits from SOC:  1      _________________________________________________________________________________  Plan of Treatment/Functional Goals:  joint mobilization, manual therapy, neuromuscular re-education, ROM, strengthening, stretching           Goals     Goal Description: Pt will be independent with his HEP for ongoing symptom managment in 12 weeks.          Goal Description: Pt will demo L cervical rotation at 50 deg with no c/o pain when checking blind spot in 12 weeks               Therapy Frequency:  1 time/week (1x/wk to every 1-2 weeks)  Predicted Duration of Therapy Intervention:  8 visits over 12 weeks    Franca Stephens, PT                 I CERTIFY THE NEED FOR THESE SERVICES FURNISHED UNDER        THIS PLAN OF TREATMENT AND WHILE UNDER MY CARE     (Physician co-signature of this document indicates review and certification of the therapy plan).                     Certification Date From:  10/24/22   Certification Date To:  01/19/23    Referring Provider:  Dr. Vinod Chris    Initial Assessment        See Epic Evaluation Start of Care Date: 10/24/22                10/24/22 1200   General Information   Type of Visit Initial OP Ortho PT Evaluation   Start of Care Date 10/24/22   Referring Physician Dr. Vinod Chris   Patient/Family Goals Statement To turn neck more without pain   Orders Evaluate and Treat   Date of Order 09/29/22  "  Certification Required? Yes   Medical Diagnosis Neck Pain   Surgical/Medical history reviewed Yes   Body Part(s)   Body Part(s) Cervical Spine   Presentation and Etiology   Pertinent history of current problem (include personal factors and/or comorbidities that impact the POC) Pt woke up with a stiff neck about 2-3 months ago and continues to have difficulty, especially with turning his head to the left.  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   Impairments A. Pain;D. Decreased ROM;E. Decreased flexibility   Functional Limitations perform activities of daily living   Symptom Location Left side of neck into L shoulder   How/Where did it occur From insidious onset   Onset date of current episode/exacerbation 07/24/22   Chronicity New   Pain rating (0-10 point scale) Best (/10);Worst (/10);Other   Best (/10) 2/10   Worst (/10) 9/10   Pain rating comment Current: 2/10   Pain quality C. Aching;A. Sharp  (Stiffness)   Frequency of pain/symptoms A. Constant   Pain/symptoms are: Worse in the morning   Pain/symptoms exacerbated by G. Certain positions  (Turning head to the left)   Pain/symptoms eased by C. Rest   Progression of symptoms since onset: Improved   Prior Level of Function   Functional Level Prior Comment Pt admits to have a sedentary lifestyle, \"I have 2 bad knees\"   Current Level of Function   Current Community Support Family/friend caregiver   Patient role/employment history F. Retired   Living environment House[facility]   Home/community accessibility No difficultu   Current equipment-Gait/Locomotion None   Current equipment-ADL None   Fall Risk Screen   Fall screen completed by PT   Have you fallen 2 or more times in the past year? No   Have you fallen and had an injury in the past year? No   Is patient a fall risk? No   Abuse Screen (yes response referral indicated)   Feels Unsafe at Home or Work/School no   Feels Threatened by " Someone no   Does Anyone Try to Keep You From Having Contact with Others or Doing Things Outside Your Home? no   Physical Signs of Abuse Present no   Patient needs abuse support services and resources No   Cervical Spine   Cervical Left Side Bending ROM 10 deg; painful   Cervical Right Rotation ROM 50 deg   Cervical Left Rotation ROM 35 deg   Cervical Flexion ROM 35 deg   Cervical Extension ROM 20 deg   Cervical Right Side Bending ROM 15 deg   Shoulder AROM Screen WNL   Shoulder/Wrist/Hand Strength Comments WNL   Upper Trapezius Flexibility Limited on L   Levator Scapula Flexibility Limited on L   Vertebral Artery Test -   Alar Ligament Test -   Transverse Ligament Test -   Segmental Mobility-Cervical Limited mid cervical mobility on L   Segmental Mobility-Thoracic Limited upper thoracic mobility   Palpation Increased tenderness with L upper trap   Planned Therapy Interventions   Planned Therapy Interventions joint mobilization;manual therapy;neuromuscular re-education;ROM;strengthening;stretching   Clinical Impression   Criteria for Skilled Therapeutic Interventions Met yes, treatment indicated   PT Diagnosis Neck Pain   Influenced by the following impairments Capsular impingement on L, L upper trap TP   Functional limitations due to impairments Difficulty turning head to the L to check blind spot, pain   Clinical Presentation Stable/Uncomplicated   Clinical Decision Making (Complexity) Low complexity   Therapy Frequency 1 time/week  (1x/wk to every 1-2 weeks)   Predicted Duration of Therapy Intervention (days/wks) 8 visits over 12 weeks   Risk & Benefits of therapy have been explained Yes   Patient, Family & other staff in agreement with plan of care Yes   Clinical Impression Comments Pt is a 66 yo male who's acute neck pain presents similar to a capsular entrapment pattern as well as a L upper trap TP and decreased upper thoracic mobility contributing to his decreased ROM.  Pt was treated at  with manual  therapy and able to improve L cervical rotation from 35 deg to 50 deg, same as the R side. Pt would benefit from skilled 1:1 PT to address his physical and functional impairments.   Education Assessment   Barriers to Learning No barriers   ORTHO GOALS   PT Ortho Eval Goals 1;2   Ortho Goal 1   Goal Description Pt will be independent with his HEP for ongoing symptom managment in 12 weeks.   Ortho Goal 2   Goal Description Pt will demo L cervical rotation at 50 deg with no c/o pain when checking blind spot in 12 weeks   Total Evaluation Time   PT Reginaldo, Low Complexity Minutes (38201) 20   Therapy Certification   Certification date from 10/24/22   Certification date to 01/19/23   Medical Diagnosis Neck Pain

## 2022-12-12 NOTE — PROGRESS NOTES
"ASSESSMENT & PLAN  Patient Instructions     1. Primary osteoarthritis of left knee      -Patient is following up for chronic left knee pain due to severe arthritis  -Patient tolerated cortisone injection today without complications.  Patient was given postprocedure instructions  -Patient was inquiring about viscosupplementation injections.  Patient will call us once pain is starting to return for a prior authorization order for Synvisc 1  -Patient was informed that he has bone-on-bone arthritis and will ultimately need a knee replacement surgery once treatments no longer provide relief  -Patient will follow up when pain returns  -Call direct clinic number [[phone]] at any time with questions or concerns.    Albert Yeo MD [facility] Orthopedics and Sports Medicine  [facility]          -----    SUBJECTIVE:  Manuel Schofield is a 67 year old male who is seen in follow-up for left knee pain.They were last seen 6/9/2020 with     Since their last visit with  in June 2020 reports 50% improvement. Pain is anteromedial knee. They indicate that their current pain level is 3/10. They have tried corticosteroid injection (most recent date: 6/9/2020) that provided 2.5 years(s) of relief. States corticosteriod injection helps with stability in his knee as well     The patient is seen by themselves.    Patient's past medical, surgical, social, and family histories were reviewed today and no changes are noted.    REVIEW OF SYSTEMS:  Constitutional: NEGATIVE for fever, chills, change in weight  Skin: NEGATIVE for worrisome rashes, moles or lesions  GI/: NEGATIVE for bowel or bladder changes  Neuro: NEGATIVE for weakness, dizziness or paresthesias    OBJECTIVE:  /78   Ht 1.763 m (5' 9.41\")   Wt 117.5 kg (259 lb)   BMI 37.80 kg/m     General: healthy, alert and in no distress  HEENT: no scleral icterus or conjunctival erythema  Skin: no suspicious lesions or rash. No jaundice.  CV: " regular rhythm by palpation, no pedal edema  Resp: normal respiratory effort without conversational dyspnea   Psych: normal mood and affect  Gait: normal steady gait with appropriate coordination and balance  Neuro: normal light touch sensory exam of the extremities.    MSK:  LEFT KNEE  Inspection:    normal alignment  Palpation:    Tender about the lateral joint line and medial joint line. Remainder of bony and ligamentous landmarks are nontender.    No effusion is present    Patellofemoral crepitus is Present  Range of Motion:     00 extension to 1350 flexion  Strength:    Extensor mechanism intact  Special Tests:    Negative: MCL/valgus stress (0 & 30 deg), LCL/varus stress (0 & 30 deg), Lachman's, posterior drawer, Adriana's    Independent visualization of the below image:  Large Joint Injection/Arthocentesis: L knee joint    Date/Time: 12/14/2022 9:46 AM  Performed by: Yeo, Albert, MD  Authorized by: Yeo, Albert, MD     Indications:  Pain and osteoarthritis  Needle Size:  22 G  Guidance: ultrasound    Approach:  Lateral  Location:  Knee      Medications:  40 mg methylPREDNISolone 40 MG/ML  Aspirate amount (mL):  5  Aspirate:  Yellow and serous  Outcome:  Tolerated well, no immediate complications  Procedure discussed: discussed risks, benefits, and alternatives    Consent Given by:  Patient  Prep: patient was prepped and draped in usual sterile fashion            Patient's conditions were thoroughly discussed during today's visit with total time spent face-to-face with the patient and documentation being 25 minutes.    Albert Yeo MD, [facility] Orthopedic [facility]

## 2022-12-14 ENCOUNTER — OFFICE VISIT (OUTPATIENT)
Dept: ORTHOPEDICS | Facility: CLINIC | Age: 67
End: 2022-12-14
Payer: COMMERCIAL

## 2022-12-14 VITALS
SYSTOLIC BLOOD PRESSURE: 130 MMHG | BODY MASS INDEX: 38.36 KG/M2 | WEIGHT: 259 LBS | DIASTOLIC BLOOD PRESSURE: 78 MMHG | HEIGHT: 69 IN

## 2022-12-14 DIAGNOSIS — M17.12 PRIMARY OSTEOARTHRITIS OF LEFT KNEE: Primary | ICD-10-CM

## 2022-12-14 PROCEDURE — 20611 DRAIN/INJ JOINT/BURSA W/US: CPT | Mod: LT | Performed by: FAMILY MEDICINE

## 2022-12-14 RX ORDER — METHYLPREDNISOLONE ACETATE 40 MG/ML
40 INJECTION, SUSPENSION INTRA-ARTICULAR; INTRALESIONAL; INTRAMUSCULAR; SOFT TISSUE
Status: SHIPPED | OUTPATIENT
Start: 2022-12-14

## 2022-12-14 RX ADMIN — METHYLPREDNISOLONE ACETATE 40 MG: 40 INJECTION, SUSPENSION INTRA-ARTICULAR; INTRALESIONAL; INTRAMUSCULAR; SOFT TISSUE at 09:46

## 2022-12-14 NOTE — PATIENT INSTRUCTIONS
1. Primary osteoarthritis of left knee      -Patient is following up for chronic left knee pain due to severe arthritis  -Patient tolerated cortisone injection today without complications.  Patient was given postprocedure instructions  -Patient was inquiring about viscosupplementation injections.  Patient will call us once pain is starting to return for a prior authorization order for Synvisc 1  -Patient was informed that he has bone-on-bone arthritis and will ultimately need a knee replacement surgery once treatments no longer provide relief  -Patient will follow up when pain returns  -Call direct clinic number [[phone]] at any time with questions or concerns.    Albert Yeo MD [facility]  [facility] [place]

## 2022-12-14 NOTE — LETTER
12/14/2022         RE: Manuel Schofield  [address]  [place] [address]        Dear Colleague,    Thank you for referring your patient, Manuel Schofield, to the [facility] [place]. Please see a copy of my visit note below.    ASSESSMENT & PLAN  Patient Instructions     1. Primary osteoarthritis of left knee      -Patient is following up for chronic left knee pain due to severe arthritis  -Patient tolerated cortisone injection today without complications.  Patient was given postprocedure instructions  -Patient was inquiring about viscosupplementation injections.  Patient will call us once pain is starting to return for a prior authorization order for Synvisc 1  -Patient was informed that he has bone-on-bone arthritis and will ultimately need a knee replacement surgery once treatments no longer provide relief  -Patient will follow up when pain returns  -Call direct clinic number [[phone]] at any time with questions or concerns.    Albert Yeo MD [facility] Orthopedics and Sports Medicine  [facility] [place]          -----    SUBJECTIVE:  Manuel Schofield is a 67 year old male who is seen in follow-up for left knee pain.They were last seen 6/9/2020 with     Since their last visit with  in June 2020 reports 50% improvement. Pain is anteromedial knee. They indicate that their current pain level is 3/10. They have tried corticosteroid injection (most recent date: 6/9/2020) that provided 2.5 years(s) of relief. States corticosteriod injection helps with stability in his knee as well     The patient is seen by themselves.    Patient's past medical, surgical, social, and family histories were reviewed today and no changes are noted.    REVIEW OF SYSTEMS:  Constitutional: NEGATIVE for fever, chills, change in weight  Skin: NEGATIVE for worrisome rashes, moles or lesions  GI/: NEGATIVE for bowel or bladder changes  Neuro: NEGATIVE for weakness,  "dizziness or paresthesias    OBJECTIVE:  /78   Ht 1.763 m (5' 9.41\")   Wt 117.5 kg (259 lb)   BMI 37.80 kg/m     General: healthy, alert and in no distress  HEENT: no scleral icterus or conjunctival erythema  Skin: no suspicious lesions or rash. No jaundice.  CV: regular rhythm by palpation, no pedal edema  Resp: normal respiratory effort without conversational dyspnea   Psych: normal mood and affect  Gait: normal steady gait with appropriate coordination and balance  Neuro: normal light touch sensory exam of the extremities.    MSK:  LEFT KNEE  Inspection:    normal alignment  Palpation:    Tender about the lateral joint line and medial joint line. Remainder of bony and ligamentous landmarks are nontender.    No effusion is present    Patellofemoral crepitus is Present  Range of Motion:     00 extension to 1350 flexion  Strength:    Extensor mechanism intact  Special Tests:    Negative: MCL/valgus stress (0 & 30 deg), LCL/varus stress (0 & 30 deg), Lachman's, posterior drawer, Adriana's    Independent visualization of the below image:  Large Joint Injection/Arthocentesis: L knee joint    Date/Time: 12/14/2022 9:46 AM  Performed by: Yeo, Albert, MD  Authorized by: Yeo, Albert, MD     Indications:  Pain and osteoarthritis  Needle Size:  22 G  Guidance: ultrasound    Approach:  Lateral  Location:  Knee      Medications:  40 mg methylPREDNISolone 40 MG/ML  Aspirate amount (mL):  5  Aspirate:  Yellow and serous  Outcome:  Tolerated well, no immediate complications  Procedure discussed: discussed risks, benefits, and alternatives    Consent Given by:  Patient  Prep: patient was prepped and draped in usual sterile fashion            Patient's conditions were thoroughly discussed during today's visit with total time spent face-to-face with the patient and documentation being 25 minutes.    Albert Yeo MD, [facility]            Again, thank you for allowing me to participate in the " care of your patient.        Sincerely,        Albert Yeo, MD

## 2022-12-15 ENCOUNTER — HOSPITAL ENCOUNTER (OUTPATIENT)
Dept: PHYSICAL THERAPY | Facility: REHABILITATION | Age: 67
Discharge: HOME | End: 2022-12-15
Payer: COMMERCIAL

## 2022-12-15 DIAGNOSIS — M54.2 NECK PAIN: Primary | ICD-10-CM

## 2022-12-15 PROCEDURE — 97110 THERAPEUTIC EXERCISES: CPT | Mod: GP | Performed by: PHYSICAL THERAPIST

## 2022-12-15 PROCEDURE — 97140 MANUAL THERAPY 1/> REGIONS: CPT | Mod: GP | Performed by: PHYSICAL THERAPIST

## 2023-03-15 ENCOUNTER — HOSPITAL ENCOUNTER (OUTPATIENT)
Dept: PHYSICAL THERAPY | Facility: REHABILITATION | Age: 68
Discharge: HOME | End: 2023-03-15
Payer: COMMERCIAL

## 2023-03-15 DIAGNOSIS — M54.2 NECK PAIN: Primary | ICD-10-CM

## 2023-03-15 PROCEDURE — 97140 MANUAL THERAPY 1/> REGIONS: CPT | Mod: GP | Performed by: PHYSICAL THERAPIST

## 2023-03-15 PROCEDURE — 97110 THERAPEUTIC EXERCISES: CPT | Mod: GP | Performed by: PHYSICAL THERAPIST

## 2023-03-15 NOTE — PROGRESS NOTES
[facility]    OUTPATIENT PHYSICAL THERAPY  PLAN OF TREATMENT FOR OUTPATIENT REHABILITATION AND PROGRESS NOTE           Patient's Last Name, First Name, Manuel Martinez Date of Birth  1955   Provider's Name  [facility] Medical Record No.  [id number]    Onset Date  7/24/22 Start of Care Date  10/24/22   Type:     _X_PT   ___OT   ___SLP Medical Diagnosis  Neck pain   PT Diagnosis  Neck pain Plan of Treatment  Frequency/Duration: 1x/week  Certification date from 1/19/23 to 4/18/23     Goals:  See below              I CERTIFY THE NEED FOR THESE SERVICES FURNISHED UNDER        THIS PLAN OF TREATMENT AND WHILE UNDER MY CARE     (Physician co-signature of this document indicates review and certification of the therapy plan).              Referring Provider: Dr. Vinod Chris         03/15/23 0700   Signing Clinician's Name / Credentials   Signing clinician's name / credentials Luis A Sanches, PT   Session Number   Session Number 3/8   Progress Note/Recertification   Recertification Due Date 04/18/23   Recertification Completed Date  03/15/23   Adult Goals   PT Ortho Eval Goals 1;2   Ortho Goal 1   Goal Description Pt will be independent with his HEP for ongoing symptom managment in 12 weeks.   Goal Progress Progressing, pt reports working on rotation left/right daily, but no other exercises   Target Date 04/18/23   Ortho Goal 2   Goal Description Pt will demo L cervical rotation at 50 deg with no c/o pain when checking blind spot in 12 weeks   Goal Progress Goal progressing, 46 deg with pain/pulling   Target Date 04/18/23   Subjective Report   Subjective Report Pt reports that his neck pain has been up/down, it is typically worse in the morning. Yesterday was a bad pain day, it was 8/10 pain and today it is better, 3-4/10. Left side is worse.   Objective Measures    Objective Measures Objective Measure 1   Objective Measure 1   Objective Measure Cervical AROM   Details ext: 12 deg with neck pain, flx 26 deg with pain/pulling in the neck, L side bending 10 deg, R side bending 9 deg with tightness/pain on left side, L rotation 46 deg with pain/tightness (improved to 51 deg at end of session, but still painful), R rotation 50 deg   Treatment Interventions   Interventions Therapeutic Procedure/Exercise;Manual Therapy   Therapeutic Procedure/exercise   Therapeutic Procedures: strength, endurance, ROM, flexibillity minutes (60910) 24   Skilled Intervention Assessment/review of HEP   Patient Response Unable to  with hands to progress strengthening well   Treatment Detail Assessment of HEP including chin tucks, cervical rotation and scap squeezes with cueing particularly needed for scap squeezes to avoid upper trap activation. Attempted SNAG rotation, but pt unable to  sheet/towel with hands to perform this; also unable to  theraband for scap rows/pull downs. Asked pt to improve consistency with HEP to determine benefit over the next several weeks. Updated POC and goals per recertification and return to therapy after nearly 3 months.   Manual Therapy   Manual Therapy: Mobilization, MFR, MLD, friction massage minutes (74431) 23   Skilled Intervention Joint mobs, STM   Patient Response Tolerated well, no pain sitting, but no change in pain when rotating the neck to the left   Treatment Detail Pt supine with legs on bolster; techniques to decrease pain and improve cervical mobility. Manual cervical traction with 30-45 sec holds, side glides left to right C 4-7 and physiologic side bending L C3-7 and physiologic rotation to the left, C4-7, grade III oscillations. STM to left cervical paraspinals and upper trap. In sitting, continued left upper trap STM with addition of contract-relax into left cervical side bending and left cervical rotation x 5 reps each.   Plan   Home  program Seated chin tucks, cervical rotation, levator scap stretch, bow and arrow, scap rows   Plan for next session Manual therapy, scap strengthening   Comments   Comments Pt had been seen for only 2 visits during initial certification period which  23 and pt returns now for 3rd visit 3 months after his last visit. He reports no change regarding his pain or cervical ROM limitations and continues to demonstrates limitations in all cervical spine AROM, though did show improvements from initial measurements that were made last fall (into left cervical rotation). He has not been consistent with working on HEP at home and he has not attended therapy consistently (reporting holidays, other appointments and weather affected his ability to attend), so it is not clear yet if therapy could help reduce his pain and improve his motion. Challenges with therapy include his inability to hold anything with his hands - this affects progression of upper quarter strengthening and his ability to do some cervical stretches. He would likely benefit from some imaging at this point and he will be contacting his primary physician about this. Will continue with the previous plan of care (8 visits total) and updating certification period for  23-23.   Total Session Time   Timed Code Treatment Minutes 47   Total Treatment Time (sum of timed and untimed services) 47   Medicare Claim Information   Medical Diagnosis Neck Pain   PT Diagnosis Neck Pain   Start of Care Date 10/24/22   Onset date of current episode/exacerbation 22   Certification date from 10/24/22     Luis A Sanches, PT, DPT, CLT  3/15/2023

## 2023-04-17 ENCOUNTER — HOSPITAL ENCOUNTER (OUTPATIENT)
Dept: PHYSICAL THERAPY | Facility: REHABILITATION | Age: 68
Discharge: HOME | End: 2023-04-17
Payer: COMMERCIAL

## 2023-04-17 DIAGNOSIS — M54.2 NECK PAIN: Primary | ICD-10-CM

## 2023-04-17 PROCEDURE — 97140 MANUAL THERAPY 1/> REGIONS: CPT | Mod: GP | Performed by: PHYSICAL THERAPIST

## 2023-04-17 PROCEDURE — 97110 THERAPEUTIC EXERCISES: CPT | Mod: GP | Performed by: PHYSICAL THERAPIST

## 2023-04-17 NOTE — PROGRESS NOTES
[facility]    OUTPATIENT PHYSICAL THERAPY  PLAN OF TREATMENT FOR OUTPATIENT REHABILITATION AND PROGRESS NOTE           Patient's Last Name, First Name, Manuel Martinez Date of Birth  1955   Provider's Name  [facility] Medical Record No.  [id number]    Onset Date  7/24/22 Start of Care Date  10/24/23   Type:     _X_PT   ___OT   ___SLP Medical Diagnosis  Neck pain   PT Diagnosis  Neck pain Plan of Treatment  Frequency/Duration: up to 1x/week (every 2-3 weeks planned)  Certification date from 4/18/23 to 7/16/23     Goals:  See below              I CERTIFY THE NEED FOR THESE SERVICES FURNISHED UNDER        THIS PLAN OF TREATMENT AND WHILE UNDER MY CARE     (Physician co-signature of this document indicates review and certification of the therapy plan).              Referring Provider: Vinod Chris MD          04/17/23 0700   Signing Clinician's Name / Credentials   Signing clinician's name / credentials Luis A Sanches PT   Session Number   Session Number 4/8   Progress Note/Recertification   Recertification Due Date 07/16/23   Recertification Completed Date  04/17/23   Adult Goals   PT Ortho Eval Goals 1;2   Ortho Goal 1   Goal Description Pt will be independent with his HEP for ongoing symptom managment in 12 weeks.   Goal Progress Progressing, pt now working on more of his HEP, but still not consistently enough   Target Date 07/16/23   Ortho Goal 2   Goal Description Pt will demo L cervical rotation at 50 deg with no c/o pain when checking blind spot in 12 weeks   Goal Progress Goal progressing, 45 deg with pain/pulling   Target Date 07/16/23   Subjective Report   Subjective Report Pt reports he is working on HEP every day. Today his neck feels pretty good, but it's still stiff. Some days has a lot of pain upon waking in the morning. Pain will get better as  the day goes on. Still limited in ROM.   Objective Measures   Objective Measures Objective Measure 1;Objective Measure 2   Objective Measure 1   Objective Measure Cervical AROM   Details R rotation 47 deg, L rotation 45 deg with pain turning to the left (rating left rotation pain 5/10)   Objective Measure 2   Objective Measure Joint mobility- cervical   Details Hypomobile ventral glides, side glides left and right, side bending and rotation throughout cervical spine   Treatment Interventions   Interventions Therapeutic Procedure/Exercise;Manual Therapy   Therapeutic Procedure/exercise   Therapeutic Procedures: strength, endurance, ROM, flexibillity minutes (23006) 24   Skilled Intervention Assessment/review of HEP   Patient Response muscle fatigue noted   Treatment Detail Exercises for upper trunk/cervical strengthening: Wall press with scap squeeze x 10-15, serratus wall slide x 10-15, B prone shoulder extension x 20, and B horizontal abduction x 10-15 (performed individually with one arm off edge of mat table and repeated on other side).   Manual Therapy   Manual Therapy: Mobilization, MFR, MLD, friction massage minutes (40791) 29   Skilled Intervention Joint mobs, STM   Patient Response No change in pain or stiffness at end of session   Treatment Detail Pt supine with legs on bolster; techniques to decrease pain and improve cervical mobility. Manual cervical traction with 30-45 sec holds, side glides left <> right C4-7 and physiologic side bending L C3-7 and physiologic rotation to the left, C4-7, grade III oscillations. STM to left cervical paraspinals and upper trap.   Plan   Home program Seated chin tucks, cervical rotation, levator scap stretch, bow and arrow, wall press   Plan for next session Manual therapy, scap strengthening   Comments   Comments Pt had been seen for only 1 visit from 1/9/23 to present (current reporting period; this is visit 4 of 8 for overall plan of care) and continues to report no  change in his cervical spine pain or motion; however, some days he has much less pain than other days. His cervical AROM into rotation is similar to one month prior. He has now started to work on HEP more, but encouraging more consistency with HEP and with PT appointments so that we can determine any change in symptoms. He is appropriate for x-ray at this time and may benefit from a referral to a spine specialist if therapy is unsuccessful. Updating certification period at this time for 4/18/23-7/16/23 to allow for original plan of care (8 visits total) to be completed.   Total Session Time   Timed Code Treatment Minutes 53   Total Treatment Time (sum of timed and untimed services) 53   Medicare Claim Information   Medical Diagnosis Neck Pain   PT Diagnosis Neck Pain   Start of Care Date 10/24/22   Onset date of current episode/exacerbation 07/24/22   Certification date from 04/18/23       Luis A Sanches, PT    4/17/2023

## 2023-04-20 ENCOUNTER — HOSPITAL ENCOUNTER (OUTPATIENT)
Dept: RADIOLOGY | Facility: CLINIC | Age: 68
Discharge: HOME | End: 2023-04-20
Attending: INTERNAL MEDICINE | Admitting: INTERNAL MEDICINE
Payer: COMMERCIAL

## 2023-04-20 DIAGNOSIS — M54.2 NECK PAIN: ICD-10-CM

## 2023-04-20 PROCEDURE — 72040 X-RAY EXAM NECK SPINE 2-3 VW: CPT

## 2023-04-21 ENCOUNTER — TRANSFERRED RECORDS (OUTPATIENT)
Dept: HEALTH INFORMATION MANAGEMENT | Facility: CLINIC | Age: 68
End: 2023-04-21
Payer: COMMERCIAL

## 2023-04-21 LAB
CREATININE (EXTERNAL): 0.85 MG/DL (ref 0.76–1.27)
GFR ESTIMATED (EXTERNAL): 95 ML/MIN/1.73
GFR ESTIMATED (IF AFRICAN AMERICAN) (EXTERNAL): NORMAL ML/MIN/1.73M2

## 2023-04-24 ENCOUNTER — HOSPITAL ENCOUNTER (OUTPATIENT)
Dept: PHYSICAL THERAPY | Facility: REHABILITATION | Age: 68
Discharge: HOME | End: 2023-04-24
Payer: COMMERCIAL

## 2023-04-24 DIAGNOSIS — M54.2 NECK PAIN: Primary | ICD-10-CM

## 2023-04-24 PROCEDURE — 97110 THERAPEUTIC EXERCISES: CPT | Mod: GP | Performed by: PHYSICAL THERAPIST

## 2023-04-24 PROCEDURE — 97140 MANUAL THERAPY 1/> REGIONS: CPT | Mod: GP | Performed by: PHYSICAL THERAPIST

## 2023-05-05 ENCOUNTER — TRANSFERRED RECORDS (OUTPATIENT)
Dept: HEALTH INFORMATION MANAGEMENT | Facility: CLINIC | Age: 68
End: 2023-05-05
Payer: COMMERCIAL

## 2023-05-08 ENCOUNTER — HOSPITAL ENCOUNTER (OUTPATIENT)
Dept: PHYSICAL THERAPY | Facility: REHABILITATION | Age: 68
Discharge: HOME | End: 2023-05-08
Payer: COMMERCIAL

## 2023-05-08 DIAGNOSIS — M54.2 NECK PAIN: Primary | ICD-10-CM

## 2023-05-08 PROCEDURE — 97140 MANUAL THERAPY 1/> REGIONS: CPT | Mod: GP | Performed by: PHYSICAL THERAPIST

## 2023-05-08 PROCEDURE — 97110 THERAPEUTIC EXERCISES: CPT | Mod: GP | Performed by: PHYSICAL THERAPIST

## 2023-05-15 ENCOUNTER — HOSPITAL ENCOUNTER (OUTPATIENT)
Dept: PHYSICAL THERAPY | Facility: REHABILITATION | Age: 68
Discharge: HOME | End: 2023-05-15
Payer: COMMERCIAL

## 2023-05-15 DIAGNOSIS — M54.2 NECK PAIN: Primary | ICD-10-CM

## 2023-05-15 PROCEDURE — 97140 MANUAL THERAPY 1/> REGIONS: CPT | Mod: GP | Performed by: PHYSICAL THERAPIST

## 2023-05-15 PROCEDURE — 97110 THERAPEUTIC EXERCISES: CPT | Mod: GP | Performed by: PHYSICAL THERAPIST

## 2023-05-22 ENCOUNTER — THERAPY VISIT (OUTPATIENT)
Dept: PHYSICAL THERAPY | Facility: REHABILITATION | Age: 68
End: 2023-05-22
Payer: COMMERCIAL

## 2023-05-22 DIAGNOSIS — M54.2 NECK PAIN: Primary | ICD-10-CM

## 2023-05-22 PROCEDURE — 97140 MANUAL THERAPY 1/> REGIONS: CPT | Mod: GP | Performed by: PHYSICAL THERAPIST

## 2023-05-22 PROCEDURE — 97110 THERAPEUTIC EXERCISES: CPT | Mod: GP | Performed by: PHYSICAL THERAPIST

## 2023-05-22 NOTE — PROGRESS NOTES
DISCHARGE  Reason for Discharge: No further expectation of progress.    Equipment Issued: N/A    Discharge Plan: Patient to continue home program.    Referring Provider:  Vinod Chris       05/22/23 0500   Appointment Info   Signing clinician's name / credentials Luis A Sanches PT   Total/Authorized Visits 8   Visits Used 8   Subjective Report   Subjective Report Pt doesn t notice any pain or issues unless turning his head while driving. He feels overall 75% improved since start of therapy.   Objective Measures   Objective Measures Objective Measure 1;Objective Measure 2   Objective Measure 1   Objective Measure Cervical AROM   Details R rotation 53 deg with tightness, L rotation 45 deg with slight pain   Objective Measure 2   Objective Measure Joint mobility- cervical   Details Hypomobile ventral glides, side glides left and right, side bending and rotation throughout cervical spine   Treatment Interventions (PT)   Interventions Therapeutic Procedure/Exercise;Manual Therapy   Therapeutic Procedure/Exercise   Therapeutic Procedures: strength, endurance, ROM, flexibillity minutes (67644) 23   Skilled Intervention Pt given verbal, visual and tactile cueing with demonstration to perform correct technique and appropriate alignment for exercises attempted. Provided rationale for exercises performed and educated on the importance of completing them as instructed.   Patient Response/Progress Liked strengthening - felt pain relief   Ther Proc 1 Cervical ROM/strengthening   Ther Proc 1 - Details Exercises for upper spine ROM and periscapular strengthening. Nustep warm up level 6 x 10 minutes. Cervical retraction with ball behind head x 15-20, seated and supine cervical side bending and rotation with contract-relax into each direction and manual resistance x 10 each.   Manual Therapy   Manual Therapy: Mobilization, MFR, MLD, friction massage minutes (20247) 30   Skilled Intervention Joint mobs, STM to reduce pain  and improve cervical mobility   Patient Response/Progress Decreased pain with L rotation   Manual Therapy 1 - Details Pt supine with legs on bolster; techniques to decrease pain and improve cervical mobility. Manual cervical traction with 30-45 sec holds, side glides left <> right C4-7 and physiologic side bending L C3-7 and physiologic rotation to the left, C4-7, grade III oscillations. STM to B cervical paraspinals and upper traps, L>R.   Plan   Home program Seated chin tucks, cervical rotation, levator scap stretch, bow and arrow, wall press, sidelying shoulder flx   Plan for next session N/A   Comments   Comments Pt has been seen for 8 visits from 10/24/22 to present with treatment focused on therapeutic exercise to improve cervical spine ROM and strength and manual therapy to reduce pain and improve spine mobility. He reports about 75% improvement overall, though hasn t met his ROM goal for the neck. 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   Total Session Time   Timed Code Treatment Minutes 53   Total Treatment Time (sum of timed and untimed services) 53   Medicare Claim Information   Medical Diagnosis Neck Pain   PT Diagnosis Neck Pain   Start of Care Date 10/24/22   Onset date of current episode/exacerbation 07/24/22   Certification date from 04/18/23   Ortho Goal 1   Goal Description Pt will be independent with his HEP for ongoing symptom managment in 12 weeks.   Goal Progress goal met, pt is working on HEP daily   Target Date 07/16/23   Goal Identifier HEP   Date Met 05/22/23   Ortho Goal 2   Goal Description Pt will demo L cervical rotation at 50 deg with no c/o pain when checking blind spot in 12 weeks   Goal Progress Goal progressing, 45 deg with pain/pulling   Target Date 07/16/23   Goal Identifier ROM       Luis A Sanches, PT, DPT, CLT  5/22/2023

## 2023-08-15 ENCOUNTER — TRANSFERRED RECORDS (OUTPATIENT)
Dept: HEALTH INFORMATION MANAGEMENT | Facility: CLINIC | Age: 68
End: 2023-08-15
Payer: COMMERCIAL

## 2023-09-03 DIAGNOSIS — R07.89 OTHER CHEST PAIN: ICD-10-CM

## 2023-09-06 RX ORDER — ATORVASTATIN CALCIUM 40 MG/1
TABLET, FILM COATED ORAL
Qty: 90 TABLET | Refills: 0 | Status: SHIPPED | OUTPATIENT
Start: 2023-09-06 | End: 2023-10-02

## 2023-09-06 NOTE — TELEPHONE ENCOUNTER
Last refill until visit   Prescription approved per [facility] Refill Protocol.  Trina Bolaños RN, BSN  [facility]

## 2023-09-18 DIAGNOSIS — E89.0 POSTOPERATIVE HYPOTHYROIDISM: ICD-10-CM

## 2023-09-18 DIAGNOSIS — I10 ESSENTIAL HYPERTENSION: ICD-10-CM

## 2023-09-19 RX ORDER — LISINOPRIL 10 MG/1
10 TABLET ORAL DAILY
Qty: 90 TABLET | Refills: 0 | Status: SHIPPED | OUTPATIENT
Start: 2023-09-19 | End: 2023-10-02

## 2023-09-19 RX ORDER — LEVOTHYROXINE SODIUM 150 UG/1
150 TABLET ORAL DAILY
Qty: 90 TABLET | Refills: 0 | Status: SHIPPED | OUTPATIENT
Start: 2023-09-19 | End: 2023-10-16

## 2023-09-25 SDOH — HEALTH STABILITY: PHYSICAL HEALTH: ON AVERAGE, HOW MANY DAYS PER WEEK DO YOU ENGAGE IN MODERATE TO STRENUOUS EXERCISE (LIKE A BRISK WALK)?: 0 DAYS

## 2023-09-25 SDOH — HEALTH STABILITY: PHYSICAL HEALTH: ON AVERAGE, HOW MANY MINUTES DO YOU ENGAGE IN EXERCISE AT THIS LEVEL?: 0 MIN

## 2023-09-25 ASSESSMENT — ENCOUNTER SYMPTOMS
FREQUENCY: 0
JOINT SWELLING: 0
WEAKNESS: 0
ABDOMINAL PAIN: 0
NAUSEA: 0
HEMATOCHEZIA: 0
ARTHRALGIAS: 1
NERVOUS/ANXIOUS: 0
SORE THROAT: 0
DIZZINESS: 0
CHILLS: 0
EYE PAIN: 0
DYSURIA: 0
HEADACHES: 1
CONSTIPATION: 1
HEARTBURN: 1
PALPITATIONS: 0
COUGH: 0
FEVER: 0
PARESTHESIAS: 0
SHORTNESS OF BREATH: 0
MYALGIAS: 0
DIARRHEA: 0
HEMATURIA: 0

## 2023-09-25 ASSESSMENT — SOCIAL DETERMINANTS OF HEALTH (SDOH)
HOW OFTEN DO YOU GET TOGETHER WITH FRIENDS OR RELATIVES?: TWICE A WEEK
IN A TYPICAL WEEK, HOW MANY TIMES DO YOU TALK ON THE PHONE WITH FAMILY, FRIENDS, OR NEIGHBORS?: TWICE A WEEK
HOW OFTEN DO YOU ATTENT MEETINGS OF THE CLUB OR ORGANIZATION YOU BELONG TO?: PATIENT DECLINED
DO YOU BELONG TO ANY CLUBS OR ORGANIZATIONS SUCH AS CHURCH GROUPS UNIONS, FRATERNAL OR ATHLETIC GROUPS, OR SCHOOL GROUPS?: PATIENT DECLINED
HOW OFTEN DO YOU ATTEND CHURCH OR RELIGIOUS SERVICES?: PATIENT DECLINED

## 2023-09-25 ASSESSMENT — LIFESTYLE VARIABLES
HOW OFTEN DO YOU HAVE A DRINK CONTAINING ALCOHOL: MONTHLY OR LESS
SKIP TO QUESTIONS 9-10: 0
HOW MANY STANDARD DRINKS CONTAINING ALCOHOL DO YOU HAVE ON A TYPICAL DAY: PATIENT DECLINED
HOW OFTEN DO YOU HAVE SIX OR MORE DRINKS ON ONE OCCASION: NEVER
AUDIT-C TOTAL SCORE: -1

## 2023-09-25 ASSESSMENT — ACTIVITIES OF DAILY LIVING (ADL): CURRENT_FUNCTION: NO ASSISTANCE NEEDED

## 2023-09-25 NOTE — COMMUNITY RESOURCES LIST (ENGLISH)
09/25/2023    [employer]  N/A  For questions about this resource list or additional care needs, please contact your primary care clinic or care manager.  Phone: [phone]   Email: N/A   Address: [address] [place] [address]   Hours: N/A        Exercise and Recreation       Gym or workout facility  1  [employer] [facility] [address] [place] [address] Distance: 3.74 miles      In-Person   [address] [place] [address]  Language: English  Hours: Mon - Sun Open 24 Hours  Fees: Insurance, Self Pay, Sliding Fee   Phone: (119) [phone] Email: [email or web address] Website: [email or web address]     2  [facility] Distance: 4.51 miles      In-Person   [address] [place] [address]  Language: English  Hours: Mon - Fri 5:00 AM - 9:00 PM , Sat - Sun 7:00 AM - 5:00 PM  Fees: Free, Self Pay, Sliding Fee   Phone: (209) [phone] Email: info@[employer] Website: https://www.[employer].LAVEGO/locations/[place]_ymca?utm_source=Right90&utm_medium=local&utm_campaign=local%20search          Important Numbers & Websites       Emergency Services   911  [facility] Services   311  Poison Control   (111) 197-6327  Suicide Prevention Lifeline   (934) 126-4477 (TALK)  Child Abuse Hotline   (192) 988-7559 (4-A-Child)  Sexual Assault Hotline   (318) 831-5505 (HOPE)  National Runaway Safeline   (665) 682-2341 (RUNAWAY)  All-Options Talkline   (672) 381-4965  Substance Abuse Referral   (717) 712-7247 (HELP)

## 2023-09-25 NOTE — COMMUNITY RESOURCES LIST (ENGLISH)
09/25/2023    [employer]  N/A  For questions about this resource list or additional care needs, please contact your primary care clinic or care manager.  Phone: [phone]   Email: N/A   Address: [address] [place] [address]   Hours: N/A        Exercise and Recreation       Gym or workout facility  1  [employer] [facility] [place] [address] Distance: 3.74 miles      In-Person   [address] [place] [address]  Language: English  Hours: Mon - Sun Open 24 Hours  Fees: Insurance, Self Pay, Sliding Fee   Phone: (210) [phone] Email: [email or web address] Website: [email or web address]/     2  [facility] Distance: 4.51 miles      In-Person   [address] [place] [address]  Language: English  Hours: Mon - Fri 5:00 AM - 9:00 PM , Sat - Sun 7:00 AM - 5:00 PM  Fees: Free, Self Pay, Sliding Fee   Phone: (985) 304-5306 Email: info@[employer] Website: https://www.[employer]/locations/[place]_ymca?utm_source=Anke&utm_medium=local&utm_campaign=local%20search          Important Numbers & Websites       Emergency Services   911  [facility] Services   311  Poison Control   (934) 582-5495  Suicide Prevention Lifeline   (657) 174-4466 (TALK)  Child Abuse Hotline   (763) 543-1044 (4-A-Child)  Sexual Assault Hotline   (556) 669-1214 (HOPE)  National Runaway Safeline   (397) 434-8332 (RUNAWAY)  All-Options Talkline   (197) 902-2129  Substance Abuse Referral   (414) 198-8862 (HELP)

## 2023-10-02 ENCOUNTER — OFFICE VISIT (OUTPATIENT)
Dept: PEDIATRICS | Facility: CLINIC | Age: 68
End: 2023-10-02
Payer: COMMERCIAL

## 2023-10-02 VITALS
TEMPERATURE: 97.8 F | DIASTOLIC BLOOD PRESSURE: 72 MMHG | BODY MASS INDEX: 37.26 KG/M2 | HEART RATE: 60 BPM | RESPIRATION RATE: 16 BRPM | WEIGHT: 260.3 LBS | SYSTOLIC BLOOD PRESSURE: 120 MMHG | HEIGHT: 70 IN | OXYGEN SATURATION: 98 %

## 2023-10-02 DIAGNOSIS — Z13.6 CARDIOVASCULAR SCREENING; LDL GOAL LESS THAN 130: ICD-10-CM

## 2023-10-02 DIAGNOSIS — Z80.42 FAMILY HISTORY OF PROSTATE CANCER: ICD-10-CM

## 2023-10-02 DIAGNOSIS — Z23 NEED FOR PROPHYLACTIC VACCINATION AND INOCULATION AGAINST INFLUENZA: ICD-10-CM

## 2023-10-02 DIAGNOSIS — Z00.00 ENCOUNTER FOR MEDICARE ANNUAL WELLNESS EXAM: Primary | ICD-10-CM

## 2023-10-02 DIAGNOSIS — E66.01 MORBID OBESITY (H): ICD-10-CM

## 2023-10-02 DIAGNOSIS — R73.03 PREDIABETES: ICD-10-CM

## 2023-10-02 DIAGNOSIS — K21.9 GASTROESOPHAGEAL REFLUX DISEASE WITHOUT ESOPHAGITIS: ICD-10-CM

## 2023-10-02 DIAGNOSIS — M50.30 DDD (DEGENERATIVE DISC DISEASE), CERVICAL: ICD-10-CM

## 2023-10-02 DIAGNOSIS — I10 ESSENTIAL HYPERTENSION: ICD-10-CM

## 2023-10-02 DIAGNOSIS — G71.09 HEREDITARY PROGRESSIVE MUSCULAR DYSTROPHY (H): ICD-10-CM

## 2023-10-02 DIAGNOSIS — E89.0 POSTOPERATIVE HYPOTHYROIDISM: ICD-10-CM

## 2023-10-02 DIAGNOSIS — K50.00 CROHN'S DISEASE OF SMALL INTESTINE WITHOUT COMPLICATION (H): ICD-10-CM

## 2023-10-02 LAB
ALBUMIN SERPL BCG-MCNC: 4.2 G/DL (ref 3.5–5.2)
ALP SERPL-CCNC: 66 U/L (ref 40–129)
ALT SERPL W P-5'-P-CCNC: 18 U/L (ref 0–70)
ANION GAP SERPL CALCULATED.3IONS-SCNC: 12 MMOL/L (ref 7–15)
AST SERPL W P-5'-P-CCNC: 22 U/L (ref 0–45)
BASO+EOS+MONOS # BLD AUTO: NORMAL 10*3/UL
BASO+EOS+MONOS NFR BLD AUTO: NORMAL %
BASOPHILS # BLD AUTO: 0 10E3/UL (ref 0–0.2)
BASOPHILS NFR BLD AUTO: 1 %
BILIRUB SERPL-MCNC: 0.6 MG/DL
BUN SERPL-MCNC: 8 MG/DL (ref 8–23)
CALCIUM SERPL-MCNC: 8.6 MG/DL (ref 8.8–10.2)
CHLORIDE SERPL-SCNC: 104 MMOL/L (ref 98–107)
CHOLEST SERPL-MCNC: 116 MG/DL
CREAT SERPL-MCNC: 0.79 MG/DL (ref 0.67–1.17)
DEPRECATED HCO3 PLAS-SCNC: 23 MMOL/L (ref 22–29)
EGFRCR SERPLBLD CKD-EPI 2021: >90 ML/MIN/1.73M2
EOSINOPHIL # BLD AUTO: 0.3 10E3/UL (ref 0–0.7)
EOSINOPHIL NFR BLD AUTO: 4 %
ERYTHROCYTE [DISTWIDTH] IN BLOOD BY AUTOMATED COUNT: 12.3 % (ref 10–15)
GLUCOSE SERPL-MCNC: 116 MG/DL (ref 70–99)
HBA1C MFR BLD: 6.7 % (ref 0–5.6)
HCT VFR BLD AUTO: 45.2 % (ref 40–53)
HDLC SERPL-MCNC: 35 MG/DL
HGB BLD-MCNC: 15 G/DL (ref 13.3–17.7)
IMM GRANULOCYTES # BLD: 0 10E3/UL
IMM GRANULOCYTES NFR BLD: 0 %
LDLC SERPL CALC-MCNC: 59 MG/DL
LYMPHOCYTES # BLD AUTO: 1.6 10E3/UL (ref 0.8–5.3)
LYMPHOCYTES NFR BLD AUTO: 19 %
MCH RBC QN AUTO: 28.8 PG (ref 26.5–33)
MCHC RBC AUTO-ENTMCNC: 33.2 G/DL (ref 31.5–36.5)
MCV RBC AUTO: 87 FL (ref 78–100)
MONOCYTES # BLD AUTO: 0.7 10E3/UL (ref 0–1.3)
MONOCYTES NFR BLD AUTO: 9 %
NEUTROPHILS # BLD AUTO: 5.6 10E3/UL (ref 1.6–8.3)
NEUTROPHILS NFR BLD AUTO: 67 %
NONHDLC SERPL-MCNC: 81 MG/DL
PLATELET # BLD AUTO: 235 10E3/UL (ref 150–450)
POTASSIUM SERPL-SCNC: 4.1 MMOL/L (ref 3.4–5.3)
PROT SERPL-MCNC: 7.2 G/DL (ref 6.4–8.3)
PSA SERPL DL<=0.01 NG/ML-MCNC: 0.51 NG/ML (ref 0–4.5)
RBC # BLD AUTO: 5.2 10E6/UL (ref 4.4–5.9)
SODIUM SERPL-SCNC: 139 MMOL/L (ref 135–145)
TRIGL SERPL-MCNC: 109 MG/DL
TSH SERPL DL<=0.005 MIU/L-ACNC: 3.34 UIU/ML (ref 0.3–4.2)
WBC # BLD AUTO: 8.3 10E3/UL (ref 4–11)

## 2023-10-02 PROCEDURE — 83036 HEMOGLOBIN GLYCOSYLATED A1C: CPT | Performed by: INTERNAL MEDICINE

## 2023-10-02 PROCEDURE — 99213 OFFICE O/P EST LOW 20 MIN: CPT | Mod: 25 | Performed by: INTERNAL MEDICINE

## 2023-10-02 PROCEDURE — G0439 PPPS, SUBSEQ VISIT: HCPCS | Performed by: INTERNAL MEDICINE

## 2023-10-02 PROCEDURE — 90662 IIV NO PRSV INCREASED AG IM: CPT | Performed by: INTERNAL MEDICINE

## 2023-10-02 PROCEDURE — G0103 PSA SCREENING: HCPCS | Performed by: INTERNAL MEDICINE

## 2023-10-02 PROCEDURE — 80053 COMPREHEN METABOLIC PANEL: CPT | Performed by: INTERNAL MEDICINE

## 2023-10-02 PROCEDURE — 80061 LIPID PANEL: CPT | Performed by: INTERNAL MEDICINE

## 2023-10-02 PROCEDURE — 90471 IMMUNIZATION ADMIN: CPT | Performed by: INTERNAL MEDICINE

## 2023-10-02 PROCEDURE — 85025 COMPLETE CBC W/AUTO DIFF WBC: CPT | Performed by: INTERNAL MEDICINE

## 2023-10-02 PROCEDURE — 84443 ASSAY THYROID STIM HORMONE: CPT | Performed by: INTERNAL MEDICINE

## 2023-10-02 PROCEDURE — 36415 COLL VENOUS BLD VENIPUNCTURE: CPT | Performed by: INTERNAL MEDICINE

## 2023-10-02 RX ORDER — FAMOTIDINE 40 MG/1
40 TABLET, FILM COATED ORAL 2 TIMES DAILY PRN
Qty: 180 TABLET | Refills: 11 | Status: SHIPPED | OUTPATIENT
Start: 2023-10-02

## 2023-10-02 RX ORDER — LISINOPRIL 10 MG/1
10 TABLET ORAL DAILY
Qty: 90 TABLET | Refills: 11 | Status: SHIPPED | OUTPATIENT
Start: 2023-10-02

## 2023-10-02 RX ORDER — ATORVASTATIN CALCIUM 40 MG/1
40 TABLET, FILM COATED ORAL EVERY EVENING
Qty: 90 TABLET | Refills: 11 | Status: SHIPPED | OUTPATIENT
Start: 2023-10-02

## 2023-10-02 ASSESSMENT — ENCOUNTER SYMPTOMS
SORE THROAT: 0
NERVOUS/ANXIOUS: 0
PALPITATIONS: 0
MYALGIAS: 0
DYSURIA: 0
FEVER: 0
HEMATOCHEZIA: 0
COUGH: 0
JOINT SWELLING: 0
WEAKNESS: 0
FREQUENCY: 0
NAUSEA: 0
CHILLS: 0
CONSTIPATION: 1
SHORTNESS OF BREATH: 0
ABDOMINAL PAIN: 0
ARTHRALGIAS: 1
DIZZINESS: 0
HEADACHES: 1
HEARTBURN: 1
PARESTHESIAS: 0
EYE PAIN: 0
DIARRHEA: 0
HEMATURIA: 0

## 2023-10-02 ASSESSMENT — ACTIVITIES OF DAILY LIVING (ADL): CURRENT_FUNCTION: NO ASSISTANCE NEEDED

## 2023-10-02 ASSESSMENT — PAIN SCALES - GENERAL: PAINLEVEL: SEVERE PAIN (6)

## 2023-10-02 NOTE — COMMUNITY RESOURCES LIST (ENGLISH)
10/02/2023    [employer]  N/A  For questions about this resource list or additional care needs, please contact your primary care clinic or care manager.  Phone: [phone]   Email: N/A   Address: [address] [place] [address]   Hours: N/A        Exercise and Recreation       Gym or workout facility  1  [employer] [facility] [place] [address] Distance: 3.74 miles      In-Person   [address] [place] [address]  Language: English  Hours: Mon - Sun Open 24 Hours  Fees: Insurance, Self Pay, Sliding Fee   Phone: (664) 913-7568 Email: [email or web address] Website: [email or web address]://[email or web address]/[email or web address]/     2  [facility] Distance: 4.51 miles      In-Person   [address] [place] [address]  Language: English  Hours: Mon - Fri 5:00 AM - 9:00 PM , Sat - Sun 7:00 AM - 5:00 PM  Fees: Free, Self Pay, Sliding Fee   Phone: (728) 257-2597 Email: info@[employer] Website: https://www.[employer]/locations/[place]_ymca?utm_source=[place]ia&utm_medium=local&utm_campaign=local%20search          Important Numbers & Websites       Emergency Services   911  [facility] Services   311  Poison Control   (948) 757-9950  Suicide Prevention Lifeline   (623) 328-6344 (TALK)  Child Abuse Hotline   (577) 466-7806 (4-A-Child)  Sexual Assault Hotline   (798) 497-3053 (HOPE)  National Runaway Safeline   (842) 740-7353 (RUNAWAY)  All-Options Talkline   (863) 718-3601  Substance Abuse Referral   (356) 495-9093 (HELP)

## 2023-10-02 NOTE — COMMUNITY RESOURCES LIST (ENGLISH)
10/02/2023   [facility]  N/A  For additional resource needs, please contact your health insurance member services or your primary care team.  Phone: [phone]   Email: N/A   Address: [address] [place] [address]   Hours: N/A        Exercise and Recreation       Gym or workout facility  1  [employer] [facility] [place] [address] Distance: 3.74 miles      In-Person   [address] [place] [address]  Language: English  Hours: Mon - Sun Open 24 Hours  Fees: Insurance, Self Pay, Sliding Fee   Phone: (885) 516-7277 Email: [email or web address] Website: [email or web address]-[email or web address]/     2  [facility] Distance: 4.51 miles      In-Person   [address] [place] [address]  Language: English  Hours: Mon - Fri 5:00 AM - 9:00 PM , Sat - Sun 7:00 AM - 5:00 PM  Fees: Free, Self Pay, Sliding Fee   Phone: (675) 304-9504 Email: info@[employer] Website: https://www.[employer]/locations/[place]_ymca?utm_source=Behance&utm_medium=local&utm_campaign=local%20search          Important Numbers & Websites       [address].org  Poison Control   (273) 787-6273 [place]po[place].org  Suicide and Crisis Lifeline   [address] [place], [place] [address]line.org  Childhelp National Child Abuse Hotline   851.899.4669 Childhelphotline.org  National Sexual Assault Hotline   (573) 937-5543 (HOPE) Rainn.org  National Runaway Safeline   (320) 437-5742 (RUNAWAY) [facility].org  Pregnancy & Postpartum Support [place]   Call/text 618-534-6604 Ppsupportmn.org  Substance Abuse National Helpline ([facility]   235-780-HELP (3713) Findtreatment.gov  Emergency Services   911

## 2023-10-02 NOTE — PROGRESS NOTES
"SUBJECTIVE:   Reggie is a 68 year old who presents for Preventive Visit.      10/2/2023     9:33 AM   Additional Questions   Roomed by Delfina KLINE LPN       Are you in the first 12 months of your Medicare coverage?  No    Imm/Inj  Associated symptoms include arthralgias and headaches. Pertinent negatives include no abdominal pain, chest pain, chills, congestion, coughing, fever, joint swelling, myalgias, nausea, rash, sore throat or weakness.   Healthy Habits:     In general, how would you rate your overall health?  Fair    Frequency of exercise:  None    Do you usually eat at least 4 servings of fruit and vegetables a day, include whole grains    & fiber and avoid regularly eating high fat or \"junk\" foods?  No    Taking medications regularly:  Yes    Medication side effects:  None    Ability to successfully perform activities of daily living:  No assistance needed    Home Safety:  No safety concerns identified    Hearing Impairment:  Difficulty following a conversation in a noisy restaurant or crowded room, need to ask people to speak up or repeat themselves and difficulty understanding soft or whispered speech    In the past 6 months, have you been bothered by leaking of urine?  No    In general, how would you rate your overall mental or emotional health?  Good    Additional concerns today:  Yes      Today's PHQ-2 Score:       10/2/2023     9:30 AM   PHQ-2 ( 1999 [employer])   Q1: Little interest or pleasure in doing things 0   Q2: Feeling down, depressed or hopeless 0   PHQ-2 Score 0   Q1: Little interest or pleasure in doing things Not at all   Q2: Feeling down, depressed or hopeless Not at all   PHQ-2 Score 0           Have you ever done Advance Care Planning? (For example, a Health Directive, POLST, or a discussion with a medical provider or your loved ones about your wishes): No, advance care planning information given to patient to review.  Patient declined advance care planning discussion at this time.       Fall " risk  Fallen 2 or more times in the past year?: No  Any fall with injury in the past year?: No    Cognitive Screening   due to problems with his hands was unable to draw clock but could tell me where to put the numbers and the hands for the clock.      1) Repeat 3 items (Leader, Season, Table)    2) Clock draw: NORMAL  3) 3 item recall: Recalls 3 objects  Results: 3 items recalled: COGNITIVE IMPAIRMENT LESS LIKELY    Mini-CogTM Copyright VERNON Huynh. Licensed by the author for use in [facility]; reprinted with permission (yvette@[facility]). All rights reserved.      Do you have sleep apnea, excessive snoring or daytime drowsiness? : no    Reviewed and updated as needed this visit by clinical staff   Tobacco  Allergies  Meds              Reviewed and updated as needed this visit by Provider                 Social History     Tobacco Use    Smoking status: Former     Packs/day: 0.00     Years: 40.00     Pack years: 0.00     Types: Cigarettes     Quit date: 2/9/2020     Years since quitting: 3.6    Smokeless tobacco: Never   Substance Use Topics    Alcohol use: Yes     Comment: rare             9/25/2023    11:25 AM   Alcohol Use   Prescreen: >3 drinks/day or >7 drinks/week? No     Do you have a current opioid prescription? No  Do you use any other controlled substances or medications that are not prescribed by a provider? None          Hyperlipidemia Follow-Up    Are you regularly taking any medication or supplement to lower your cholesterol?   Yes- Atorvastatin   Are you having muscle aches or other side effects that you think could be caused by your cholesterol lowering medication?  No    Hypertension Follow-up    Do you check your blood pressure regularly outside of the clinic? No   Are you following a low salt diet? No  Are your blood pressures ever more than 140 on the top number (systolic) OR more   than 90 on the bottom number (diastolic), for example 140/90?     Hypothyroidism Follow-up    Since last  visit, patient describes the following symptoms: Weight stable, no hair loss, no skin changes, no constipation, no loose stools    Current providers sharing in care for this patient include:   Patient Care Team:  Vinod Chris MD as PCP - General  Vinod Chris MD as Assigned PCP  Yeo, Albert, MD as Assigned Musculoskeletal Provider    The following health maintenance items are reviewed in Epic and correct as of today:  Health Maintenance   Topic Date Due    LUNG CANCER SCREENING  03/08/2022    ANNUAL REVIEW OF HM ORDERS  09/29/2022    COVID-19 Vaccine (6 - 2023-24 season) 09/01/2023    MEDICARE ANNUAL WELLNESS VISIT  09/29/2023    TSH W/FREE T4 REFLEX  09/29/2023    FALL RISK ASSESSMENT  10/02/2024    COLORECTAL CANCER SCREENING  08/15/2025    DTAP/TDAP/TD IMMUNIZATION (3 - Td or Tdap) 06/13/2027    LIPID  09/29/2027    ADVANCE CARE PLANNING  09/29/2027    HEPATITIS C SCREENING  Completed    PHQ-2 (once per calendar year)  Completed    Pneumococcal Vaccine: 65+ Years  Completed    ZOSTER IMMUNIZATION  Completed    AORTIC ANEURYSM SCREENING (SYSTEM ASSIGNED)  Completed    IPV IMMUNIZATION  Aged Out    HPV IMMUNIZATION  Aged Out    MENINGITIS IMMUNIZATION  Aged Out    INFLUENZA VACCINE  Discontinued     Patient Active Problem List   Diagnosis    Hereditary progressive muscular dystrophy (H)    Tinnitus    CARDIOVASCULAR SCREENING; LDL GOAL LESS THAN 130    Health Care Home    Advanced directives, counseling/discussion    Family history of prostate cancer    Postoperative hypothyroidism    Gastroesophageal reflux disease without esophagitis    Crohn's disease of small intestine without complication (H)    Morbid obesity (H)    Prediabetes     Past Surgical History:   Procedure Laterality Date    ABDOMEN SURGERY  1970    APPENDECTOMY      APPENDECTOMY      BIOPSY  6/18/2021    CHOLECYSTECTOMY      CHOLECYSTECTOMY      COLONOSCOPY  07/08/2013    Colonoscopy Dr. Pruitt [facility]    COLONOSCOPY  07/08/2013     Procedure: COLONOSCOPY;  Colonoscopy ;  Surgeon: Vinod Pruitt MD;  Location:  GI    ENT SURGERY      T&A as a child    HC THYROID LOBECTOMY,UNILAT  01/01/1990    HC THYROID LOBECTOMY,UNILAT  01/01/2008    resection goiter    THYROIDECTOMY      TONSILLECTOMY         Social History     Tobacco Use    Smoking status: Former     Packs/day: 0.00     Years: 40.00     Pack years: 0.00     Types: Cigarettes     Quit date: 2/9/2020     Years since quitting: 3.6    Smokeless tobacco: Never   Substance Use Topics    Alcohol use: Yes     Comment: rare     Family History   Problem Relation Age of Onset    Heart Disease Mother     Cancer - colorectal Mother     Alzheimer Disease Father     Neurologic Disorder Father     Gastrointestinal Disease Brother         colon polyps    Diabetes Sister     Diabetes Brother     Other Cancer Sister         melanoma    Anxiety Disorder Daughter          Current Outpatient Medications   Medication Sig Dispense Refill    atorvastatin (LIPITOR) 40 MG tablet Take 1 tablet (40 mg) by mouth every evening 90 tablet 11    famotidine (PEPCID) 40 MG tablet Take 1 tablet (40 mg) by mouth 2 times daily as needed for heartburn 180 tablet 11    fexofenadine (ALLEGRA) 180 MG tablet Take 180 mg by mouth daily as needed for allergies      levothyroxine (SYNTHROID/LEVOTHROID) 150 MCG tablet TAKE 1 TABLET BY MOUTH EVERY DAY 90 tablet 0    lisinopril (ZESTRIL) 10 MG tablet Take 1 tablet (10 mg) by mouth daily 90 tablet 11    mesalamine ER (PENTASA) 500 MG CR capsule Take 2,000 mg by mouth 2 times daily           Review of Systems   Constitutional:  Negative for chills and fever.   HENT:  Negative for congestion, ear pain, hearing loss and sore throat.    Eyes:  Negative for pain and visual disturbance.   Respiratory:  Negative for cough and shortness of breath.    Cardiovascular:  Negative for chest pain, palpitations and peripheral edema.   Gastrointestinal:  Positive for constipation and heartburn.  "Negative for abdominal pain, diarrhea, hematochezia and nausea.   Genitourinary:  Positive for impotence. Negative for dysuria, frequency, genital sores, hematuria, penile discharge and urgency.   Musculoskeletal:  Positive for arthralgias. Negative for joint swelling and myalgias.   Skin:  Negative for rash.   Neurological:  Positive for headaches. Negative for dizziness, weakness and paresthesias.   Psychiatric/Behavioral:  Negative for mood changes. The patient is not nervous/anxious.          OBJECTIVE:   /72   Pulse 60   Temp 97.8  F (36.6  C) (Oral)   Resp 16   Ht 1.765 m (5' 9.5\")   Wt 118.1 kg (260 lb 4.8 oz)   SpO2 98%   BMI 37.89 kg/m   Estimated body mass index is 37.89 kg/m  as calculated from the following:    Height as of this encounter: 1.765 m (5' 9.5\").    Weight as of this encounter: 118.1 kg (260 lb 4.8 oz).  Physical Exam  GENERAL: alert and no distress  EYES: Eyes grossly normal to inspection, PERRL and conjunctivae and sclerae normal  HENT: ear canals and TM's normal, nose and mouth without ulcers or lesions  NECK: no adenopathy, no asymmetry, masses, or scars and thyroid normal to palpation  RESP: lungs clear to auscultation - no rales, rhonchi or wheezes  CV: regular rate and rhythm, normal S1 S2, no S3 or S4, no murmur, click or rub, no peripheral edema and peripheral pulses strong  ABDOMEN: soft, nontender, no hepatosplenomegaly, no masses and bowel sounds normal  MS: no gross musculoskeletal defects noted, no edema  SKIN: no suspicious lesions or rashes  NEURO: bilateral upper extrem neuromuscular atrophy,  strength 4/5 b/l  PSYCH: mentation appears normal, affect normal/bright    Diagnostic Test Results:  Labs reviewed in Epic    ASSESSMENT / PLAN:   (Z00.00) Encounter for Medicare annual wellness exam  (primary encounter diagnosis)    (G71.09) Hereditary progressive muscular dystrophy (H)  Comment:   Plan: stable, chronic upper extremity weakness /lives independently " "    (R73.03) Prediabetes  Comment:   Plan: Hemoglobin A1c          (K50.00) Crohn's disease of small intestine without complication (H)  Comment: stable  Plan: CBC with platelets and differential        Continue pentasa, colonoscopy q2yrs    (E89.0) Postoperative hypothyroidism  Comment:   Plan: TSH WITH FREE T4 REFLEX          (K21.9) Gastroesophageal reflux disease without esophagitis  Comment: symptoms well controlled  Plan: famotidine (PEPCID) 40 MG tablet          (E66.01) BMI 37  Comment:   Plan: continues to work on healthy diet and staying active    (Z80.42) Family history of prostate cancer  Comment:   Plan: check PSA    (I10) Essential hypertension  Comment: Adequate control.  Continue lisinopril  Plan: lisinopril (ZESTRIL) 10 MG tablet          (M50.30) DDD (degenerative disc disease), cervical  Comment: chronic neck stiffness/ some improvement with PT,  intermittent without radicular symptoms.  Referral to sports med/orthopedics  Plan: Spine  Referral         (Z13.6) CARDIOVASCULAR SCREENING; LDL GOAL LESS THAN 130  Comment:   Plan: Comprehensive metabolic panel (BMP + Alb, Alk         Phos, ALT, AST, Total. Bili, TP), Lipid panel         reflex to direct LDL Fasting          (Z23) Need for prophylactic vaccination and inoculation against influenza        COUNSELING:  Reviewed preventive health counseling, as reflected in patient instructions       Regular exercise       Healthy diet/nutrition       Colon cancer screening       Prostate cancer screening      BMI:   Estimated body mass index is 37.89 kg/m  as calculated from the following:    Height as of this encounter: 1.765 m (5' 9.5\").    Weight as of this encounter: 118.1 kg (260 lb 4.8 oz).   Weight management plan: Discussed healthy diet and exercise guidelines      He reports that he quit smoking about 3 years ago. His smoking use included cigarettes. He has never used smokeless tobacco.      Appropriate preventive services were discussed " with this patient, including applicable screening as appropriate for fall prevention, nutrition, physical activity, Tobacco-use cessation, weight loss and cognition.  Checklist reviewing preventive services available has been given to the patient.    Reviewed patients plan of care and provided an AVS. The Basic Care Plan (routine screening as documented in Health Maintenance) for Manuel meets the Care Plan requirement. This Care Plan has been established and reviewed with the Patient.          Vinod Chris MD  [facility]    Identified Health Risks:

## 2023-10-02 NOTE — PATIENT INSTRUCTIONS
Patient Education   Personalized Prevention Plan  You are due for the preventive services outlined below.  Your care team is available to assist you in scheduling these services.  If you have already completed any of these items, please share that information with your care team to update in your medical record.  Health Maintenance Due   Topic Date Due     LUNG CANCER SCREENING  03/08/2022     ANNUAL REVIEW OF HM ORDERS  09/29/2022     COVID-19 Vaccine (6 - 2023-24 season) 09/01/2023     Annual Wellness Visit  09/29/2023     Thyroid Function Lab  09/29/2023

## 2023-10-16 ENCOUNTER — TELEPHONE (OUTPATIENT)
Dept: PEDIATRICS | Facility: CLINIC | Age: 68
End: 2023-10-16
Payer: COMMERCIAL

## 2023-10-16 DIAGNOSIS — E11.65 TYPE 2 DIABETES MELLITUS WITH HYPERGLYCEMIA, WITHOUT LONG-TERM CURRENT USE OF INSULIN (H): Primary | ICD-10-CM

## 2023-10-16 DIAGNOSIS — E78.5 HYPERLIPIDEMIA LDL GOAL <100: ICD-10-CM

## 2023-10-16 DIAGNOSIS — E89.0 POSTOPERATIVE HYPOTHYROIDISM: ICD-10-CM

## 2023-10-16 RX ORDER — LEVOTHYROXINE SODIUM 150 UG/1
150 TABLET ORAL DAILY
Qty: 90 TABLET | Refills: 11 | Status: SHIPPED | OUTPATIENT
Start: 2023-10-16

## 2023-10-16 RX ORDER — LANCETS
EACH MISCELLANEOUS
Qty: 100 EACH | Refills: 6 | Status: SHIPPED | OUTPATIENT
Start: 2023-10-16

## 2023-10-16 NOTE — TELEPHONE ENCOUNTER
Please call pt.   Recent labwork consistent with diabetes--recommend diabetes education classes and have sent script for glucometer.  Does not need additional medication at this time--classes will review diet/lifestyle changes  Please assist with diabetes ed scheduling  Please schedule Reggie for diabetes follow-up with me in 6 months    Lab Results   Component Value Date    A1C 6.7 10/02/2023

## 2023-10-17 NOTE — TELEPHONE ENCOUNTER
Called and spoke with patient. RN relayed provider result note below. Patient verbalized understanding. Patient is already scheduled w/ DM education and follow up w/ PCP.  No further questions at this time.     Cipriano CELESTE RN 10/17/2023 at 8:28 AM

## 2023-11-15 ENCOUNTER — OFFICE VISIT (OUTPATIENT)
Dept: PHYSICAL MEDICINE AND REHAB | Facility: CLINIC | Age: 68
End: 2023-11-15
Payer: COMMERCIAL

## 2023-11-15 VITALS — DIASTOLIC BLOOD PRESSURE: 79 MMHG | HEART RATE: 59 BPM | SYSTOLIC BLOOD PRESSURE: 148 MMHG | OXYGEN SATURATION: 96 %

## 2023-11-15 DIAGNOSIS — M47.812 CERVICAL SPONDYLOSIS WITHOUT MYELOPATHY: Primary | ICD-10-CM

## 2023-11-15 DIAGNOSIS — M50.30 DDD (DEGENERATIVE DISC DISEASE), CERVICAL: ICD-10-CM

## 2023-11-15 PROCEDURE — 99204 OFFICE O/P NEW MOD 45 MIN: CPT | Performed by: PAIN MEDICINE

## 2023-11-15 RX ORDER — BALSALAZIDE DISODIUM 750 MG/1
3 CAPSULE ORAL EVERY 12 HOURS
COMMUNITY
Start: 2023-11-09 | End: 2024-07-09

## 2023-11-15 RX ORDER — DIAZEPAM 5 MG
TABLET ORAL
Qty: 1 TABLET | Refills: 0 | Status: SHIPPED | OUTPATIENT
Start: 2023-11-15 | End: 2024-02-01

## 2023-11-15 ASSESSMENT — PAIN SCALES - GENERAL: PAINLEVEL: EXTREME PAIN (8)

## 2023-11-15 NOTE — PATIENT INSTRUCTIONS
I ordered an MRI of your neck.  Once I review this I will have one of our nurses give you a call with results and recommendations.    ~Please call Nurse Navigation line [phone] with any questions or concerns about your treatment plan, if symptoms worsen and you would like to be seen urgently, or if you have problems controlling bladder and bowel function.

## 2023-11-15 NOTE — PROGRESS NOTES
ASSESSMENT: Manuel Schofield is a 68 year old male presents for consultation at the request of [facility] primary care providerVinod Chris, with past medical history significant for tinnitus, GERD, Crohn's, obesity, postoperative hypothyroidism, type 2 diabetes, hyperlipidemia, muscular dystrophy who presents today for new patient evaluation of left greater than right neck pain:     -Patient's neck pain is likely secondary to cervical spondylosis without myelopathy.  He has known motor neuron disease unspecified with slow progression especially affecting bilateral hands.    Patient is neurologically intact on exam. No myelopathic or red flag symptoms.      Oswestry (WADE) Questionnaire        11/10/2023    12:39 PM   OSWESTRY DISABILITY INDEX   Count 10   Sum 3   Oswestry Score (%) 6 %       Neck Disability Index:      11/10/2023    12:43 PM   Neck Disability Index (  Bucky H. and Eleanor MEEK. 1991. All rights reserved.; used with permission)   SECTION 1 - PAIN INTENSITY 2   SECTION 2 - PERSONAL CARE 0   SECTION 3 - LIFTING 1   SECTION 4 - READING 1   SECTION 5 - HEADACHES 0   SECTION 6 - CONCENTRATION 0   SECTION 7 - WORK 1   SECTION 8 - DRIVING 1   SECTION 9 - SLEEPING 3   SECTION 10 - RECREATION 0   Count 10   Sum 9   Raw Score: /50 9   Neck Disability Index Score: (%) 18 %     Diagnoses and all orders for this visit:  Cervical spondylosis without myelopathy  -     MR Cervical Spine w/o Contrast; Future  -     diazepam (VALIUM) 5 MG tablet; Valium 5 mg p.o., take 1 tablet prior to MRI as instructed by radiology.  Must have .  DDD (degenerative disc disease), cervical  -     Spine  Referral  -     MR Cervical Spine w/o Contrast; Future  -     diazepam (VALIUM) 5 MG tablet; Valium 5 mg p.o., take 1 tablet prior to MRI as instructed by radiology.  Must have .     PLAN:  Reviewed spine anatomy and disease process. Discussed diagnosis and treatment options with the patient today. A  shared decision making model was used. The patient's values and choices were respected. The following represents what was discussed and decided upon by the provider and the patient.     -DIAGNOSTIC TESTS:  Images were personally reviewed and interpreted and explained to patient today using spine model.   -- MRI of the cervical spine dated 11/3/2004 report is reviewed and does show type II hyperintensity within the cervical spinal cord consistent with history of patient's syrinx from C4-C6.  There is also disc height narrowing at C4-5, C5-6 and C6-7.  -- X-ray of cervical spine dated 4/20/2023 is personally reviewed images interpreted and discussed with the patient.  Does show mild reversal of usual cervical lordosis with slight anterolisthesis of C4 on C5.  There is moderate interspace and endplate degeneration at C5-6 and C6-7.  Suspected facet joint ankylosis at C2-3 through C4-5.  -- I ordered an MRI of the cervical spine to further evaluate.  We discussed medial branch blocks with Alayna radiofrequency ablation which I would start at the left first.    -PHYSICAL THERAPY: The patient had several sessions of physical therapy.  I recommend that he continue with home exercises.  Discussed the importance of core strengthening, ROM, stretching exercises with the patient and how each of these entities is important in decreasing pain.  Explained to the patient that the purpose of physical therapy is to teach the patient a home exercise program.  These exercises need to be performed every day in order to decrease pain and prevent future occurrences of pain.  Likened it to brushing one's teeth.      -MEDICATIONS: No changes to medications.  -  Discussed multiple medication options today with patient. Discussed risks, side effects, and proper use of medications. Patient verbalized understanding.    -INTERVENTIONS: Could consider medial branch blocks with an eye radiofrequency ablation after MRI.  Discussed risks and  benefits of injections with patient today.    -PATIENT EDUCATION: We discussed pain management in a multiple fashion including physical therapy, medication management, possible future injections.    -FOLLOW-UP:   Patient will follow-up after MRI and potential injection.    Advised patient to call the Spine Center if symptoms worsen or you have problems controlling bladder and bowel function.   ______________________________________________________________________    SUBJECTIVE:   Manuel Schofield  is a 68 year old male who presents today for new patient evaluation of neck pain which is worse on the left side.  Patient was seen by his primary care provider and physical therapy was ordered.  X-ray was also obtained.  He is referred to the spine center for further evaluation after not much improvement with physical therapy.  Patient notes that for over a year he has been having increasingly worse left greater than right neck pain.  His pain today is 4/10 is worst is 9/10 its best is 3/10.  Pain is worse with turning his head and looking down for too long with things such as reading.  Pain comes and goes.  He had 8 sessions of physical therapy.  He notes that it does help with pain while he is doing exercises, however when he stops pain returns.  He does have an unspecified motor neuron disease for which she has been worked up by neurology at [facility] and [facility].  As they were not able to help with treatment or further diagnosis he has not had follow-up for this since 2004.  He denies any bowel or bladder changes, fevers, chills, unintentional weight loss.  He notes that he virtually has normal use of his hands, secondary to weakness.    -Treatment to Date: MRI of cervical spine dated 11/3/2004.  X-ray of cervical spine dated 4/20/2023.  8 sessions of physical therapy that are recent.    -Medications:    Current Outpatient Medications   Medication    balsalazide (COLAZAL) 750 MG capsule    diazepam  (VALIUM) 5 MG tablet    atorvastatin (LIPITOR) 40 MG tablet    blood glucose (NO BRAND SPECIFIED) test strip    blood glucose monitoring (NO BRAND SPECIFIED) meter device kit    famotidine (PEPCID) 40 MG tablet    levothyroxine (SYNTHROID/LEVOTHROID) 150 MCG tablet    lisinopril (ZESTRIL) 10 MG tablet    thin (NO BRAND SPECIFIED) lancets     Current Facility-Administered Medications   Medication    methylPREDNISolone (DEPO-MEDROL) injection 40 mg       Allergies   Allergen Reactions    Peanut-Containing Drug Products     Seasonal Allergies        Past Medical History:   Diagnosis Date    Acid reflux     Chest pain, unspecified 10/01/2004    adenosine cardiolyte:ECG negative, no ischemia, EF 59%    Coronary artery disease     Crohn's disease (H)     Crohn's disease (H)     Esophageal reflux     EGD 2005 normal    Hereditary progressive muscular dystrophy (H)     HTN (hypertension)     Hypertension     Hypothyroidism     Internal hemorrhoids without mention of complication     Motor neuron disease (H)     Type 2 diabetes mellitus with hyperglycemia (H) 10/16/2023    Unspecified tinnitus     chronic, ENT evaluation        Patient Active Problem List   Diagnosis    Hereditary progressive muscular dystrophy (H)    Tinnitus    Health Care Home    Advanced directives, counseling/discussion    Family history of prostate cancer    Postoperative hypothyroidism    Gastroesophageal reflux disease without esophagitis    Crohn's disease of small intestine without complication (H)    Morbid obesity (H)    Type 2 diabetes mellitus with hyperglycemia (H)    Hyperlipidemia LDL goal <100       Past Surgical History:   Procedure Laterality Date    ABDOMEN SURGERY  1970    APPENDECTOMY      APPENDECTOMY      BIOPSY  6/18/2021    CHOLECYSTECTOMY      CHOLECYSTECTOMY      COLONOSCOPY  07/08/2013    [facility]    COLONOSCOPY  07/08/2013    Procedure: COLONOSCOPY;  Colonoscopy ;  Surgeon: Vinod Pruitt MD;  Location:   GI    ENT SURGERY      T&A as a child     THYROID LOBECTOMY,UNILAT  01/01/1990    HC THYROID LOBECTOMY,UNILAT  01/01/2008    resection goiter    THYROIDECTOMY      TONSILLECTOMY         Family History   Problem Relation Age of Onset    Heart Disease Mother     Cancer - colorectal Mother     Alzheimer Disease Father     Neurologic Disorder Father     Gastrointestinal Disease Brother         colon polyps    Diabetes Sister     Diabetes Brother     Other Cancer Sister         melanoma    Anxiety Disorder Daughter        Reviewed past medical, surgical, and family history with patient found on new patient intake packet located in EMR Media tab.     SOCIAL HX: He denies smoking, drinking alcohol or using recreational drugs.    ROS:  Specifically negative for bowel/bladder dysfunction, balance changes, headache, dizziness, foot drop, fevers, chills, appetite changes, nausea/vomiting, unexplained weight loss. Otherwise 13 systems reviewed are negative. Please see the patient's intake questionnaire from today for details.    OBJECTIVE:  BP (!) 148/79   Pulse 59   SpO2 96%     PHYSICAL EXAMINATION:  --CONSTITUTIONAL: Vital signs as above. No acute distress. The patient is well nourished and well groomed.  --PSYCHIATRIC: The patient is awake, alert, oriented to person, place, time and answering questions appropriately with clear speech. Appropriate mood and affect   --HEENT: Sclera are non-injected. Extraocular muscles are intact.   --SKIN: Skin over the face, bilateral upper extremities, and posterior torso is clean, dry, intact without rashes.   --RESPIRATORY: Normal rhythm and effort. No abnormal accessory muscle breathing patterns noted.   --GROSS MOTOR: Easily arises from a seated position. Toe walking and heel walking are normal.    --CERVICAL SPINE: Inspection reveals no evidence of deformity. Range of motion is moderately limited in cervical flexion, extension, lateral rotation.  Tenderness palpation left  greater than right neck.  --SHOULDERS: Full range of motion bilaterally. Negative empty can.  --UPPER EXTREMITY MOTOR TESTING:  Wrist flexion left 1/5, right 1/5  Wrist extension left 2/5, right 2/5  Pronators left 5/5, right 5/5  Biceps left 5/5, right 5/5   Triceps left 5/5, right 5/5   Shoulder abduction left 5/5, right 5/5   left 1/5, right 1/5  --NEUROLOGIC:  3/4 symmetric biceps, brachioradialis, triceps reflexes bilaterally. Sensation to upper extremities is normal. --VASCULAR:  Warm upper limbs bilaterally.   RESULTS: Prior medical records from [facility] primary care provider were reviewed today.     Imaging:  Cervical spine Imaging was personally reviewed and interpreted today. The images were shown to the patient and the findings were explained using a spine model.

## 2023-11-15 NOTE — LETTER
11/15/2023         RE: Manuel Schofield  [address]  [place] [address]        Dear Colleague,    Thank you for referring your patient, Manuel Schofield, to the [facility]. Please see a copy of my visit note below.    ASSESSMENT: Manuel Schofield is a 68 year old male presents for consultation at the request of [facility] primary care providerGaviviana, Vinod Royal, with past medical history significant for tinnitus, GERD, Crohn's, obesity, postoperative hypothyroidism, type 2 diabetes, hyperlipidemia, muscular dystrophy who presents today for new patient evaluation of left greater than right neck pain:     -Patient's neck pain is likely secondary to cervical spondylosis without myelopathy.  He has known motor neuron disease unspecified with slow progression especially affecting bilateral hands.    Patient is neurologically intact on exam. No myelopathic or red flag symptoms.      Oswestry (WDAE) Questionnaire        11/10/2023    12:39 PM   OSWESTRY DISABILITY INDEX   Count 10   Sum 3   Oswestry Score (%) 6 %       Neck Disability Index:      11/10/2023    12:43 PM   Neck Disability Index (  Bucky H. and Eleanor C. 1991. All rights reserved.; used with permission)   SECTION 1 - PAIN INTENSITY 2   SECTION 2 - PERSONAL CARE 0   SECTION 3 - LIFTING 1   SECTION 4 - READING 1   SECTION 5 - HEADACHES 0   SECTION 6 - CONCENTRATION 0   SECTION 7 - WORK 1   SECTION 8 - DRIVING 1   SECTION 9 - SLEEPING 3   SECTION 10 - RECREATION 0   Count 10   Sum 9   Raw Score: /50 9   Neck Disability Index Score: (%) 18 %     Diagnoses and all orders for this visit:  Cervical spondylosis without myelopathy  -     MR Cervical Spine w/o Contrast; Future  -     diazepam (VALIUM) 5 MG tablet; Valium 5 mg p.o., take 1 tablet prior to MRI as instructed by radiology.  Must have .  DDD (degenerative disc disease), cervical  -     Spine  Referral  -     MR Cervical Spine w/o Contrast; Future  -      diazepam (VALIUM) 5 MG tablet; Valium 5 mg p.o., take 1 tablet prior to MRI as instructed by radiology.  Must have .     PLAN:  Reviewed spine anatomy and disease process. Discussed diagnosis and treatment options with the patient today. A shared decision making model was used. The patient's values and choices were respected. The following represents what was discussed and decided upon by the provider and the patient.     -DIAGNOSTIC TESTS:  Images were personally reviewed and interpreted and explained to patient today using spine model.   -- MRI of the cervical spine dated 11/3/2004 report is reviewed and does show type II hyperintensity within the cervical spinal cord consistent with history of patient's syrinx from C4-C6.  There is also disc height narrowing at C4-5, C5-6 and C6-7.  -- X-ray of cervical spine dated 4/20/2023 is personally reviewed images interpreted and discussed with the patient.  Does show mild reversal of usual cervical lordosis with slight anterolisthesis of C4 on C5.  There is moderate interspace and endplate degeneration at C5-6 and C6-7.  Suspected facet joint ankylosis at C2-3 through C4-5.  -- I ordered an MRI of the cervical spine to further evaluate.  We discussed medial branch blocks with Alayna radiofrequency ablation which I would start at the left first.    -PHYSICAL THERAPY: The patient had several sessions of physical therapy.  I recommend that he continue with home exercises.  Discussed the importance of core strengthening, ROM, stretching exercises with the patient and how each of these entities is important in decreasing pain.  Explained to the patient that the purpose of physical therapy is to teach the patient a home exercise program.  These exercises need to be performed every day in order to decrease pain and prevent future occurrences of pain.  Likened it to brushing one's teeth.      -MEDICATIONS: No changes to medications.  -  Discussed multiple medication  options today with patient. Discussed risks, side effects, and proper use of medications. Patient verbalized understanding.    -INTERVENTIONS: Could consider medial branch blocks with an eye radiofrequency ablation after MRI.  Discussed risks and benefits of injections with patient today.    -PATIENT EDUCATION: We discussed pain management in a multiple fashion including physical therapy, medication management, possible future injections.    -FOLLOW-UP:   Patient will follow-up after MRI and potential injection.    Advised patient to call the Spine Center if symptoms worsen or you have problems controlling bladder and bowel function.   ______________________________________________________________________    SUBJECTIVE:   Manuel Schofield  is a 68 year old male who presents today for new patient evaluation of neck pain which is worse on the left side.  Patient was seen by his primary care provider and physical therapy was ordered.  X-ray was also obtained.  He is referred to the spine center for further evaluation after not much improvement with physical therapy.  Patient notes that for over a year he has been having increasingly worse left greater than right neck pain.  His pain today is 4/10 is worst is 9/10 its best is 3/10.  Pain is worse with turning his head and looking down for too long with things such as reading.  Pain comes and goes.  He had 8 sessions of physical therapy.  He notes that it does help with pain while he is doing exercises, however when he stops pain returns.  He does have an unspecified motor neuron disease for which she has been worked up by neurology at [facility] and [facility].  As they were not able to help with treatment or further diagnosis he has not had follow-up for this since 2004.  He denies any bowel or bladder changes, fevers, chills, unintentional weight loss.  He notes that he virtually has normal use of his hands, secondary to weakness.    -Treatment to Date:  MRI of cervical spine dated 11/3/2004.  X-ray of cervical spine dated 4/20/2023.  8 sessions of physical therapy that are recent.    -Medications:    Current Outpatient Medications   Medication     balsalazide (COLAZAL) 750 MG capsule     diazepam (VALIUM) 5 MG tablet     atorvastatin (LIPITOR) 40 MG tablet     blood glucose (NO BRAND SPECIFIED) test strip     blood glucose monitoring (NO BRAND SPECIFIED) meter device kit     famotidine (PEPCID) 40 MG tablet     levothyroxine (SYNTHROID/LEVOTHROID) 150 MCG tablet     lisinopril (ZESTRIL) 10 MG tablet     thin (NO BRAND SPECIFIED) lancets     Current Facility-Administered Medications   Medication     methylPREDNISolone (DEPO-MEDROL) injection 40 mg       Allergies   Allergen Reactions     Peanut-Containing Drug Products      Seasonal Allergies        Past Medical History:   Diagnosis Date     Acid reflux      Chest pain, unspecified 10/01/2004    adenosine cardiolyte:ECG negative, no ischemia, EF 59%     Coronary artery disease      Crohn's disease (H)      Crohn's disease (H)      Esophageal reflux     EGD 2005 normal     Hereditary progressive muscular dystrophy (H)      HTN (hypertension)      Hypertension      Hypothyroidism      Internal hemorrhoids without mention of complication      Motor neuron disease (H)      Type 2 diabetes mellitus with hyperglycemia (H) 10/16/2023     Unspecified tinnitus     chronic, ENT evaluation        Patient Active Problem List   Diagnosis     Hereditary progressive muscular dystrophy (H)     Tinnitus     Health Care Home     Advanced directives, counseling/discussion     Family history of prostate cancer     Postoperative hypothyroidism     Gastroesophageal reflux disease without esophagitis     Crohn's disease of small intestine without complication (H)     Morbid obesity (H)     Type 2 diabetes mellitus with hyperglycemia (H)     Hyperlipidemia LDL goal <100       Past Surgical History:   Procedure Laterality Date     ABDOMEN  SURGERY  1970     APPENDECTOMY       APPENDECTOMY       BIOPSY  6/18/2021     CHOLECYSTECTOMY       CHOLECYSTECTOMY       COLONOSCOPY  07/08/2013    Colonoscopy Dr[facility] Pru[facility]     COLONOSCOPY  07/08/2013    Procedure: COLONOSCOPY;  Colonoscopy ;  Surgeon: Vinod Pruitt MD;  Location:  GI     ENT SURGERY      T&A as a child     HC THYROID LOBECTOMY,UNILAT  01/01/1990     HC THYROID LOBECTOMY,UNILAT  01/01/2008    resection goiter     THYROIDECTOMY       TONSILLECTOMY         Family History   Problem Relation Age of Onset     Heart Disease Mother      Cancer - colorectal Mother      Alzheimer Disease Father      Neurologic Disorder Father      Gastrointestinal Disease Brother         colon polyps     Diabetes Sister      Diabetes Brother      Other Cancer Sister         melanoma     Anxiety Disorder Daughter        Reviewed past medical, surgical, and family history with patient found on new patient intake packet located in EMR Media tab.     SOCIAL HX: He denies smoking, drinking alcohol or using recreational drugs.    ROS:  Specifically negative for bowel/bladder dysfunction, balance changes, headache, dizziness, foot drop, fevers, chills, appetite changes, nausea/vomiting, unexplained weight loss. Otherwise 13 systems reviewed are negative. Please see the patient's intake questionnaire from today for details.    OBJECTIVE:  BP (!) 148/79   Pulse 59   SpO2 96%     PHYSICAL EXAMINATION:  --CONSTITUTIONAL: Vital signs as above. No acute distress. The patient is well nourished and well groomed.  --PSYCHIATRIC: The patient is awake, alert, oriented to person, place, time and answering questions appropriately with clear speech. Appropriate mood and affect   --HEENT: Sclera are non-injected. Extraocular muscles are intact.   --SKIN: Skin over the face, bilateral upper extremities, and posterior torso is clean, dry, intact without rashes.   --RESPIRATORY: Normal rhythm and effort. No abnormal accessory muscle  breathing patterns noted.   --GROSS MOTOR: Easily arises from a seated position. Toe walking and heel walking are normal.    --CERVICAL SPINE: Inspection reveals no evidence of deformity. Range of motion is moderately limited in cervical flexion, extension, lateral rotation.  Tenderness palpation left greater than right neck.  --SHOULDERS: Full range of motion bilaterally. Negative empty can.  --UPPER EXTREMITY MOTOR TESTING:  Wrist flexion left 1/5, right 1/5  Wrist extension left 2/5, right 2/5  Pronators left 5/5, right 5/5  Biceps left 5/5, right 5/5   Triceps left 5/5, right 5/5   Shoulder abduction left 5/5, right 5/5   left 1/5, right 1/5  --NEUROLOGIC:  3/4 symmetric biceps, brachioradialis, triceps reflexes bilaterally. Sensation to upper extremities is normal. --VASCULAR:  Warm upper limbs bilaterally.   RESULTS: Prior medical records from [facility] primary care provider were reviewed today.     Imaging:  Cervical spine Imaging was personally reviewed and interpreted today. The images were shown to the patient and the findings were explained using a spine model.      Again, thank you for allowing me to participate in the care of your patient.        Sincerely,        Ananda Cruz, DO

## 2023-11-22 ENCOUNTER — VIRTUAL VISIT (OUTPATIENT)
Dept: EDUCATION SERVICES | Facility: CLINIC | Age: 68
End: 2023-11-22
Payer: COMMERCIAL

## 2023-11-22 DIAGNOSIS — E11.65 TYPE 2 DIABETES MELLITUS WITH HYPERGLYCEMIA, WITHOUT LONG-TERM CURRENT USE OF INSULIN (H): ICD-10-CM

## 2023-11-22 PROCEDURE — G0108 DIAB MANAGE TRN  PER INDIV: HCPCS

## 2023-11-22 NOTE — PATIENT INSTRUCTIONS
1) Check blood sugar 1-2 times per day   Fasting/morning goal:  mg/dL  2 Hours after the start of a meal: Less than 150-160 mg/dL    2) 1 serving of carbohydrate = 15 grams  Aim for 4 servings or 60 grams per meal and 15-20 grams of carbs per snack + a serving of protein  - Use my plate for meal balance and portion control   - Try adding a small snack or meal for breakfast    3) Increase physical activity- try you tube videos

## 2023-11-22 NOTE — PROGRESS NOTES
Diabetes Self-Management Education & Support    Presents for: Initial Assessment for new diagnosis    Type of Service: Telephone Visit    Originating Location (Patient Location): Home  Distant Location (Provider Location): Offsite  Mode of Communication:  Telephone    Telephone Visit Start Time:  9:35am  Telephone Visit End Time (telephone visit stop time): 10:27am    How would patient like to obtain AVS? Radha      ASSESSMENT:  Initial visit with Reggie brayan for new Dx of DM type 2.  He does report family Hx of DM.  Has a glucose meter- has been checking BG for the past several weeks.  Says he is unsure what his goals are or what is considered too high.     Reported fasting B mg/dL  Says most of the time in the morning it ranges from 105-111 mg/dL.   Post meal BG- 3 hours after BG was 160 mg/dL    Highest BG reading he has had was in the 180 mg/dL post meal.  First meal of the day is usually lunch around noon.  Diet is overall balanced.  Says he has been trying to eat less and healthier since he found out about Diabetes.  Also has been cutting out sweets.    Has lost 12-13 lbs in about the past month.    Wt Readings from Last 5 Encounters:   10/02/23 118.1 kg (260 lb 4.8 oz)   22 117.5 kg (259 lb)   22 118.4 kg (261 lb 1.6 oz)   21 119.2 kg (262 lb 12.8 oz)   21 116.1 kg (256 lb)        Is fairly sedentary, watches TV and uses computer at home during the day.  Clears snow off the driveway in the winter for activity.  Feeds birds and cares for lawn in the summer.    Patient's most recent   Lab Results   Component Value Date    A1C 6.7 10/02/2023     is meeting goal of <7.0    Diabetes knowledge and skills assessment:   Patient is knowledgeable in diabetes management concepts related to: Healthy Eating and Monitoring    Continue education with the following diabetes management concepts: Healthy Eating, Being Active, Monitoring, Taking Medication, Problem Solving, Reducing Risks, and  "Healthy Coping    Based on learning assessment above, most appropriate setting for further diabetes education would be: Individual setting.      PLAN    1) Check blood sugar 1-2 times per day   Fasting/morning goal:  mg/dL  2 Hours after the start of a meal: Less than 150-160 mg/dL    2) 1 serving of carbohydrate = 15 grams  Aim for 4 servings or 60 grams per meal and 15-20 grams of carbs per snack + a serving of protein  - Use my plate for meal balance and portion control   - Try adding a small snack or meal for breakfast    3) Increase physical activity- try you tube videos     Topics to cover at upcoming visits: Healthy Eating, Being Active, Monitoring, and Reducing Risks    Follow-up: January 18th, 9:30am- telephone visit    See Care Plan for co-developed, patient-state behavior change goals.  AVS provided for patient today.    Education Materials Provided- via mail:  [facility] Understanding Diabetes Booklet and My Plate Planner      SUBJECTIVE/OBJECTIVE:  Presents for: Initial Assessment for new diagnosis  Accompanied by: Self, Spouse  Diabetes education in the past 24mo: No  Focus of Visit: Monitoring, Taking Medication, Healthy Eating, Diabetes Pathophysiology  Diabetes type: Type 2  Date of diagnosis: October 2023  Disease course: Improving  Cultural Influences/Ethnic Background:  Not  or       Diabetes Symptoms & Complications:  Fatigue: Sometimes  Neuropathy: No  Polydipsia: No  Polyphagia: No  Polyuria: No  Visual change: No  Slow healing wounds: No  Symptom course: Stable  Weight trend: Stable  Complications assessed today?: No    Patient Problem List and Family Medical History reviewed for relevant medical history, current medical status, and diabetes risk factors.    Vitals:  There were no vitals taken for this visit.  Estimated body mass index is 37.89 kg/m  as calculated from the following:    Height as of 10/2/23: 1.765 m (5' 9.5\").    Weight as of 10/2/23: 118.1 kg (260 " "lb 4.8 oz).   Last 3 BP:   BP Readings from Last 3 Encounters:   11/15/23 (!) 148/79   10/02/23 120/72   12/14/22 130/78       History   Smoking Status    Former    Packs/day: 0.00    Years: 40.00    Types: Cigarettes    Quit date: 2/9/2020   Smokeless Tobacco    Never       Labs:  Lab Results   Component Value Date    A1C 6.7 10/02/2023     Lab Results   Component Value Date     10/02/2023     09/29/2022    GLC 99 04/05/2021     Lab Results   Component Value Date    LDL 59 10/02/2023    LDL 41 04/05/2021     HDL Cholesterol   Date Value Ref Range Status   04/05/2021 38 (L) >39 mg/dL Final     Direct Measure HDL   Date Value Ref Range Status   10/02/2023 35 (L) >=40 mg/dL Final   ]  GFR Estimate   Date Value Ref Range Status   10/02/2023 >90 >60 mL/min/1.73m2 Final   06/02/2021 >60 >60 mL/min/1.73m2 Final   04/05/2021 >90 >60 mL/min/[1.73_m2] Final     Comment:     Non  GFR Calc  Starting 12/18/2018, serum creatinine based estimated GFR (eGFR) will be   calculated using the Chronic Kidney Disease Epidemiology Collaboration   (CKD-EPI) equation.       GFR Estimate If Black   Date Value Ref Range Status   06/02/2021 >60 >60 mL/min/1.73m2 Final   04/05/2021 >90 >60 mL/min/[1.73_m2] Final     Comment:      GFR Calc  Starting 12/18/2018, serum creatinine based estimated GFR (eGFR) will be   calculated using the Chronic Kidney Disease Epidemiology Collaboration   (CKD-EPI) equation.       Lab Results   Component Value Date    CR 0.79 10/02/2023    CR 0.78 04/05/2021     No results found for: \"MICROALBUMIN\"    Healthy Eating:  Healthy Eating Assessed Today: Yes  Meals include: Lunch, Dinner, Evening Snack  Breakfast: coffee with cream  Lunch: sandwich- 12 grain bread with mustard, miricle whip, deli meat with chips  Dinner: meatloaf and vegetables- frozen mixed OR  Snacks: allergy to peanut butter- eats cashew butter OR apple in the evening  Other: sugar-free apple " pie  Beverages: Coffee, Water, Milk  Has patient met with a dietitian in the past?: No    Being Active:  Being Active Assessed Today: Yes  Exercise:: Currently not exercising  Barrier to exercise: Physical limitation (knees)    Monitoring:  Monitoring Assessed Today: Yes  Did patient bring glucose meter to appointment? : No  Blood Glucose Meter: Unknown  Times checking blood sugar at home (number): 2  Times checking blood sugar at home (per): Day  Blood glucose trend: No change      Taking Medications:      Taking Medication Assessed Today: No  Current Treatments: None  Problems taking diabetes medications regularly?: No  Diabetes medication side effects?: No    Problem Solving:  Problem Solving Assessed Today: No              Reducing Risks:  Reducing Risks Assessed Today: No  Diabetes Risks: Age over 45 years, Family History, Hyperlipidemia  CAD Risks: Male sex, Sedentary lifestyle    Healthy Coping:  Emotional response to diabetes: Ready to learn  Stage of change: ACTION (Actively working towards change)  Patient Activation Measure Survey Score:      9/21/2020    10:52 AM 9/25/2023    11:34 AM   ANA Score (Last Two)   ANA Raw Score 30 29   Activation Score 56 52.9   ANA Level 3 2         Care Plan and Education Provided:  Care Plan: Diabetes   Updates made by Haley Reed since 11/22/2023 12:00 AM        Problem: HbA1C Not In Goal         Goal: Establish Regular Follow-Ups with PCP         Task: Discuss with PCP the recommended timing for patient's next follow up visit(s)    Responsible User: Haley Reed        Task: Discuss schedule for PCP visits with patient    Responsible User: Haley Reed        Goal: Get HbA1C Level in Goal         Task: Educate patient on diabetes education self-management topics Completed 11/22/2023   Responsible User: Haley Reed        Task: Educate patient on benefits of regular glucose monitoring Completed 11/22/2023   Responsible  User: Haley Reed        Task: Refer patient to appropriate extended care team member, as needed (Medication Therapy Management, Behavioral Health, Physical Therapy, etc.)    Responsible User: Haley Reed        Task: Discuss diabetes treatment plan with patient Completed 11/22/2023   Responsible User: Haley Reed        Problem: Diabetes Self-Management Education Needed to Optimize Self-Care Behaviors         Goal: Understand diabetes pathophysiology and disease progression         Task: Provide education on diabetes pathophysiology and disease progression specfic to patient's diabetes type Completed 11/22/2023   Responsible User: Haley Reed        Goal: Healthy Eating - follow a healthy eating pattern for diabetes         Task: Provide education on portion control and consistency in amount, composition and timing of food intake Completed 11/22/2023   Responsible User: Haley Reed        Task: Provide education on managing carbohydrate intake (carbohydrate counting, plate planning method, etc.) Completed 11/22/2023   Responsible User: Haley Reed        Task: Provide education on weight management    Responsible User: Haley Reed        Task: Provide education on heart healthy eating    Responsible User: Haley Reed        Task: Provide education on eating out    Responsible User: Haley Reed        Task: Develop individualized healthy eating plan with patient    Responsible User: Haley Reed        Goal: Being Active - get regular physical activity, working up to at least 150 minutes per week         Task: Provide education on relationship of activity to glucose and precautions to take if at risk for low glucose Completed 11/22/2023   Responsible User: Haley Reed        Task: Discuss barriers to physical activity with patient Completed 11/22/2023   Responsible User:  Haley Reed        Task: Develop physical activity plan with patient    Responsible User: Haley Reed        Task: Explore community resources including walking groups, assistance programs, and home videos    Responsible User: Haley Reed        Goal: Monitoring - monitor glucose and ketones as directed    Start Date: 11/22/2023   This Visit's Progress: 40%   Note:    Begin testing BG 1-2 times daily       Task: Provide education on blood glucose monitoring (purpose, proper technique, frequency, glucose targets, interpreting results, when to use glucose control solution, sharps disposal) Completed 11/22/2023   Responsible User: Haley Reed        Task: Provide education on continuous glucose monitoring (sensor placement, use of yousif or /reader, understanding glucose trends, alerts and alarms, differences between sensor glucose and blood glucose)    Responsible User: Haley Reed        Task: Provide education on ketone monitoring (when to monitor, frequency, etc.)    Responsible User: Haley Reed        Goal: Taking Medication - patient is consistently taking medications as directed         Task: Provide education on action of prescribed medication, including when to take and possible side effects    Responsible User: Haley Reed        Task: Provide education on insulin and injectable diabetes medications, including administration, storage, site selection and rotation for injection sites    Responsible User: Haley Reed        Task: Discuss barriers to medication adherence with patient and provide management technique ideas as appropriate    Responsible User: Haley Reed        Task: Provide education on frequency and refill details of medications    Responsible User: Haley Reed        Goal: Problem Solving - know how to prevent and manage short-term diabetes complications          Task: Provide education on high blood glucose - causes, signs/symptoms, prevention and treatment    Responsible User: Haley Reed        Task: Provide education on low blood glucose - causes, signs/symptoms, prevention, treatment, carrying a carbohydrate source at all times, and medical identification    Responsible User: Haley Reed        Task: Provide education on safe travel with diabetes    Responsible User: Haley Reed        Task: Provide education on how to care for diabetes on sick days    Responsible User: Haley Reed        Task: Provide education on when to call a health care provider    Responsible User: Haley Reed        Goal: Reducing Risks - know how to prevent and treat long-term diabetes complications         Task: Provide education on major complications of diabetes, prevention, early diagnostic measures and treatment of complications    Responsible User: Haley Reed        Task: Provide education on recommended care for dental, eye and foot health    Responsible User: Haley Reed        Task: Provide education on Hemoglobin A1c - goals and relationship to blood glucose levels Completed 11/22/2023   Responsible User: Haley Reed        Task: Provide education on recommendations for heart health - lipid levels and goals, blood pressure and goals, and aspirin therapy, if indicated    Responsible User: Haley Reed        Task: Provide education on tobacco cessation    Responsible User: Haley Reed        Goal: Healthy Coping - use available resources to cope with the challenges of managing diabetes         Task: Discuss recognizing feelings about having diabetes Completed 11/22/2023   Responsible User: Haley Reed        Task: Provide education on the benefits of making appropriate lifestyle changes    Responsible User: Haley Reed         Task: Provide education on benefits of utilizing support systems    Responsible User: Haley Reed        Task: Discuss methods for coping with stress    Responsible User: Haley Reed        Task: Provide education on when to seek professional counseling    Responsible User: Haley Reed, LM, LD, [facility]  Outpatient Diabetes Education  Adult Diabetes Education Triage [phone]      Time Spent: 52 minutes  Encounter Type: Individual    Any diabetes medication dose changes were made via the CDE Protocol per the patient's referring provider. A copy of this encounter was shared with the provider.

## 2023-11-22 NOTE — LETTER
2023         RE: Manuel Schofield  [address]  [facility] [address]        Dear Colleague,    Thank you for referring your patient, Manuel Schofield, to the [facility]. Please see a copy of my visit note below.    Diabetes Self-Management Education & Support    Presents for: Initial Assessment for new diagnosis    Type of Service: Telephone Visit    Originating Location (Patient Location): Home  Distant Location (Provider Location): Offsite  Mode of Communication:  Telephone    Telephone Visit Start Time:  9:35am  Telephone Visit End Time (telephone visit stop time): 10:27am    How would patient like to obtain AVS? MyChart      ASSESSMENT:  Initial visit with Reggie today for new Dx of DM type 2.  He does report family Hx of DM.  Has a glucose meter- has been checking BG for the past several weeks.  Says he is unsure what his goals are or what is considered too high.     Reported fasting B mg/dL  Says most of the time in the morning it ranges from 105-111 mg/dL.   Post meal BG- 3 hours after BG was 160 mg/dL    Highest BG reading he has had was in the 180 mg/dL post meal.  First meal of the day is usually lunch around noon.  Diet is overall balanced.  Says he has been trying to eat less and healthier since he found out about Diabetes.  Also has been cutting out sweets.    Has lost 12-13 lbs in about the past month.    Wt Readings from Last 5 Encounters:   10/02/23 118.1 kg (260 lb 4.8 oz)   22 117.5 kg (259 lb)   22 118.4 kg (261 lb 1.6 oz)   21 119.2 kg (262 lb 12.8 oz)   21 116.1 kg (256 lb)        Is fairly sedentary, watches TV and uses computer at home during the day.  Clears snow off the driveway in the winter for activity.  Feeds birds and cares for lawn in the summer.    Patient's most recent   Lab Results   Component Value Date    A1C 6.7 10/02/2023     is meeting goal of <7.0    Diabetes knowledge and skills assessment:   Patient is  knowledgeable in diabetes management concepts related to: Healthy Eating and Monitoring    Continue education with the following diabetes management concepts: Healthy Eating, Being Active, Monitoring, Taking Medication, Problem Solving, Reducing Risks, and Healthy Coping    Based on learning assessment above, most appropriate setting for further diabetes education would be: Individual setting.      PLAN    1) Check blood sugar 1-2 times per day   Fasting/morning goal:  mg/dL  2 Hours after the start of a meal: Less than 150-160 mg/dL    2) 1 serving of carbohydrate = 15 grams  Aim for 4 servings or 60 grams per meal and 15-20 grams of carbs per snack + a serving of protein  - Use my plate for meal balance and portion control   - Try adding a small snack or meal for breakfast    3) Increase physical activity- try you tube videos     Topics to cover at upcoming visits: Healthy Eating, Being Active, Monitoring, and Reducing Risks    Follow-up: January 18th, 9:30am- telephone visit    See Care Plan for co-developed, patient-state behavior change goals.  AVS provided for patient today.    Education Materials Provided- via mail:  [employer] Jose Understanding Diabetes Booklet and My Plate Planner      SUBJECTIVE/OBJECTIVE:  Presents for: Initial Assessment for new diagnosis  Accompanied by: Self, Spouse  Diabetes education in the past 24mo: No  Focus of Visit: Monitoring, Taking Medication, Healthy Eating, Diabetes Pathophysiology  Diabetes type: Type 2  Date of diagnosis: October 2023  Disease course: Improving  Cultural Influences/Ethnic Background:  Not  or       Diabetes Symptoms & Complications:  Fatigue: Sometimes  Neuropathy: No  Polydipsia: No  Polyphagia: No  Polyuria: No  Visual change: No  Slow healing wounds: No  Symptom course: Stable  Weight trend: Stable  Complications assessed today?: No    Patient Problem List and Family Medical History reviewed for relevant medical history, current  "medical status, and diabetes risk factors.    Vitals:  There were no vitals taken for this visit.  Estimated body mass index is 37.89 kg/m  as calculated from the following:    Height as of 10/2/23: 1.765 m (5' 9.5\").    Weight as of 10/2/23: 118.1 kg (260 lb 4.8 oz).   Last 3 BP:   BP Readings from Last 3 Encounters:   11/15/23 (!) 148/79   10/02/23 120/72   12/14/22 130/78       History   Smoking Status     Former     Packs/day: 0.00     Years: 40.00     Types: Cigarettes     Quit date: 2/9/2020   Smokeless Tobacco     Never       Labs:  Lab Results   Component Value Date    A1C 6.7 10/02/2023     Lab Results   Component Value Date     10/02/2023     09/29/2022    GLC 99 04/05/2021     Lab Results   Component Value Date    LDL 59 10/02/2023    LDL 41 04/05/2021     HDL Cholesterol   Date Value Ref Range Status   04/05/2021 38 (L) >39 mg/dL Final     Direct Measure HDL   Date Value Ref Range Status   10/02/2023 35 (L) >=40 mg/dL Final   ]  GFR Estimate   Date Value Ref Range Status   10/02/2023 >90 >60 mL/min/1.73m2 Final   06/02/2021 >60 >60 mL/min/1.73m2 Final   04/05/2021 >90 >60 mL/min/[1.73_m2] Final     Comment:     Non  GFR Calc  Starting 12/18/2018, serum creatinine based estimated GFR (eGFR) will be   calculated using the Chronic Kidney Disease Epidemiology Collaboration   (CKD-EPI) equation.       GFR Estimate If Black   Date Value Ref Range Status   06/02/2021 >60 >60 mL/min/1.73m2 Final   04/05/2021 >90 >60 mL/min/[1.73_m2] Final     Comment:      GFR Calc  Starting 12/18/2018, serum creatinine based estimated GFR (eGFR) will be   calculated using the Chronic Kidney Disease Epidemiology Collaboration   (CKD-EPI) equation.       Lab Results   Component Value Date    CR 0.79 10/02/2023    CR 0.78 04/05/2021     No results found for: \"MICROALBUMIN\"    Healthy Eating:  Healthy Eating Assessed Today: Yes  Meals include: Lunch, Dinner, Evening Snack  Breakfast: " coffee with cream  Lunch: sandwich- 12 grain bread with mustard, miricle whip, deli meat with chips  Dinner: meatloaf and vegetables- frozen mixed OR  Snacks: allergy to peanut butter- eats cashew butter OR apple in the evening  Other: sugar-free apple pie  Beverages: Coffee, Water, Milk  Has patient met with a dietitian in the past?: No    Being Active:  Being Active Assessed Today: Yes  Exercise:: Currently not exercising  Barrier to exercise: Physical limitation (knees)    Monitoring:  Monitoring Assessed Today: Yes  Did patient bring glucose meter to appointment? : No  Blood Glucose Meter: Unknown  Times checking blood sugar at home (number): 2  Times checking blood sugar at home (per): Day  Blood glucose trend: No change      Taking Medications:      Taking Medication Assessed Today: No  Current Treatments: None  Problems taking diabetes medications regularly?: No  Diabetes medication side effects?: No    Problem Solving:  Problem Solving Assessed Today: No              Reducing Risks:  Reducing Risks Assessed Today: No  Diabetes Risks: Age over 45 years, Family History, Hyperlipidemia  CAD Risks: Male sex, Sedentary lifestyle    Healthy Coping:  Emotional response to diabetes: Ready to learn  Stage of change: ACTION (Actively working towards change)  Patient Activation Measure Survey Score:      9/21/2020    10:52 AM 9/25/2023    11:34 AM   ANA Score (Last Two)   ANA Raw Score 30 29   Activation Score 56 52.9   ANA Level 3 2         Care Plan and Education Provided:  Care Plan: Diabetes   Updates made by Haley Reed since 11/22/2023 12:00 AM        Problem: HbA1C Not In Goal         Goal: Establish Regular Follow-Ups with PCP         Task: Discuss with PCP the recommended timing for patient's next follow up visit(s)    Responsible User: Haley Reed        Task: Discuss schedule for PCP visits with patient    Responsible User: Haley Reed        Goal: Get HbA1C Level in  Goal         Task: Educate patient on diabetes education self-management topics Completed 11/22/2023   Responsible User: Haley Reed        Task: Educate patient on benefits of regular glucose monitoring Completed 11/22/2023   Responsible User: Haley Reed        Task: Refer patient to appropriate extended care team member, as needed (Medication Therapy Management, Behavioral Health, Physical Therapy, etc.)    Responsible User: Haley Reed        Task: Discuss diabetes treatment plan with patient Completed 11/22/2023   Responsible User: Haley Reed        Problem: Diabetes Self-Management Education Needed to Optimize Self-Care Behaviors         Goal: Understand diabetes pathophysiology and disease progression         Task: Provide education on diabetes pathophysiology and disease progression specfic to patient's diabetes type Completed 11/22/2023   Responsible User: Haley Reed        Goal: Healthy Eating - follow a healthy eating pattern for diabetes         Task: Provide education on portion control and consistency in amount, composition and timing of food intake Completed 11/22/2023   Responsible User: Haley Reed        Task: Provide education on managing carbohydrate intake (carbohydrate counting, plate planning method, etc.) Completed 11/22/2023   Responsible User: Haley Reed        Task: Provide education on weight management    Responsible User: Haley Reed        Task: Provide education on heart healthy eating    Responsible User: Haley Reed        Task: Provide education on eating out    Responsible User: Haley Reed        Task: Develop individualized healthy eating plan with patient    Responsible User: Haley Reed        Goal: Being Active - get regular physical activity, working up to at least 150 minutes per week         Task: Provide education on  relationship of activity to glucose and precautions to take if at risk for low glucose Completed 11/22/2023   Responsible User: Haley Reed        Task: Discuss barriers to physical activity with patient Completed 11/22/2023   Responsible User: Haley Reed        Task: Develop physical activity plan with patient    Responsible User: Haley Reed        Task: Explore community resources including walking groups, assistance programs, and home videos    Responsible User: Haley Reed        Goal: Monitoring - monitor glucose and ketones as directed    Start Date: 11/22/2023   This Visit's Progress: 40%   Note:    Begin testing BG 1-2 times daily       Task: Provide education on blood glucose monitoring (purpose, proper technique, frequency, glucose targets, interpreting results, when to use glucose control solution, sharps disposal) Completed 11/22/2023   Responsible User: Haley Reed        Task: Provide education on continuous glucose monitoring (sensor placement, use of yousif or /reader, understanding glucose trends, alerts and alarms, differences between sensor glucose and blood glucose)    Responsible User: Haley Reed        Task: Provide education on ketone monitoring (when to monitor, frequency, etc.)    Responsible User: Haley Reed        Goal: Taking Medication - patient is consistently taking medications as directed         Task: Provide education on action of prescribed medication, including when to take and possible side effects    Responsible User: Haley Reed        Task: Provide education on insulin and injectable diabetes medications, including administration, storage, site selection and rotation for injection sites    Responsible User: Haley Reed        Task: Discuss barriers to medication adherence with patient and provide management technique ideas as appropriate    Responsible  User: Haley Reed        Task: Provide education on frequency and refill details of medications    Responsible User: Haley Reed        Goal: Problem Solving - know how to prevent and manage short-term diabetes complications         Task: Provide education on high blood glucose - causes, signs/symptoms, prevention and treatment    Responsible User: Haley Reed        Task: Provide education on low blood glucose - causes, signs/symptoms, prevention, treatment, carrying a carbohydrate source at all times, and medical identification    Responsible User: Haley Reed        Task: Provide education on safe travel with diabetes    Responsible User: Haley Reed        Task: Provide education on how to care for diabetes on sick days    Responsible User: Haley Reed        Task: Provide education on when to call a health care provider    Responsible User: Haley Reed        Goal: Reducing Risks - know how to prevent and treat long-term diabetes complications         Task: Provide education on major complications of diabetes, prevention, early diagnostic measures and treatment of complications    Responsible User: Haley Reed        Task: Provide education on recommended care for dental, eye and foot health    Responsible User: Haley Reed        Task: Provide education on Hemoglobin A1c - goals and relationship to blood glucose levels Completed 11/22/2023   Responsible User: Haley Reed        Task: Provide education on recommendations for heart health - lipid levels and goals, blood pressure and goals, and aspirin therapy, if indicated    Responsible User: Haley Reed        Task: Provide education on tobacco cessation    Responsible User: Haley Reed        Goal: Healthy Coping - use available resources to cope with the challenges of managing diabetes         Task:  Discuss recognizing feelings about having diabetes Completed 11/22/2023   Responsible User: Haley Reed        Task: Provide education on the benefits of making appropriate lifestyle changes    Responsible User: Haley Reed        Task: Provide education on benefits of utilizing support systems    Responsible User: Haley Reed        Task: Discuss methods for coping with stress    Responsible User: Haley Reed        Task: Provide education on when to seek professional counseling    Responsible User: Haley Reed RDN, LD, [facility]  Outpatient Diabetes Education  Adult Diabetes Education Triage [phone]      Time Spent: 52 minutes  Encounter Type: Individual    Any diabetes medication dose changes were made via the CDE Protocol per the patient's referring provider. A copy of this encounter was shared with the provider.

## 2023-12-20 ENCOUNTER — TELEPHONE (OUTPATIENT)
Dept: PHYSICAL MEDICINE AND REHAB | Facility: CLINIC | Age: 68
End: 2023-12-20
Payer: COMMERCIAL

## 2023-12-20 DIAGNOSIS — F40.240 CLAUSTROPHOBIA: Primary | ICD-10-CM

## 2023-12-20 RX ORDER — DIAZEPAM 5 MG
TABLET ORAL
Qty: 1 TABLET | Refills: 0 | Status: SHIPPED | OUTPATIENT
Start: 2023-12-20 | End: 2024-02-01

## 2023-12-20 NOTE — TELEPHONE ENCOUNTER
[facility] Call Center    Phone Message    May a detailed message be left on voicemail: yes     Reason for Call: Other: Patient calling to request medication script for sedation for MRI     Action Taken: Other: MPSP    Travel Screening: Not Applicable

## 2023-12-20 NOTE — TELEPHONE ENCOUNTER
Phone call to patient to inform that prescription was authorized and has been sent to their pharmacy. Encouraged pt to call nurse navigation line if questions or concerns arise.

## 2023-12-20 NOTE — TELEPHONE ENCOUNTER
Phone call to patient to discuss. Left message to return call.     Schedule reviewed. It appears he attempted to have MRI today at [facility], but machine was too small. Needs to reschedule to a larger machine ([facility]). Patient had mentioned then that he preferred sedation. Documented in cancelled appointment.

## 2023-12-26 RX ORDER — LORAZEPAM 0.5 MG/1
TABLET ORAL
Qty: 1 TABLET | Refills: 0 | Status: SHIPPED | OUTPATIENT
Start: 2023-12-26 | End: 2024-02-01

## 2023-12-26 NOTE — TELEPHONE ENCOUNTER
Call placed to pt with provider's response. Pt stated understanding and states he will reschedule with Lorazepam. He will call with any questions/concerns.

## 2023-12-26 NOTE — TELEPHONE ENCOUNTER
Pt called back. He reports the medication sent was the same medication that was sent before his last MRI and he did not do well with this.   He states he did speak with [facility] and they advised he schedule at [facility] as they have a larger machine, but he inquires if there are any other sedation options (more medication) for him to trial also.

## 2024-01-18 ENCOUNTER — HOSPITAL ENCOUNTER (OUTPATIENT)
Dept: MRI IMAGING | Facility: HOSPITAL | Age: 69
Discharge: HOME | End: 2024-01-18
Attending: PAIN MEDICINE | Admitting: PAIN MEDICINE
Payer: COMMERCIAL

## 2024-01-18 PROCEDURE — 72141 MRI NECK SPINE W/O DYE: CPT | Mod: MG

## 2024-01-19 ENCOUNTER — TELEPHONE (OUTPATIENT)
Dept: PHYSICAL MEDICINE AND REHAB | Facility: CLINIC | Age: 69
End: 2024-01-19
Payer: COMMERCIAL

## 2024-01-19 NOTE — TELEPHONE ENCOUNTER
----- Message from Ananda Cruz, DO sent at 1/19/2024  7:19 AM CST -----  Please call the patient and let him know I reviewed his MRI of the cervical spine which does show wear-and-tear changes including arthritis.  I would recommend left C5 and C6 medial branch blocks on the left.  If he would like this please have him scheduled.  If he would like to discuss treatment plan and look an MRI with me I be happy to do this with him as well.

## 2024-01-19 NOTE — TELEPHONE ENCOUNTER
Pt returned call. Results and recommendations given. Pt stated understanding. Pt wanting to return to clinic to see Dr. Cruz to review imaging and discuss treatment plan. Transferred pt to scheduling to make appt.

## 2024-01-27 ENCOUNTER — HEALTH MAINTENANCE LETTER (OUTPATIENT)
Age: 69
End: 2024-01-27

## 2024-02-01 ENCOUNTER — OFFICE VISIT (OUTPATIENT)
Dept: PHYSICAL MEDICINE AND REHAB | Facility: CLINIC | Age: 69
End: 2024-02-01
Payer: COMMERCIAL

## 2024-02-01 VITALS — OXYGEN SATURATION: 98 % | SYSTOLIC BLOOD PRESSURE: 148 MMHG | HEART RATE: 60 BPM | DIASTOLIC BLOOD PRESSURE: 84 MMHG

## 2024-02-01 DIAGNOSIS — M47.812 CERVICAL SPONDYLOSIS WITHOUT MYELOPATHY: ICD-10-CM

## 2024-02-01 DIAGNOSIS — G71.09 HEREDITARY PROGRESSIVE MUSCULAR DYSTROPHY (H): ICD-10-CM

## 2024-02-01 DIAGNOSIS — M17.12 PRIMARY OSTEOARTHRITIS OF LEFT KNEE: Primary | ICD-10-CM

## 2024-02-01 PROCEDURE — 99214 OFFICE O/P EST MOD 30 MIN: CPT | Performed by: PAIN MEDICINE

## 2024-02-01 ASSESSMENT — PAIN SCALES - GENERAL: PAINLEVEL: MILD PAIN (3)

## 2024-02-01 NOTE — LETTER
2/1/2024         RE: Manuel Schofield  [address]  [facility] [address]        Dear Colleague,    Thank you for referring your patient, Manuel Schofield, to the [facility]. Please see a copy of my visit note below.      Assessment:     Diagnoses and all orders for this visit:  Primary osteoarthritis of left knee  -     PAIN US Large Joint Injection Unilateral; Future  Cervical spondylosis without myelopathy  Hereditary progressive muscular dystrophy (H)     Manuel Schofield is a 68 year old y.o. male with past medical history significant for  tinnitus, GERD, Crohn's, obesity, postoperative hypothyroidism, type 2 diabetes, hyperlipidemia, muscular dystrophy who presents today for follow-up regarding left greater than right neck pain:     -Patient's pain is likely secondary to cervical spondylosis without myelopathy.  He did not tolerate MRI very well and we are I will to only get sagittal views.  Chronic left knee pain secondary to left knee osteoarthritis.    Plan:     A shared decision making plan was used.  The patient's values and choices were respected. Prior medical records from our last visit on 11/15/2023 were reviewed today. The following represents what was discussed and decided upon by the provider and the patient.     -DIAGNOSTIC TESTS: Images were personally reviewed and interpreted.   --MRI of the cervical spine dated 11/3/2004 report is reviewed and does show type II hyperintensity within the cervical spinal cord consistent with history of patient's syrinx from C4-C6.  There is also disc height narrowing at C4-5, C5-6 and C6-7.  -- X-ray of cervical spine dated 4/20/2023 is personally reviewed images interpreted and discussed with the patient.  Does show mild reversal of usual cervical lordosis with slight anterolisthesis of C4 on C5.  There is moderate interspace and endplate degeneration at C5-6 and C6-7.  Suspected facet joint ankylosis at C2-3 through C4-5.  -- MRI  of cervical spine dated 12/20/2023 is personally reviewed images interpreted and discussed with patient.  This is a limited MRI secondary to patient not tolerating MRI machine.  Only sagittal T2 weighted series is obtained.  There is ankylosis present in the C2-C4 segments.  There is severe degenerative disc disease from C5-6, C6-7 and C7-T1.  This is worsened.  There is multilevel facet arthropathy.  Severe foraminal stenosis at C5-6 and C6-7.  There is a suspected cord signal abnormality from C3-4 through C6-7 that is roughly similar to prior images.  -- Left knee x-ray dated 6/9/2020 is personally reviewed images interpreted and discussed with patient.  There is complete loss of the medial compartment of the joint space as well as lateral compartment joint space being preserved with a small marginal osteophyte formation.  There is moderate patellofemoral degenerative changes.   -INTERVENTIONS: The patient wishes to have his knee injections done here now as it is closer.  I ordered a left knee ultrasound-guided injection.    -MEDICATIONS: No changes to medications.  -  Discussed side effects of medications and proper use. Patient verbalized understanding.    -PHYSICAL THERAPY: Recommend that he continue with home exercises on a consistent basis.    -PATIENT EDUCATION: We discussed pain management in a multiple to fashion including physical therapy, medication management, possible future injections.    -FOLLOW UP: Patient will follow-up 2 to 4 weeks after injection.  Advised to contact clinic if symptoms worsen or change.    Subjective:     Manuel Schofield is a 68 year old male who presents today for follow-up regarding left greater than right neck pain and left knee pain.  Patient notes that his neck pain at this time seems to be tolerable and he is not wishing to move forward with any other procedures.  He does note that his left knee pain is worsening at this time.  He had a left knee injection done on 12/14/2022  with near 100% relief until about 3 weeks ago.  He would like to have injections done here as it is much closer than where he typically gets them.  He denies any bowel or bladder changes, fevers, chills, unintentional weight loss.    No myelopathic symptoms.     -Treatment to Date: MRI of cervical spine dated 11/3/2004.  X-ray of cervical spine dated 4/20/2023.  8 sessions of physical therapy that are recent.  MRI of cervical spine dated 12/20/2023.    Patient Active Problem List   Diagnosis     Hereditary progressive muscular dystrophy (H)     [facility]     Advanced directives, counseling/discussion     Family history of prostate cancer     Postoperative hypothyroidism     Gastroesophageal reflux disease without esophagitis     Crohn's disease of small intestine without complication (H)     Morbid obesity (H)     Type 2 diabetes mellitus with hyperglycemia (H)     Hyperlipidemia LDL goal <100       Current Outpatient Medications   Medication     atorvastatin (LIPITOR) 40 MG tablet     balsalazide (COLAZAL) 750 MG capsule     blood glucose (NO BRAND SPECIFIED) test strip     blood glucose monitoring (NO BRAND SPECIFIED) meter device kit     famotidine (PEPCID) 40 MG tablet     levothyroxine (SYNTHROID/LEVOTHROID) 150 MCG tablet     lisinopril (ZESTRIL) 10 MG tablet     thin (NO BRAND SPECIFIED) lancets     Current Facility-Administered Medications   Medication     methylPREDNISolone (DEPO-MEDROL) injection 40 mg       Allergies   Allergen Reactions     Peanut-Containing Drug Products      Seasonal Allergies        Past Medical History:   Diagnosis Date     Acid reflux      Chest pain, unspecified 10/01/2004    adenosine cardiolyte:ECG negative, no ischemia, EF 59%     Coronary artery disease      Crohn's disease (H)      Crohn's disease (H)      Esophageal reflux     EGD 2005 normal     Hereditary progressive muscular dystrophy (H)      HTN (hypertension)      Hypertension      Hypothyroidism       Internal hemorrhoids without mention of complication      Motor neuron disease (H)      Type 2 diabetes mellitus with hyperglycemia (H) 10/16/2023     Unspecified tinnitus     chronic, ENT evaluation        Review of Systems  ROS: Specifically negative for bowel/bladder dysfunction, balance changes, headache, dizziness, foot drop, fevers, chills, appetite changes, nausea/vomiting, unexplained weight loss. Otherwise 13 systems reviewed are negative. Please see the patient's intake questionnaire from today for details.    Reviewed Social, Family, Past Medical and Past Surgical history with patient, no significant changes noted since prior visit.     Objective:     BP (!) 148/84   Pulse 60   SpO2 98%     PHYSICAL EXAMINATION:   --CONSTITUTIONAL: Vital signs as above. No acute distress. The patient is well nourished and well groomed.  --PSYCHIATRIC:  The patient is awake, alert, oriented to person, place, time and answering questions appropriately with clear speech. Appropriate mood and affect   --HEENT: Sclera are non-injected.   --SKIN: Skin over the face is clean, dry, intact without rashes.  --RESPIRATORY: Normal rhythm and effort. No abnormal accessory muscle breathing patterns noted.   --GROSS MOTOR: Easily arises from a seated position.   --CERVICAL SPINE: Inspection reveals no evidence of deformity. Range of motion is mildly limited in cervical flexion, extension, lateral rotation.    --UPPER EXTREMITY MOTOR TESTING:  Wrist flexion left 1/5, right 1/5  Wrist extension left 2/5, right 2/5  Pronators left 5/5, right 5/5  Biceps left 5/5, right 5/5   Triceps left 5/5, right 5/5   Shoulder abduction left 5/5, right 5/5   left 1/5, right 1/5  --NEUROLOGIC: Sensation to upper extremities is intact.   --VASCULAR: Warm upper limbs bilaterally.     Imaging of the cervical spine: Cervical spine imaging was reviewed today. The images were shown to the patient and the findings were explained using a spine  model.    MR Cervical Spine w/o Contrast    Result Date: 1/18/2024  EXAM: MR CERVICAL SPINE W/O CONTRAST LOCATION: [facility] DATE: 1/18/2024 INDICATION: DDD (degenerative disc disease), cervical. Cervical spondylosis without myelopathy. COMPARISON: Cervical spine MRI 10/19/2004. TECHNIQUE: The patient was unable to tolerate the complete anticipated exam. Limited MRI Cervical Spine without IV contrast. The only diagnostic sequence acquired is a sagittal T2-weighted series. FINDINGS: Alignment is significant for reversal of the normal cervical lordosis and multilevel subtle grade 1 spondylolisthesis. Ankylosis is present at C2-C4. Severe degenerative disc disease at C5-C6, C6-C7, and C7-T1, worsened compared to the prior. Multilevel facet arthropathy. Mild spinal canal stenoses at multiple levels. Foramina are poorly characterized; however, suspect severe foraminal stenoses at C5-C6 and C6-C7. The cord demonstrates volume loss and suspected cord signal abnormality at approximately C3-C4 through C6-C7, incompletely characterized, roughly similar in appearance compared to the prior. Small cerebellar infarcts noted. Mild paranasal sinus mucosal thickening with possible right maxillary sinus retention cyst. Slightly lobulated appearance versus thickening involving the epiglottis, presumably related to motion artifact.     IMPRESSION: 1.  Limited study with single diagnostic series acquired. 2.  Degenerative change and cord abnormalities as detailed. 3.  Repeat attempt could be considered.                     Again, thank you for allowing me to participate in the care of your patient.        Sincerely,        Ananda Cruz, DO

## 2024-02-01 NOTE — PATIENT INSTRUCTIONS
[facility] Injection Requirements    A  is required for all fluoroscopically-guided injections.  Injection appointments may be cancelled if there are signs/symptoms of an active infection or if the patient is being actively treated with antibiotics for a diagnosed infection.  Patients may have their steroid injection cancelled if they have had another steroid injection within 2 weeks.  Diabetic patients will have their blood glucose levels checked the day of their injection and the appointment will be rescheduled if the blood glucose level is 300 or higher.  Patients with allergies to cortisone, local anesthetics, iodine, or contrast dye should contact the Spine Center to further discuss these considerations.  Patients scheduled for medial branch block diagnostic injections should refrain from taking pain medication the day of the procedure.  The medial branch block injection appointment will be rescheduled if the patient's pain rating is not 5/10 or greater at the time of the procedure.  Patients taking warfarin/Coumadin will have their INR checked the day of the procedure and the procedure may be rescheduled if the INR is greater than 3.0.  Please contact the Spine Center [phone]) if you are taking any prescription blood-thinning medications (aspirin, warfarin, Plavix, Lovenox, Eliquis, Brilinta, Effient, etc.) as special dosing adjustments may need to be made depending on the type of injection you are scheduled to receive.  It is recommended that you delay having your steroid injection if you have received any vaccines within 2 weeks.    ~Please call Nurse Navigation line [phone] with any questions or concerns about your treatment plan, if symptoms worsen and you would like to be seen urgently, or if you have problems controlling bladder and bowel function.    
no concerns
86

## 2024-02-01 NOTE — PROGRESS NOTES
Assessment:     Diagnoses and all orders for this visit:  Primary osteoarthritis of left knee  -     PAIN US Large Joint Injection Unilateral; Future  Cervical spondylosis without myelopathy  Hereditary progressive muscular dystrophy (H)     Manuel Schofield is a 68 year old y.o. male with past medical history significant for  tinnitus, GERD, Crohn's, obesity, postoperative hypothyroidism, type 2 diabetes, hyperlipidemia, muscular dystrophy who presents today for follow-up regarding left greater than right neck pain:     -Patient's pain is likely secondary to cervical spondylosis without myelopathy.  He did not tolerate MRI very well and we are I will to only get sagittal views.  Chronic left knee pain secondary to left knee osteoarthritis.    Plan:     A shared decision making plan was used.  The patient's values and choices were respected. Prior medical records from our last visit on 11/15/2023 were reviewed today. The following represents what was discussed and decided upon by the provider and the patient.     -DIAGNOSTIC TESTS: Images were personally reviewed and interpreted.   --MRI of the cervical spine dated 11/3/2004 report is reviewed and does show type II hyperintensity within the cervical spinal cord consistent with history of patient's syrinx from C4-C6.  There is also disc height narrowing at C4-5, C5-6 and C6-7.  -- X-ray of cervical spine dated 4/20/2023 is personally reviewed images interpreted and discussed with the patient.  Does show mild reversal of usual cervical lordosis with slight anterolisthesis of C4 on C5.  There is moderate interspace and endplate degeneration at C5-6 and C6-7.  Suspected facet joint ankylosis at C2-3 through C4-5.  -- MRI of cervical spine dated 12/20/2023 is personally reviewed images interpreted and discussed with patient.  This is a limited MRI secondary to patient not tolerating MRI machine.  Only sagittal T2 weighted series is obtained.  There is ankylosis present  in the C2-C4 segments.  There is severe degenerative disc disease from C5-6, C6-7 and C7-T1.  This is worsened.  There is multilevel facet arthropathy.  Severe foraminal stenosis at C5-6 and C6-7.  There is a suspected cord signal abnormality from C3-4 through C6-7 that is roughly similar to prior images.  -- Left knee x-ray dated 6/9/2020 is personally reviewed images interpreted and discussed with patient.  There is complete loss of the medial compartment of the joint space as well as lateral compartment joint space being preserved with a small marginal osteophyte formation.  There is moderate patellofemoral degenerative changes.   -INTERVENTIONS: The patient wishes to have his knee injections done here now as it is closer.  I ordered a left knee ultrasound-guided injection.    -MEDICATIONS: No changes to medications.  -  Discussed side effects of medications and proper use. Patient verbalized understanding.    -PHYSICAL THERAPY: Recommend that he continue with home exercises on a consistent basis.    -PATIENT EDUCATION: We discussed pain management in a multiple to fashion including physical therapy, medication management, possible future injections.    -FOLLOW UP: Patient will follow-up 2 to 4 weeks after injection.  Advised to contact clinic if symptoms worsen or change.    Subjective:     Manuel Schofield is a 68 year old male who presents today for follow-up regarding left greater than right neck pain and left knee pain.  Patient notes that his neck pain at this time seems to be tolerable and he is not wishing to move forward with any other procedures.  He does note that his left knee pain is worsening at this time.  He had a left knee injection done on 12/14/2022 with near 100% relief until about 3 weeks ago.  He would like to have injections done here as it is much closer than where he typically gets them.  He denies any bowel or bladder changes, fevers, chills, unintentional weight loss.    No myelopathic  symptoms.     -Treatment to Date: MRI of cervical spine dated 11/3/2004.  X-ray of cervical spine dated 4/20/2023.  8 sessions of physical therapy that are recent.  MRI of cervical spine dated 12/20/2023.    Patient Active Problem List   Diagnosis    Hereditary progressive muscular dystrophy (H)    Tinnitus    Health Care Home    Advanced directives, counseling/discussion    Family history of prostate cancer    Postoperative hypothyroidism    Gastroesophageal reflux disease without esophagitis    Crohn's disease of small intestine without complication (H)    Morbid obesity (H)    Type 2 diabetes mellitus with hyperglycemia (H)    Hyperlipidemia LDL goal <100       Current Outpatient Medications   Medication    atorvastatin (LIPITOR) 40 MG tablet    balsalazide (COLAZAL) 750 MG capsule    blood glucose (NO BRAND SPECIFIED) test strip    blood glucose monitoring (NO BRAND SPECIFIED) meter device kit    famotidine (PEPCID) 40 MG tablet    levothyroxine (SYNTHROID/LEVOTHROID) 150 MCG tablet    lisinopril (ZESTRIL) 10 MG tablet    thin (NO BRAND SPECIFIED) lancets     Current Facility-Administered Medications   Medication    methylPREDNISolone (DEPO-MEDROL) injection 40 mg       Allergies   Allergen Reactions    Peanut-Containing Drug Products     Seasonal Allergies        Past Medical History:   Diagnosis Date    Acid reflux     Chest pain, unspecified 10/01/2004    adenosine cardiolyte:ECG negative, no ischemia, EF 59%    Coronary artery disease     Crohn's disease (H)     Crohn's disease (H)     Esophageal reflux     EGD 2005 normal    Hereditary progressive muscular dystrophy (H)     HTN (hypertension)     Hypertension     Hypothyroidism     Internal hemorrhoids without mention of complication     Motor neuron disease (H)     Type 2 diabetes mellitus with hyperglycemia (H) 10/16/2023    Unspecified tinnitus     chronic, ENT evaluation        Review of Systems  ROS: Specifically negative for bowel/bladder dysfunction,  balance changes, headache, dizziness, foot drop, fevers, chills, appetite changes, nausea/vomiting, unexplained weight loss. Otherwise 13 systems reviewed are negative. Please see the patient's intake questionnaire from today for details.    Reviewed Social, Family, Past Medical and Past Surgical history with patient, no significant changes noted since prior visit.     Objective:     BP (!) 148/84   Pulse 60   SpO2 98%     PHYSICAL EXAMINATION:   --CONSTITUTIONAL: Vital signs as above. No acute distress. The patient is well nourished and well groomed.  --PSYCHIATRIC:  The patient is awake, alert, oriented to person, place, time and answering questions appropriately with clear speech. Appropriate mood and affect   --HEENT: Sclera are non-injected.   --SKIN: Skin over the face is clean, dry, intact without rashes.  --RESPIRATORY: Normal rhythm and effort. No abnormal accessory muscle breathing patterns noted.   --GROSS MOTOR: Easily arises from a seated position.   --CERVICAL SPINE: Inspection reveals no evidence of deformity. Range of motion is mildly limited in cervical flexion, extension, lateral rotation.    --UPPER EXTREMITY MOTOR TESTING:  Wrist flexion left 1/5, right 1/5  Wrist extension left 2/5, right 2/5  Pronators left 5/5, right 5/5  Biceps left 5/5, right 5/5   Triceps left 5/5, right 5/5   Shoulder abduction left 5/5, right 5/5   left 1/5, right 1/5  --NEUROLOGIC: Sensation to upper extremities is intact.   --VASCULAR: Warm upper limbs bilaterally.     Imaging of the cervical spine: Cervical spine imaging was reviewed today. The images were shown to the patient and the findings were explained using a spine model.    MR Cervical Spine w/o Contrast    Result Date: 1/18/2024  EXAM: MR CERVICAL SPINE W/O CONTRAST LOCATION: [facility] DATE: 1/18/2024 INDICATION: DDD (degenerative disc disease), cervical. Cervical spondylosis without myelopathy. COMPARISON: Cervical spine MRI  10/19/2004. TECHNIQUE: The patient was unable to tolerate the complete anticipated exam. Limited MRI Cervical Spine without IV contrast. The only diagnostic sequence acquired is a sagittal T2-weighted series. FINDINGS: Alignment is significant for reversal of the normal cervical lordosis and multilevel subtle grade 1 spondylolisthesis. Ankylosis is present at C2-C4. Severe degenerative disc disease at C5-C6, C6-C7, and C7-T1, worsened compared to the prior. Multilevel facet arthropathy. Mild spinal canal stenoses at multiple levels. Foramina are poorly characterized; however, suspect severe foraminal stenoses at C5-C6 and C6-C7. The cord demonstrates volume loss and suspected cord signal abnormality at approximately C3-C4 through C6-C7, incompletely characterized, roughly similar in appearance compared to the prior. Small cerebellar infarcts noted. Mild paranasal sinus mucosal thickening with possible right maxillary sinus retention cyst. Slightly lobulated appearance versus thickening involving the epiglottis, presumably related to motion artifact.     IMPRESSION: 1.  Limited study with single diagnostic series acquired. 2.  Degenerative change and cord abnormalities as detailed. 3.  Repeat attempt could be considered.

## 2024-02-06 NOTE — PATIENT INSTRUCTIONS
Keep your scheduled appt for follow up  DISCHARGE INSTRUCTIONS  During office hours (8:00 a.m.- 4:00 p.m.) questions or concerns may be answered  by calling Spine Center Navigation Nurses at  [phone].  Messages received after hours will be returned the following business day.      In the case of an emergency, please dial 911 or seek assistance at the nearest Emergency Room/Urgent Care facility.     You may experience an increase in your symptoms for the first 2 days (It may take anywhere between 2 days- 2 weeks for the steroid to have maximum effect).    You may use ice on the injection site, as frequently as 20 minutes each hour if needed.    You may take your pain medicine.    You may shower. No swimming, tub bath or hot tub for 48 hours.  You may remove your bandaid/bandage as soon as you are home.    You may resume light activities, as tolerated.    Resume your usual diet as tolerated.    POSSIBLE STEROID SIDE EFFECTS (If steroid/cortisone was used for your procedure)  -If you experience these symptoms, it should only last for a short period  Swelling of the legs              Skin redness (flushing)     Mouth (oral) irritation   Blood sugar (glucose) levels            Sweats                    Mood changes  Headache  Sleeplessness  Weakened immune system for up to 14 days, which could increase the risk of ros the COVID-19 virus and/or experiencing more severe symptoms of the disease, if exposed.  Decreased effectiveness of the flu vaccine if given within 2 weeks of the steroid.         POSSIBLE PROCEDURE SIDE EFFECTS  -Call the Spine Center if you are concerned  Increased Pain           Increased numbness/tingling      Nausea/Vomiting          Bruising/bleeding at site      Redness or swelling                                              Difficulty walking      Weakness           Fever greater than 100.5

## 2024-02-07 ENCOUNTER — RADIOLOGY INJECTION OFFICE VISIT (OUTPATIENT)
Dept: PHYSICAL MEDICINE AND REHAB | Facility: CLINIC | Age: 69
End: 2024-02-07
Attending: PAIN MEDICINE
Payer: COMMERCIAL

## 2024-02-07 VITALS
TEMPERATURE: 98.1 F | DIASTOLIC BLOOD PRESSURE: 65 MMHG | SYSTOLIC BLOOD PRESSURE: 134 MMHG | OXYGEN SATURATION: 96 % | HEART RATE: 53 BPM

## 2024-02-07 DIAGNOSIS — M17.12 PRIMARY OSTEOARTHRITIS OF LEFT KNEE: ICD-10-CM

## 2024-02-07 DIAGNOSIS — E11.65 TYPE 2 DIABETES MELLITUS WITH HYPERGLYCEMIA, WITHOUT LONG-TERM CURRENT USE OF INSULIN (H): Primary | ICD-10-CM

## 2024-02-07 LAB — GLUCOSE SERPL-MCNC: 120 MG/DL (ref 70–99)

## 2024-02-07 PROCEDURE — 20611 DRAIN/INJ JOINT/BURSA W/US: CPT | Mod: LT | Performed by: PAIN MEDICINE

## 2024-02-07 PROCEDURE — 82962 GLUCOSE BLOOD TEST: CPT | Performed by: PAIN MEDICINE

## 2024-02-07 RX ORDER — ROPIVACAINE HYDROCHLORIDE 5 MG/ML
INJECTION, SOLUTION EPIDURAL; INFILTRATION; PERINEURAL
Status: COMPLETED | OUTPATIENT
Start: 2024-02-07 | End: 2024-02-07

## 2024-02-07 RX ORDER — LIDOCAINE HYDROCHLORIDE 10 MG/ML
INJECTION, SOLUTION EPIDURAL; INFILTRATION; INTRACAUDAL; PERINEURAL
Status: COMPLETED | OUTPATIENT
Start: 2024-02-07 | End: 2024-02-07

## 2024-02-07 RX ORDER — METHYLPREDNISOLONE ACETATE 40 MG/ML
INJECTION, SUSPENSION INTRA-ARTICULAR; INTRALESIONAL; INTRAMUSCULAR; SOFT TISSUE
Status: COMPLETED | OUTPATIENT
Start: 2024-02-07 | End: 2024-02-07

## 2024-02-07 RX ADMIN — METHYLPREDNISOLONE ACETATE 40 MG: 40 INJECTION, SUSPENSION INTRA-ARTICULAR; INTRALESIONAL; INTRAMUSCULAR; SOFT TISSUE at 08:00

## 2024-02-07 RX ADMIN — ROPIVACAINE HYDROCHLORIDE 4 ML: 5 INJECTION, SOLUTION EPIDURAL; INFILTRATION; PERINEURAL at 08:00

## 2024-02-07 RX ADMIN — LIDOCAINE HYDROCHLORIDE 1 ML: 10 INJECTION, SOLUTION EPIDURAL; INFILTRATION; INTRACAUDAL; PERINEURAL at 08:00

## 2024-02-07 ASSESSMENT — PAIN SCALES - GENERAL
PAINLEVEL: NO PAIN (0)
PAINLEVEL: NO PAIN (0)

## 2024-02-21 ENCOUNTER — OFFICE VISIT (OUTPATIENT)
Dept: PHYSICAL MEDICINE AND REHAB | Facility: CLINIC | Age: 69
End: 2024-02-21
Payer: COMMERCIAL

## 2024-02-21 VITALS — DIASTOLIC BLOOD PRESSURE: 68 MMHG | SYSTOLIC BLOOD PRESSURE: 140 MMHG | HEART RATE: 57 BPM

## 2024-02-21 DIAGNOSIS — M17.12 PRIMARY OSTEOARTHRITIS OF LEFT KNEE: Primary | ICD-10-CM

## 2024-02-21 DIAGNOSIS — G71.09 HEREDITARY PROGRESSIVE MUSCULAR DYSTROPHY (H): ICD-10-CM

## 2024-02-21 DIAGNOSIS — M47.812 CERVICAL SPONDYLOSIS WITHOUT MYELOPATHY: ICD-10-CM

## 2024-02-21 PROCEDURE — 99213 OFFICE O/P EST LOW 20 MIN: CPT | Performed by: PAIN MEDICINE

## 2024-02-21 ASSESSMENT — PAIN SCALES - GENERAL: PAINLEVEL: NO PAIN (0)

## 2024-02-21 NOTE — PATIENT INSTRUCTIONS
~Please call Nurse Navigation line [phone] with any questions or concerns about your treatment plan, if symptoms worsen and you would like to be seen urgently, or if you have problems controlling bladder and bowel function.

## 2024-02-21 NOTE — PROGRESS NOTES
Assessment:     Diagnoses and all orders for this visit:  Primary osteoarthritis of left knee  Cervical spondylosis without myelopathy  Hereditary progressive muscular dystrophy (H)     Manuel Schofield is a 68 year old y.o. male with past medical history significant for tinnitus, GERD, Crohn's, obesity, postoperative hypothyroidism, type 2 diabetes, hyperlipidemia, muscular dystrophy who presents today for follow-up regarding left greater than right neck pain and left knee pain:    -Patient received 90% relief with his left knee injection.  He is feeling much better.     Plan:     A shared decision making plan was used. The patient's values and choices were respected. Prior medical records from our last visit on 2/1/2024 were reviewed today. The following represents what was discussed and decided upon by the provider and the patient.        -DIAGNOSTIC TESTS: Images were personally reviewed and interpreted.   --MRI of the cervical spine dated 11/3/2004 report is reviewed and does show type II hyperintensity within the cervical spinal cord consistent with history of patient's syrinx from C4-C6.  There is also disc height narrowing at C4-5, C5-6 and C6-7.  -- X-ray of cervical spine dated 4/20/2023 is personally reviewed images interpreted and discussed with the patient.  Does show mild reversal of usual cervical lordosis with slight anterolisthesis of C4 on C5.  There is moderate interspace and endplate degeneration at C5-6 and C6-7.  Suspected facet joint ankylosis at C2-3 through C4-5.  -- MRI of cervical spine dated 12/20/2023 is personally reviewed images interpreted and discussed with patient.  This is a limited MRI secondary to patient not tolerating MRI machine.  Only sagittal T2 weighted series is obtained.  There is ankylosis present in the C2-C4 segments.  There is severe degenerative disc disease from C5-6, C6-7 and C7-T1.  This is worsened.  There is multilevel facet arthropathy.  Severe foraminal  stenosis at C5-6 and C6-7.  There is a suspected cord signal abnormality from C3-4 through C6-7 that is roughly similar to prior images.  -- Left knee x-ray dated 6/9/2020 is personally reviewed images interpreted and discussed with patient.  There is complete loss of the medial compartment of the joint space as well as lateral compartment joint space being preserved with a small marginal osteophyte formation.  There is moderate patellofemoral degenerative changes.                -INTERVENTIONS: No interventions at this time.    -MEDICATIONS: No changes to medications.  -  Discussed side effects of medications and proper use. Patient verbalized understanding.    -PHYSICAL THERAPY: Recommended continue with home exercises on a consistent basis.  Discussed the importance of core strengthening, ROM, stretching exercises with the patient and how each of these entities is important in decreasing pain.  Explained to the patient that the purpose of physical therapy is to teach the patient a home exercise program.  These exercises need to be performed every day in order to decrease pain and prevent future occurrences of pain.        -PATIENT EDUCATION: We discussed pain management in a multiple to fashion including physical therapy, medication management, possible future injections.    -FOLLOW UP: Patient will follow-up as needed.  Advised to contact clinic if symptoms worsen or change.    Subjective:     Manuel Schofield is a 68 year old male who presents today for follow-up regarding chronic left knee pain.  Patient notes he received 90% relief with his knee injection.  He is very pleased with this.  He and his wife went to [place] and he is able to walk all around the city and did quite well without having pain.  He notes that his stability is much better.  He denies any bowel or bladder changes, fevers, chills, unintentional weight loss.    -Treatment to Date: MRI of cervical spine dated 11/3/2004.  X-ray of cervical  spine dated 4/20/2023.  8 sessions of physical therapy that are recent.  MRI of cervical spine dated 12/20/2023.  Ultrasound-guided left knee injection on 2/7/2024.    Patient Active Problem List   Diagnosis    Hereditary progressive muscular dystrophy (H)    Tinnitus    Health Care Home    Advanced directives, counseling/discussion    Family history of prostate cancer    Postoperative hypothyroidism    Gastroesophageal reflux disease without esophagitis    Crohn's disease of small intestine without complication (H)    Morbid obesity (H)    Type 2 diabetes mellitus with hyperglycemia (H)    Hyperlipidemia LDL goal <100       Current Outpatient Medications   Medication    atorvastatin (LIPITOR) 40 MG tablet    balsalazide (COLAZAL) 750 MG capsule    blood glucose (NO BRAND SPECIFIED) test strip    blood glucose monitoring (NO BRAND SPECIFIED) meter device kit    famotidine (PEPCID) 40 MG tablet    levothyroxine (SYNTHROID/LEVOTHROID) 150 MCG tablet    lisinopril (ZESTRIL) 10 MG tablet    thin (NO BRAND SPECIFIED) lancets     Current Facility-Administered Medications   Medication    methylPREDNISolone (DEPO-MEDROL) injection 40 mg       Allergies   Allergen Reactions    Peanut-Containing Drug Products     Seasonal Allergies        Past Medical History:   Diagnosis Date    Acid reflux     Chest pain, unspecified 10/01/2004    adenosine cardiolyte:ECG negative, no ischemia, EF 59%    Coronary artery disease     Crohn's disease (H)     Crohn's disease (H)     Esophageal reflux     EGD 2005 normal    Hereditary progressive muscular dystrophy (H)     HTN (hypertension)     Hypertension     Hypothyroidism     Internal hemorrhoids without mention of complication     Motor neuron disease (H)     Type 2 diabetes mellitus with hyperglycemia (H) 10/16/2023    Unspecified tinnitus     chronic, ENT evaluation        Review of Systems  ROS:  Specifically negative for bowel/bladder dysfunction, balance changes, headache, dizziness,  foot drop, fevers, chills, appetite changes, nausea/vomiting, unexplained weight loss. Otherwise 13 systems reviewed are negative. Please see the patient's intake questionnaire from today for details.    Reviewed Social, Family, Past Medical and Past Surgical history with patient, no significant changes noted since prior visit.     Objective:     BP (!) 140/68   Pulse 57     PHYSICAL EXAMINATION:    --CONSTITUTIONAL: Well developed, well nourished, healthy appearing individual.  --PSYCHIATRIC: Appropriate mood and affect. No difficulty interacting due to temper, social withdrawal, or memory issues.  --RESPIRATORY: Normal rhythm and effort. No abnormal accessory muscle breathing patterns noted.   --MUSCULOSKELETAL:  Normal lumbar lordosis noted, no lateral shift.  --GROSS MOTOR: Easily arises from a seated position. Gait is non-antalgic  --LUMBAR SPINE:  Inspection reveals no evidence of deformity.  --SACROILIAC JOINT:  One Finger point test negative.  --NEUROLOGIC: Sensation to light touch is intact in the bilateral L4, L5, and S1 dermatomes.    RESULTS:   Imaging: Cervical spine and knee imaging was reviewed today.     PAIN US Large Joint Injection Unilateral    Result Date: 2/7/2024  Procedure: Ultrasound guided left knee injection Pre Procedure Diagnosis: Left knee osteoarthritis Post Procedure Diagnosis:  Same Procedure Performed: Left knee injection with Ultrasound Guidance Clinical Scenario:  As per office notes Vital Signs:  As per rooming/preprocedure documentation Side Injected: Left After discussing the risks, benefits, and alternatives to the procedure, the patient expressed understanding and wished to proceed.  The patient was brought to the procedure suite and placed in the supine position.  A procedural pause was conducted to verify:  correct patient identity, procedure to be performed and as applicable, correct side and site, correct patient position, and availability of implants, special equipment  or special requirements.  A simple surgical tray was used.  Prior to the procedure, the left knee was examined with a 12 MHz linear transducer to visualize the left knee and determine the optimal needle path. Following this, the area was prepared with a ChloraPrep scrub, then re-examined using the same transducer, a sterile ultrasound transducer cover, and sterile ultrasound transducer gel.  Local anesthesia was obtained with 1 cc of 1% lidocaine. Thereafter, using ultrasound guidance, a 2-inch 25-gauge needle was advanced into the left knee joint in the suprapatellar recess of the joint space. After visualization of the tip and negative aspiration for blood, a mixture of 40 mg of Depo-Medrol and 4 cc of 0.5% ropivacaine was injected into the left knee joint. Following the injection, the needle was withdrawn.  The patient tolerated the procedure well and there were no apparent complications.  After an appropriate amount of observation, the patient was dismissed from the clinic in good condition under their own power.  Images were stored. Pre-Pain Score:  0/10 Post-Pain Score:  0/10 The patient will follow up with Dr. Cruz in 2-4 weeks.

## 2024-02-21 NOTE — LETTER
2/21/2024         RE: Maneul Schofield  [address]  [facility] [address]        Dear Colleague,    Thank you for referring your patient, Manuel Schofield, to the [facility]. Please see a copy of my visit note below.      Assessment:     Diagnoses and all orders for this visit:  Primary osteoarthritis of left knee  Cervical spondylosis without myelopathy  Hereditary progressive muscular dystrophy (H)     Manuel Schofield is a 68 year old y.o. male with past medical history significant for tinnitus, GERD, Crohn's, obesity, postoperative hypothyroidism, type 2 diabetes, hyperlipidemia, muscular dystrophy who presents today for follow-up regarding left greater than right neck pain and left knee pain:    -Patient received 90% relief with his left knee injection.  He is feeling much better.     Plan:     A shared decision making plan was used. The patient's values and choices were respected. Prior medical records from our last visit on 2/1/2024 were reviewed today. The following represents what was discussed and decided upon by the provider and the patient.        -DIAGNOSTIC TESTS: Images were personally reviewed and interpreted.   --MRI of the cervical spine dated 11/3/2004 report is reviewed and does show type II hyperintensity within the cervical spinal cord consistent with history of patient's syrinx from C4-C6.  There is also disc height narrowing at C4-5, C5-6 and C6-7.  -- X-ray of cervical spine dated 4/20/2023 is personally reviewed images interpreted and discussed with the patient.  Does show mild reversal of usual cervical lordosis with slight anterolisthesis of C4 on C5.  There is moderate interspace and endplate degeneration at C5-6 and C6-7.  Suspected facet joint ankylosis at C2-3 through C4-5.  -- MRI of cervical spine dated 12/20/2023 is personally reviewed images interpreted and discussed with patient.  This is a limited MRI secondary to patient not tolerating MRI  machine.  Only sagittal T2 weighted series is obtained.  There is ankylosis present in the C2-C4 segments.  There is severe degenerative disc disease from C5-6, C6-7 and C7-T1.  This is worsened.  There is multilevel facet arthropathy.  Severe foraminal stenosis at C5-6 and C6-7.  There is a suspected cord signal abnormality from C3-4 through C6-7 that is roughly similar to prior images.  -- Left knee x-ray dated 6/9/2020 is personally reviewed images interpreted and discussed with patient.  There is complete loss of the medial compartment of the joint space as well as lateral compartment joint space being preserved with a small marginal osteophyte formation.  There is moderate patellofemoral degenerative changes.                -INTERVENTIONS: No interventions at this time.    -MEDICATIONS: No changes to medications.  -  Discussed side effects of medications and proper use. Patient verbalized understanding.    -PHYSICAL THERAPY: Recommended continue with home exercises on a consistent basis.  Discussed the importance of core strengthening, ROM, stretching exercises with the patient and how each of these entities is important in decreasing pain.  Explained to the patient that the purpose of physical therapy is to teach the patient a home exercise program.  These exercises need to be performed every day in order to decrease pain and prevent future occurrences of pain.        -PATIENT EDUCATION: We discussed pain management in a multiple to fashion including physical therapy, medication management, possible future injections.    -FOLLOW UP: Patient will follow-up as needed.  Advised to contact clinic if symptoms worsen or change.    Subjective:     Manuel Schofield is a 68 year old male who presents today for follow-up regarding chronic left knee pain.  Patient notes he received 90% relief with his knee injection.  He is very pleased with this.  He and his wife went to [place] and he is able to walk all around the  city and did quite well without having pain.  He notes that his stability is much better.  He denies any bowel or bladder changes, fevers, chills, unintentional weight loss.    -Treatment to Date: MRI of cervical spine dated 11/3/2004.  X-ray of cervical spine dated 4/20/2023.  8 sessions of physical therapy that are recent.  MRI of cervical spine dated 12/20/2023.  Ultrasound-guided left knee injection on 2/7/2024.    Patient Active Problem List   Diagnosis     Hereditary progressive muscular dystrophy (H)     Tinnitus     [facility]     Advanced directives, counseling/discussion     Family history of prostate cancer     Postoperative hypothyroidism     Gastroesophageal reflux disease without esophagitis     Crohn's disease of small intestine without complication (H)     Morbid obesity (H)     Type 2 diabetes mellitus with hyperglycemia (H)     Hyperlipidemia LDL goal <100       Current Outpatient Medications   Medication     atorvastatin (LIPITOR) 40 MG tablet     balsalazide (COLAZAL) 750 MG capsule     blood glucose (NO BRAND SPECIFIED) test strip     blood glucose monitoring (NO BRAND SPECIFIED) meter device kit     famotidine (PEPCID) 40 MG tablet     levothyroxine (SYNTHROID/LEVOTHROID) 150 MCG tablet     lisinopril (ZESTRIL) 10 MG tablet     thin (NO BRAND SPECIFIED) lancets     Current Facility-Administered Medications   Medication     methylPREDNISolone (DEPO-MEDROL) injection 40 mg       Allergies   Allergen Reactions     Peanut-Containing Drug Products      Seasonal Allergies        Past Medical History:   Diagnosis Date     Acid reflux      Chest pain, unspecified 10/01/2004    adenosine cardiolyte:ECG negative, no ischemia, EF 59%     Coronary artery disease      Crohn's disease (H)      Crohn's disease (H)      Esophageal reflux     EGD 2005 normal     Hereditary progressive muscular dystrophy (H)      HTN (hypertension)      Hypertension      Hypothyroidism      Internal hemorrhoids without  mention of complication      Motor neuron disease (H)      Type 2 diabetes mellitus with hyperglycemia (H) 10/16/2023     Unspecified tinnitus     chronic, ENT evaluation        Review of Systems  ROS:  Specifically negative for bowel/bladder dysfunction, balance changes, headache, dizziness, foot drop, fevers, chills, appetite changes, nausea/vomiting, unexplained weight loss. Otherwise 13 systems reviewed are negative. Please see the patient's intake questionnaire from today for details.    Reviewed Social, Family, Past Medical and Past Surgical history with patient, no significant changes noted since prior visit.     Objective:     BP (!) 140/68   Pulse 57     PHYSICAL EXAMINATION:    --CONSTITUTIONAL: Well developed, well nourished, healthy appearing individual.  --PSYCHIATRIC: Appropriate mood and affect. No difficulty interacting due to temper, social withdrawal, or memory issues.  --RESPIRATORY: Normal rhythm and effort. No abnormal accessory muscle breathing patterns noted.   --MUSCULOSKELETAL:  Normal lumbar lordosis noted, no lateral shift.  --GROSS MOTOR: Easily arises from a seated position. Gait is non-antalgic  --LUMBAR SPINE:  Inspection reveals no evidence of deformity.  --SACROILIAC JOINT:  One Finger point test negative.  --NEUROLOGIC: Sensation to light touch is intact in the bilateral L4, L5, and S1 dermatomes.    RESULTS:   Imaging: Cervical spine and knee imaging was reviewed today.     PAIN US Large Joint Injection Unilateral    Result Date: 2/7/2024  Procedure: Ultrasound guided left knee injection Pre Procedure Diagnosis: Left knee osteoarthritis Post Procedure Diagnosis:  Same Procedure Performed: Left knee injection with Ultrasound Guidance Clinical Scenario:  As per office notes Vital Signs:  As per rooming/preprocedure documentation Side Injected: Left After discussing the risks, benefits, and alternatives to the procedure, the patient expressed understanding and wished to proceed.  The  patient was brought to the procedure suite and placed in the supine position.  A procedural pause was conducted to verify:  correct patient identity, procedure to be performed and as applicable, correct side and site, correct patient position, and availability of implants, special equipment or special requirements.  A simple surgical tray was used.  Prior to the procedure, the left knee was examined with a 12 MHz linear transducer to visualize the left knee and determine the optimal needle path. Following this, the area was prepared with a ChloraPrep scrub, then re-examined using the same transducer, a sterile ultrasound transducer cover, and sterile ultrasound transducer gel.  Local anesthesia was obtained with 1 cc of 1% lidocaine. Thereafter, using ultrasound guidance, a 2-inch 25-gauge needle was advanced into the left knee joint in the suprapatellar recess of the joint space. After visualization of the tip and negative aspiration for blood, a mixture of 40 mg of Depo-Medrol and 4 cc of 0.5% ropivacaine was injected into the left knee joint. Following the injection, the needle was withdrawn.  The patient tolerated the procedure well and there were no apparent complications.  After an appropriate amount of observation, the patient was dismissed from the clinic in good condition under their own power.  Images were stored. Pre-Pain Score:  0/10 Post-Pain Score:  0/10 The patient will follow up with Dr. Cruz in 2-4 weeks.                            Again, thank you for allowing me to participate in the care of your patient.        Sincerely,        Ananda Cruz, DO

## 2024-02-22 ENCOUNTER — OFFICE VISIT (OUTPATIENT)
Dept: OPHTHALMOLOGY | Facility: CLINIC | Age: 69
End: 2024-02-22
Attending: OPHTHALMOLOGY
Payer: COMMERCIAL

## 2024-02-22 ENCOUNTER — NURSE TRIAGE (OUTPATIENT)
Dept: NURSING | Facility: CLINIC | Age: 69
End: 2024-02-22
Payer: COMMERCIAL

## 2024-02-22 DIAGNOSIS — H04.129 DRY EYE: ICD-10-CM

## 2024-02-22 DIAGNOSIS — H43.811 PVD (POSTERIOR VITREOUS DETACHMENT), RIGHT: Primary | ICD-10-CM

## 2024-02-22 DIAGNOSIS — H43.822 VITREOMACULAR TRACTION SYNDROME OF LEFT EYE: ICD-10-CM

## 2024-02-22 PROCEDURE — 99204 OFFICE O/P NEW MOD 45 MIN: CPT

## 2024-02-22 PROCEDURE — 99213 OFFICE O/P EST LOW 20 MIN: CPT | Performed by: OPHTHALMOLOGY

## 2024-02-22 ASSESSMENT — CONF VISUAL FIELD
METHOD: COUNTING FINGERS
OS_NORMAL: 1
OD_SUPERIOR_NASAL_RESTRICTION: 0
OD_INFERIOR_TEMPORAL_RESTRICTION: 0
OS_INFERIOR_TEMPORAL_RESTRICTION: 0
OS_SUPERIOR_TEMPORAL_RESTRICTION: 0
OD_NORMAL: 1
OD_INFERIOR_NASAL_RESTRICTION: 0
OS_SUPERIOR_NASAL_RESTRICTION: 0
OD_SUPERIOR_TEMPORAL_RESTRICTION: 0
OS_INFERIOR_NASAL_RESTRICTION: 0

## 2024-02-22 ASSESSMENT — SLIT LAMP EXAM - LIDS
COMMENTS: MGD
COMMENTS: MGD

## 2024-02-22 ASSESSMENT — VISUAL ACUITY
OD_SC+: -2
METHOD: SNELLEN - LINEAR
OS_SC+: -1
OD_SC: 20/25
OS_SC: 20/25

## 2024-02-22 ASSESSMENT — TONOMETRY
OD_IOP_MMHG: 14
IOP_METHOD: TONOPEN
OS_IOP_MMHG: 12

## 2024-02-22 NOTE — TELEPHONE ENCOUNTER
"  Nurse Triage SBAR    Is this a 2nd Level Triage? YES, LICENSED PRACTITIONER REVIEW IS REQUIRED    Situation:  Patient when he shuts his left eye, has a blurry spot in the RIGHT eye, centrally located and comes and goes.  Using Systane drops    Background:   Last Eye exam was at [employer] in [place] in August    Assessment:   denies fall or injury  Denies fever or chills  He does have dry eyes  White of his eye is red at times  He is using Systane drops  He wears readers  Denies swelling to face or eyelids  Denies pain  He does state that he did have a steroid injection into his knee a few weeks back., just wanted to mention that.  He would like to be seen.    Protocol Recommended Disposition:   Routing to EYE for a call back, please use land line,per pt request        Routed to provider        Reason for Disposition   Blurred vision or visual changes and present now and sudden onset or new (e.g., minutes, hours, days)  (Exception: Seeing floaters / black specks OR previously diagnosed migraine headaches with same symptoms.)    Additional Information   Negative: Complete loss of vision in one or both eyes   Negative: SEVERE eye pain    Answer Assessment - Initial Assessment Questions  1. DESCRIPTION: \"How has your vision changed?\" (e.g., complete vision loss, blurred vision, double vision, floaters, etc.)      When he shuts his LEFT eye his RIGHT eye has a blurry spot right in the middle.  Comes and goes x 2 weeks  2. LOCATION: \"One or both eyes?\" If one, ask: \"Which eye?\"      Right eye  3. SEVERITY: \"Can you see anything?\" If Yes, ask: \"What can you see?\" (e.g., fine print)      He can see the clock, needs his cheaters to read a book   4. ONSET: \"When did this begin?\" \"Did it start suddenly or has this been gradual?\"      2 weeks ago  5. PATTERN: \"Does this come and go, or has it been constant since it started?\"      Comes and goes  6. PAIN: \"Is there any pain in your eye(s)?\"  (Scale 1-10; or mild, moderate, " "severe)    - NONE (0): No pain.    - MILD (1-3): Doesn't interfere with normal activities.    - MODERATE (4-7): Interferes with normal activities or awakens from sleep.     - SEVERE (8-10): Excruciating pain, unable to do any normal activities.      No pain  7. CONTACTS-GLASSES: \"Do you wear contacts or glasses?\"      readers  8. CAUSE: \"What do you think is causing this visual problem?\"      unknown  9. OTHER SYMPTOMS: \"Do you have any other symptoms?\" (e.g., confusion, headache, arm or leg weakness, speech problems)      No confusion, no headache,  +arm or leg weakness, has a motor neuron disorder, no speech problems  10. PREGNANCY: \"Is there any chance you are pregnant?\" \"When was your last menstrual period?\"        no    Protocols used: Vision Loss or Change-A-OH    "

## 2024-02-22 NOTE — TELEPHONE ENCOUNTER
Spoke to pt at 1217    Right eye blurred spot in center of vision in same area of vision-- pt states does not notice all the time.    Pt is noticing today during call when closed left eye.    Not changing in past couple weeks.    H/o floaters in left eye    Pt feels more floaters in right eye.    More quick flashing in peripheral vision in dim light also noted in past couple weeks.    Will review scheduling options with retina team and call back    Ta Can RN 12:21 PM 02/22/24

## 2024-02-22 NOTE — PROGRESS NOTES
CC: Diabetic retinopathy     HPI: Blurred vision in the right, 3 weeks ago was first noticed, associated with flash of light when walking in a dark room. Stable now, comes and goes.    PMHx:   DM type II  DL  S/p thyroidectomy  Crohn's disease  Motor neuron disease?, Inability to flex fingers, weakness in arm and forearm       Imaging:    OCT: 02/22/2024  Right eye: Good foveal contour, no IRF/SRF  Left eye: Good foveal contour, no IRF/SRF. VMT     Retina Laser procedures:  none    Intravitreal injections:  none    Assessment/ Plan: 02/22/2024       # Type DM type II, no diabetic retinopathy   - Last Hba1c: 6.7  - No DR changes on exam OU  - No DME on OCT  - recommend good blood glucose control   - yearly follow up    # PVD right eye  - observe  - RD warning signs explained   - patient knows to come in if any change in symptoms    # Vitreomacular traction LEFT eye  - no IRF/SRF on OCT   - Will observe for now      # Dry eye syndrome  - recommend artificial tears OU QID      Follow up in 1 month for DFE , optos photos OU      Attending Physician Attestation:  Complete documentation of historical and exam elements from today's encounter can be found in the full encounter summary report (not reduplicated in this progress note).  I personally obtained the chief complaint(s) and history of present illness.  I confirmed and edited as necessary the review of systems, past medical/surgical history, family history, social history, and examination findings as documented by others; and I examined the patient myself.  I personally reviewed the relevant tests, images, and reports as documented above.  I formulated and edited as necessary the assessment and plan and discussed the findings and management plan with the patient and family. MD Harley Castillo MD     Medical Retina  [facility]

## 2024-02-22 NOTE — TELEPHONE ENCOUNTER
Dr. Souza able to see at 1415 today    Pt states able to arrive around 2 Pm for 2:15 PM appt today at [facility] location.    Pt aware of date/time/location/duration/hospital based clinic/parking ramp/non-[employer] insurance.    Ta Can RN 12:46 PM 02/22/24

## 2024-02-22 NOTE — NURSING NOTE
Chief Complaints and History of Present Illnesses   Patient presents with    Blurred Vision Evaluation     New flashes/floaters and blurred vision.     Chief Complaint(s) and History of Present Illness(es)       Blurred Vision Evaluation              Associated symptoms: dryness, photophobia, flashes and floaters.  Negative for eye pain, trauma and headache    Treatments tried: artificial tears    Pain scale: 0/10    Comments: New flashes/floaters and blurred vision.              Comments    Pt reports new intermittent blurry vision, new flashes and floaters, light sensitivity RE for about 3 weeks.  No change to floaters LE.    AT's PRN.    DM 2 Dx 10/24.  BS were 108 this am.  Lab Results       Component                Value               Date                       A1C                      6.7                 10/02/2023              KENDRICK Dean February 22, 2024 2:26 PM

## 2024-02-28 ENCOUNTER — OFFICE VISIT (OUTPATIENT)
Dept: PEDIATRICS | Facility: CLINIC | Age: 69
End: 2024-02-28
Payer: COMMERCIAL

## 2024-02-28 VITALS
BODY MASS INDEX: 36.54 KG/M2 | HEIGHT: 69 IN | DIASTOLIC BLOOD PRESSURE: 60 MMHG | SYSTOLIC BLOOD PRESSURE: 110 MMHG | RESPIRATION RATE: 14 BRPM | OXYGEN SATURATION: 99 % | WEIGHT: 246.7 LBS | TEMPERATURE: 96 F | HEART RATE: 59 BPM

## 2024-02-28 DIAGNOSIS — E11.65 TYPE 2 DIABETES MELLITUS WITH HYPERGLYCEMIA, UNSPECIFIED WHETHER LONG TERM INSULIN USE (H): Primary | ICD-10-CM

## 2024-02-28 DIAGNOSIS — K50.00 CROHN'S DISEASE OF SMALL INTESTINE WITHOUT COMPLICATION (H): ICD-10-CM

## 2024-02-28 DIAGNOSIS — E66.01 MORBID OBESITY (H): ICD-10-CM

## 2024-02-28 DIAGNOSIS — E78.5 HYPERLIPIDEMIA LDL GOAL <100: ICD-10-CM

## 2024-02-28 LAB
ALBUMIN SERPL BCG-MCNC: 4.1 G/DL (ref 3.5–5.2)
ALBUMIN UR-MCNC: NEGATIVE MG/DL
ALP SERPL-CCNC: 62 U/L (ref 40–150)
ALT SERPL W P-5'-P-CCNC: 10 U/L (ref 0–70)
ANION GAP SERPL CALCULATED.3IONS-SCNC: 10 MMOL/L (ref 7–15)
APPEARANCE UR: CLEAR
AST SERPL W P-5'-P-CCNC: 18 U/L (ref 0–45)
BACTERIA #/AREA URNS HPF: ABNORMAL /HPF
BILIRUB SERPL-MCNC: 0.4 MG/DL
BILIRUB UR QL STRIP: NEGATIVE
BUN SERPL-MCNC: 11.6 MG/DL (ref 8–23)
CALCIUM SERPL-MCNC: 8.5 MG/DL (ref 8.8–10.2)
CHLORIDE SERPL-SCNC: 104 MMOL/L (ref 98–107)
CHOLEST SERPL-MCNC: 97 MG/DL
COLOR UR AUTO: YELLOW
CREAT SERPL-MCNC: 0.79 MG/DL (ref 0.67–1.17)
CREAT UR-MCNC: 215 MG/DL
DEPRECATED HCO3 PLAS-SCNC: 24 MMOL/L (ref 22–29)
EGFRCR SERPLBLD CKD-EPI 2021: >90 ML/MIN/1.73M2
FASTING STATUS PATIENT QL REPORTED: YES
GLUCOSE SERPL-MCNC: 99 MG/DL (ref 70–99)
GLUCOSE UR STRIP-MCNC: NEGATIVE MG/DL
HBA1C MFR BLD: 6.3 % (ref 0–5.6)
HDLC SERPL-MCNC: 35 MG/DL
HGB UR QL STRIP: NEGATIVE
KETONES UR STRIP-MCNC: NEGATIVE MG/DL
LDLC SERPL CALC-MCNC: 43 MG/DL
LEUKOCYTE ESTERASE UR QL STRIP: NEGATIVE
MICROALBUMIN UR-MCNC: <12 MG/L
MICROALBUMIN/CREAT UR: NORMAL MG/G{CREAT}
MUCOUS THREADS #/AREA URNS LPF: PRESENT /LPF
NITRATE UR QL: NEGATIVE
NONHDLC SERPL-MCNC: 62 MG/DL
PH UR STRIP: 5.5 [PH] (ref 5–7)
POTASSIUM SERPL-SCNC: 3.8 MMOL/L (ref 3.4–5.3)
PROT SERPL-MCNC: 6.8 G/DL (ref 6.4–8.3)
RBC #/AREA URNS AUTO: ABNORMAL /HPF
SODIUM SERPL-SCNC: 138 MMOL/L (ref 135–145)
SP GR UR STRIP: 1.02 (ref 1–1.03)
TRIGL SERPL-MCNC: 94 MG/DL
UROBILINOGEN UR STRIP-ACNC: 0.2 E.U./DL
WBC #/AREA URNS AUTO: ABNORMAL /HPF

## 2024-02-28 PROCEDURE — 36415 COLL VENOUS BLD VENIPUNCTURE: CPT | Performed by: INTERNAL MEDICINE

## 2024-02-28 PROCEDURE — 81001 URINALYSIS AUTO W/SCOPE: CPT | Performed by: INTERNAL MEDICINE

## 2024-02-28 PROCEDURE — 82043 UR ALBUMIN QUANTITATIVE: CPT | Performed by: INTERNAL MEDICINE

## 2024-02-28 PROCEDURE — 80053 COMPREHEN METABOLIC PANEL: CPT | Performed by: INTERNAL MEDICINE

## 2024-02-28 PROCEDURE — 83036 HEMOGLOBIN GLYCOSYLATED A1C: CPT | Performed by: INTERNAL MEDICINE

## 2024-02-28 PROCEDURE — 82570 ASSAY OF URINE CREATININE: CPT | Performed by: INTERNAL MEDICINE

## 2024-02-28 PROCEDURE — 99214 OFFICE O/P EST MOD 30 MIN: CPT | Performed by: INTERNAL MEDICINE

## 2024-02-28 PROCEDURE — 80061 LIPID PANEL: CPT | Performed by: INTERNAL MEDICINE

## 2024-02-28 RX ORDER — RESPIRATORY SYNCYTIAL VIRUS VACCINE 120MCG/0.5
0.5 KIT INTRAMUSCULAR ONCE
Qty: 1 EACH | Refills: 0 | Status: CANCELLED | OUTPATIENT
Start: 2024-02-28 | End: 2024-02-28

## 2024-02-28 ASSESSMENT — PAIN SCALES - GENERAL: PAINLEVEL: NO PAIN (0)

## 2024-02-28 NOTE — PROGRESS NOTES
Assessment & Plan     (E11.65) Type 2 diabetes mellitus with hyperglycemia, unspecified whether long term insulin use (H)  (primary encounter diagnosis)  Comment: Diet -controlled.   met with diabetes ed.  Made diet changes, started using an exercise bike.    Plan: Albumin Random Urine Quantitative with Creat         Ratio, HEMOGLOBIN A1C, UA with Microscopic         reflex to Culture - lab collect          (E78.5) Hyperlipidemia LDL goal <100  Comment: well controlled  Plan: Lipid panel reflex to direct LDL Fasting,         Comprehensive metabolic panel (BMP + Alb, Alk         Phos, ALT, AST, Total. Bili, TP)          (K50.00) Crohn's disease of small intestine without complication (H)  Comment:   Plan: stable, continue colazol    (E66.01) BMI 36  Comment:   Plan: hx of obesity with comorbid diabetes, htn, hyperlipidemia      Ray Paredes is a 68 year old, presenting for the following health issues:  Follow Up        2/28/2024     7:19 AM   Additional Questions   Roomed by Priscilla Brothers   Accompanied by CARROL     History of Present Illness       Diabetes:   He presents for follow up of diabetes.  He is checking home blood glucose three times daily.   He checks blood glucose before and after meals and at bedtime.  Blood glucose is sometimes over 200 and never under 70. He is aware of hypoglycemia symptoms including other.   He is concerned about other.   He is having numbness in feet and weight loss.            He eats 2-3 servings of fruits and vegetables daily.He consumes 2 sweetened beverage(s) daily.He exercises with enough effort to increase his heart rate 20 to 29 minutes per day.  He exercises with enough effort to increase his heart rate 5 days per week.   He is taking medications regularly.     BP     110/60  2/28/2024    Lab Results   Component Value Date     02/07/2024     Lab Results   Component Value Date    A1C 6.7 10/02/2023     Lab Results   Component Value Date    LDL 59 10/02/2023    LDL  "41 04/05/2021     Patient Active Problem List   Diagnosis    Hereditary progressive muscular dystrophy (H)    Tinnitus    Health Care Home    Advanced directives, counseling/discussion    Family history of prostate cancer    Postoperative hypothyroidism    Gastroesophageal reflux disease without esophagitis    Crohn's disease of small intestine without complication (H)    Morbid obesity (H)    Type 2 diabetes mellitus with hyperglycemia (H)    Hyperlipidemia LDL goal <100    PVD (posterior vitreous detachment), right    Vitreomacular traction syndrome of left eye    Dry eye     Current Outpatient Medications   Medication Sig Dispense Refill    atorvastatin (LIPITOR) 40 MG tablet Take 1 tablet (40 mg) by mouth every evening 90 tablet 11    balsalazide (COLAZAL) 750 MG capsule Take 3 capsules by mouth every 12 hours      blood glucose (NO BRAND SPECIFIED) test strip Use to test blood sugar 1 times daily or as directed. To accompany: Blood Glucose Monitor Brands: per insurance. 100 strip 6    blood glucose monitoring (NO BRAND SPECIFIED) meter device kit Use to test blood sugar 1 times daily or as directed. Preferred blood glucose meter OR supplies to accompany: Blood Glucose Monitor Brands: per insurance. 1 kit 1    famotidine (PEPCID) 40 MG tablet Take 1 tablet (40 mg) by mouth 2 times daily as needed for heartburn 180 tablet 11    levothyroxine (SYNTHROID/LEVOTHROID) 150 MCG tablet Take 1 tablet (150 mcg) by mouth daily 90 tablet 11    lisinopril (ZESTRIL) 10 MG tablet Take 1 tablet (10 mg) by mouth daily 90 tablet 11    thin (NO BRAND SPECIFIED) lancets Use with lanceting device. To accompany: Blood Glucose Monitor Brands: per insurance. 100 each 6                          Objective    /60 (BP Location: Right arm, Patient Position: Sitting, Cuff Size: Adult Regular)   Pulse 59   Temp (!) 96  F (35.6  C) (Tympanic)   Resp 14   Ht 1.76 m (5' 9.29\")   Wt 111.9 kg (246 lb 11.2 oz)   SpO2 99%   BMI 36.13 " kg/m    Body mass index is 36.13 kg/m .  Physical Exam   GENERAL: alert and no distress  NECK: no adenopathy, no asymmetry, masses, or scars  RESP: lungs clear to auscultation - no rales, rhonchi or wheezes  CV: regular rate and rhythm, normal S1 S2, no S3 or S4, no murmur, click or rub, no peripheral edema  MS: no gross musculoskeletal defects noted, no edema  PSYCH: mentation appears normal, affect normal/bright            Signed Electronically by: Vinod Chris MD

## 2024-03-11 DIAGNOSIS — H43.811 PVD (POSTERIOR VITREOUS DETACHMENT), RIGHT: Primary | ICD-10-CM

## 2024-04-25 ENCOUNTER — OFFICE VISIT (OUTPATIENT)
Dept: OPHTHALMOLOGY | Facility: CLINIC | Age: 69
End: 2024-04-25
Payer: COMMERCIAL

## 2024-04-25 DIAGNOSIS — H43.822 VITREOMACULAR TRACTION SYNDROME OF LEFT EYE: ICD-10-CM

## 2024-04-25 DIAGNOSIS — H43.811 PVD (POSTERIOR VITREOUS DETACHMENT), RIGHT: Primary | ICD-10-CM

## 2024-04-25 PROCEDURE — 99207 FUNDUS PHOTOS OU (BOTH EYES): CPT | Mod: 26

## 2024-04-25 PROCEDURE — G0463 HOSPITAL OUTPT CLINIC VISIT: HCPCS

## 2024-04-25 PROCEDURE — 92134 CPTRZ OPH DX IMG PST SGM RTA: CPT

## 2024-04-25 PROCEDURE — 92250 FUNDUS PHOTOGRAPHY W/I&R: CPT

## 2024-04-25 PROCEDURE — 99213 OFFICE O/P EST LOW 20 MIN: CPT

## 2024-04-25 ASSESSMENT — CONF VISUAL FIELD
OS_INFERIOR_NASAL_RESTRICTION: 0
OS_SUPERIOR_NASAL_RESTRICTION: 0
OD_SUPERIOR_NASAL_RESTRICTION: 0
OD_NORMAL: 1
METHOD: COUNTING FINGERS
OS_SUPERIOR_TEMPORAL_RESTRICTION: 0
OS_INFERIOR_TEMPORAL_RESTRICTION: 0
OD_SUPERIOR_TEMPORAL_RESTRICTION: 0
OD_INFERIOR_NASAL_RESTRICTION: 0
OS_NORMAL: 1
OD_INFERIOR_TEMPORAL_RESTRICTION: 0

## 2024-04-25 ASSESSMENT — TONOMETRY
IOP_METHOD: TONOPEN
OS_IOP_MMHG: 15
OD_IOP_MMHG: 17

## 2024-04-25 ASSESSMENT — SLIT LAMP EXAM - LIDS
COMMENTS: MGD
COMMENTS: MGD

## 2024-04-25 ASSESSMENT — VISUAL ACUITY
OD_SC: 20/30
OS_SC: 20/20
METHOD: SNELLEN - LINEAR
OD_SC+: -1

## 2024-04-25 NOTE — PROGRESS NOTES
CC: Diabetic retinopathy     HPI: Blurred vision in the right, 3 weeks ago was first noticed, associated with flash of light when walking in a dark room. Stable now, comes and goes.    PMHx:   Pre-DM   DL  S/p thyroidectomy  Crohn's disease  Motor neuron disease?, Inability to flex fingers, weakness in arm and forearm     Imaging:  OCT: 04/25/2024  Right eye: Good foveal contour, no IRF/SRF  Left eye: Good foveal contour, no IRF/SRF. VMT     Retina Laser procedures:  none    Intravitreal injections:  none    Assessment/ Plan: 04/25/2024       # PVD right eye, acute symptomatic   - observe for now  - no holes or tears seen on exam today  - RD warning signs explained   - stable exam over the past 3 months, patient knows to come in if any change in symptoms    # Vitreomacular traction LEFT eye  - no IRF/SRF on OCT, stable  - Will observe for now    # Pre-DM type II, no diabetic retinopathy   - Last Hba1c: 6.7  - No DR changes on exam OU  - No DME on OCT  - recommend good blood glucose control   - yearly follow up    # Dry eye syndrome  - recommend artificial tears OU QID      Follow up in 1 year for DFE with OCT macula OU    Harley Lau MD     [facility]  [facility]     Attending Physician Attestation:  Complete documentation of historical and exam elements from today's encounter can be found in the full encounter summary report (not reduplicated in this progress note).  I personally obtained the chief complaint(s) and history of present illness.  I confirmed and edited as necessary the review of systems, past medical/surgical history, family history, social history, and examination findings as documented by others; and I examined the patient myself.  I personally reviewed the relevant tests, images, and reports as documented above.  I formulated and edited as necessary the assessment and plan and discussed the findings and management plan with the patient and family. Harley Kaiser  MD Sid

## 2024-04-25 NOTE — NURSING NOTE
Chief Complaints and History of Present Illnesses   Patient presents with    Posterior Vitreous Detachment Follow Up     Chief Complaint(s) and History of Present Illness(es)       Posterior Vitreous Detachment Follow Up              Laterality: right eye (States the blurry va comes and goes  )    Quality: No flashes      Associated symptoms: floaters and photophobia              Comments    AT  BS 99 this am  Lab Results       Component                Value               Date                       A1C                      6.3                 02/28/2024                 A1C                      6.7                 10/02/2023             Dacia Mei COT 7:22 AM April 25, 2024

## 2024-06-15 ENCOUNTER — HEALTH MAINTENANCE LETTER (OUTPATIENT)
Age: 69
End: 2024-06-15

## 2024-09-18 DIAGNOSIS — H43.822 VITREOMACULAR TRACTION SYNDROME OF LEFT EYE: Primary | ICD-10-CM

## 2024-10-02 ENCOUNTER — TRANSFERRED RECORDS (OUTPATIENT)
Dept: HEALTH INFORMATION MANAGEMENT | Facility: CLINIC | Age: 69
End: 2024-10-02
Payer: COMMERCIAL

## 2024-10-03 ENCOUNTER — OFFICE VISIT (OUTPATIENT)
Dept: OPHTHALMOLOGY | Facility: CLINIC | Age: 69
End: 2024-10-03
Payer: COMMERCIAL

## 2024-10-03 DIAGNOSIS — H35.371 EPIRETINAL MEMBRANE (ERM) OF RIGHT EYE: ICD-10-CM

## 2024-10-03 DIAGNOSIS — H04.123 DRY EYE SYNDROME OF BOTH EYES: Primary | ICD-10-CM

## 2024-10-03 DIAGNOSIS — H43.822 VITREOMACULAR TRACTION SYNDROME OF LEFT EYE: ICD-10-CM

## 2024-10-03 PROCEDURE — 99213 OFFICE O/P EST LOW 20 MIN: CPT

## 2024-10-03 PROCEDURE — 92134 CPTRZ OPH DX IMG PST SGM RTA: CPT

## 2024-10-03 PROCEDURE — G0463 HOSPITAL OUTPT CLINIC VISIT: HCPCS

## 2024-10-03 ASSESSMENT — SLIT LAMP EXAM - LIDS
COMMENTS: MGD
COMMENTS: MGD

## 2024-10-03 ASSESSMENT — VISUAL ACUITY
OD_SC: 20/40
METHOD: SNELLEN - LINEAR
OD_PH_SC: 20/30
OS_SC: 20/20
OS_SC+: -2
OD_SC+: +2
OD_PH_SC+: -1

## 2024-10-03 ASSESSMENT — TONOMETRY
IOP_METHOD: TONOPEN
OS_IOP_MMHG: 15
OD_IOP_MMHG: 14

## 2024-10-03 NOTE — PROGRESS NOTES
CC: Diabetic retinopathy     HPI: Blurred vision in the right, 3 weeks ago was first noticed, associated with flash of light when walking in a dark room. Stable now, comes and goes.    PMHx:   Pre-DM   DL  S/p thyroidectomy  Crohn's disease  Motor neuron disease?, Inability to flex fingers, weakness in arm and forearm     Imaging:  OCT: 10/03/2024  Right eye: Good foveal contour, no IRF/SRF, trace EPIRETINAL MEMBRANE forming   Left eye: Good foveal contour, no IRF/SRF. VMT     Retina Laser procedures:  none    Intravitreal injections:  none    Assessment/ Plan: 10/03/2024       # PVD right eye, acute symptomatic   - observe for now  - no holes or tears seen on exam today  - RD warning signs explained   - stable exam over the past 3 months, patient knows to come in if any change in symptoms    # epiretinal membrane OD  Trace, not visually significant   Will observe    # Dry eye syndrome both eyes   Recommend starting preservative free artificial tears both eyes     # Vitreomacular traction LEFT eye  - no IRF/SRF on OCT, stable  - Will observe for now    # Pre-DM type II, no diabetic retinopathy   - Last Hba1c: 6.7  - No DR changes on exam OU  - No DME on OCT  - recommend good blood glucose control   - yearly follow up    # Dry eye syndrome  - recommend artificial tears OU QID      Follow up with me in  2 months for VA and OCT macula + color photo for epiretinal membrane  Follow-up first available for refraction tech visit only     Harley Lau MD     [facility]  [facility]     Attending Physician Attestation:  Complete documentation of historical and exam elements from today's encounter can be found in the full encounter summary report (not reduplicated in this progress note).  I personally obtained the chief complaint(s) and history of present illness.  I confirmed and edited as necessary the review of systems, past medical/surgical history, family history, social history, and  examination findings as documented by others; and I examined the patient myself.  I personally reviewed the relevant tests, images, and reports as documented above.  I formulated and edited as necessary the assessment and plan and discussed the findings and management plan with the patient and family. Harley Lau MD

## 2024-10-03 NOTE — NURSING NOTE
"Chief Complaints and History of Present Illnesses   Patient presents with    Follow Up     PVD (posterior vitreous detachment), right  Diabetic retinopathy      Chief Complaint(s) and History of Present Illness(es)       Follow Up              Comments: PVD (posterior vitreous detachment), right  Diabetic retinopathy               Comments    States right eye is still blurry   States floaters have increased  \"Occasional flash of light right eye \"  States no eye pain or redness  LBS:  102  Last A1C: 6.3  Lab Results       Component                Value               Date                       A1C                      6.3                 02/28/2024                 A1C                      6.7                 10/02/2023              Mary Jane Lan COT 8:24 AM October 3, 2024                          "

## 2024-10-05 SDOH — HEALTH STABILITY: PHYSICAL HEALTH: ON AVERAGE, HOW MANY MINUTES DO YOU ENGAGE IN EXERCISE AT THIS LEVEL?: 20 MIN

## 2024-10-05 SDOH — HEALTH STABILITY: PHYSICAL HEALTH: ON AVERAGE, HOW MANY DAYS PER WEEK DO YOU ENGAGE IN MODERATE TO STRENUOUS EXERCISE (LIKE A BRISK WALK)?: 3 DAYS

## 2024-10-05 ASSESSMENT — SOCIAL DETERMINANTS OF HEALTH (SDOH): HOW OFTEN DO YOU GET TOGETHER WITH FRIENDS OR RELATIVES?: PATIENT DECLINED

## 2024-10-08 ENCOUNTER — OFFICE VISIT (OUTPATIENT)
Dept: PEDIATRICS | Facility: CLINIC | Age: 69
End: 2024-10-08
Attending: INTERNAL MEDICINE
Payer: COMMERCIAL

## 2024-10-08 VITALS
OXYGEN SATURATION: 95 % | SYSTOLIC BLOOD PRESSURE: 110 MMHG | BODY MASS INDEX: 36.14 KG/M2 | HEIGHT: 69 IN | RESPIRATION RATE: 16 BRPM | DIASTOLIC BLOOD PRESSURE: 66 MMHG | HEART RATE: 58 BPM | WEIGHT: 244 LBS | TEMPERATURE: 97.7 F

## 2024-10-08 DIAGNOSIS — I10 ESSENTIAL HYPERTENSION: ICD-10-CM

## 2024-10-08 DIAGNOSIS — E11.65 TYPE 2 DIABETES MELLITUS WITH HYPERGLYCEMIA, WITHOUT LONG-TERM CURRENT USE OF INSULIN (H): ICD-10-CM

## 2024-10-08 DIAGNOSIS — K21.9 GASTROESOPHAGEAL REFLUX DISEASE WITHOUT ESOPHAGITIS: ICD-10-CM

## 2024-10-08 DIAGNOSIS — Z80.42 FAMILY HISTORY OF PROSTATE CANCER: ICD-10-CM

## 2024-10-08 DIAGNOSIS — G71.09 HEREDITARY PROGRESSIVE MUSCULAR DYSTROPHY (H): ICD-10-CM

## 2024-10-08 DIAGNOSIS — Z12.5 SCREENING FOR PROSTATE CANCER: ICD-10-CM

## 2024-10-08 DIAGNOSIS — K50.00 CROHN'S DISEASE OF SMALL INTESTINE WITHOUT COMPLICATION (H): ICD-10-CM

## 2024-10-08 DIAGNOSIS — E66.01 MORBID OBESITY (H): ICD-10-CM

## 2024-10-08 DIAGNOSIS — Z00.00 ENCOUNTER FOR MEDICARE ANNUAL WELLNESS EXAM: Primary | ICD-10-CM

## 2024-10-08 DIAGNOSIS — E78.5 HYPERLIPIDEMIA LDL GOAL <100: ICD-10-CM

## 2024-10-08 DIAGNOSIS — E89.0 POSTOPERATIVE HYPOTHYROIDISM: ICD-10-CM

## 2024-10-08 LAB
EST. AVERAGE GLUCOSE BLD GHB EST-MCNC: 137 MG/DL
HBA1C MFR BLD: 6.4 % (ref 0–5.6)
PSA SERPL DL<=0.01 NG/ML-MCNC: 0.7 NG/ML (ref 0–4.5)
T4 FREE SERPL-MCNC: 1.48 NG/DL (ref 0.9–1.7)
TSH SERPL DL<=0.005 MIU/L-ACNC: 5.28 UIU/ML (ref 0.3–4.2)

## 2024-10-08 PROCEDURE — G0103 PSA SCREENING: HCPCS | Performed by: INTERNAL MEDICINE

## 2024-10-08 PROCEDURE — 83036 HEMOGLOBIN GLYCOSYLATED A1C: CPT | Performed by: INTERNAL MEDICINE

## 2024-10-08 PROCEDURE — 99207 PR FOOT EXAM NO CHARGE: CPT | Performed by: INTERNAL MEDICINE

## 2024-10-08 PROCEDURE — 91320 SARSCV2 VAC 30MCG TRS-SUC IM: CPT | Performed by: INTERNAL MEDICINE

## 2024-10-08 PROCEDURE — 90480 ADMN SARSCOV2 VAC 1/ONLY CMP: CPT | Performed by: INTERNAL MEDICINE

## 2024-10-08 PROCEDURE — G0008 ADMIN INFLUENZA VIRUS VAC: HCPCS | Performed by: INTERNAL MEDICINE

## 2024-10-08 PROCEDURE — 84443 ASSAY THYROID STIM HORMONE: CPT | Performed by: INTERNAL MEDICINE

## 2024-10-08 PROCEDURE — 90662 IIV NO PRSV INCREASED AG IM: CPT | Performed by: INTERNAL MEDICINE

## 2024-10-08 PROCEDURE — 84439 ASSAY OF FREE THYROXINE: CPT | Performed by: INTERNAL MEDICINE

## 2024-10-08 PROCEDURE — 36415 COLL VENOUS BLD VENIPUNCTURE: CPT | Performed by: INTERNAL MEDICINE

## 2024-10-08 PROCEDURE — G0439 PPPS, SUBSEQ VISIT: HCPCS | Performed by: INTERNAL MEDICINE

## 2024-10-08 PROCEDURE — 99214 OFFICE O/P EST MOD 30 MIN: CPT | Mod: 25 | Performed by: INTERNAL MEDICINE

## 2024-10-08 RX ORDER — ATORVASTATIN CALCIUM 40 MG/1
40 TABLET, FILM COATED ORAL EVERY EVENING
Qty: 90 TABLET | Refills: 11 | Status: CANCELLED | OUTPATIENT
Start: 2024-10-08

## 2024-10-08 RX ORDER — FAMOTIDINE 40 MG/1
40 TABLET, FILM COATED ORAL 2 TIMES DAILY PRN
Qty: 180 TABLET | Refills: 11 | Status: SHIPPED | OUTPATIENT
Start: 2024-10-08

## 2024-10-08 RX ORDER — BALSALAZIDE DISODIUM 750 MG/1
2250 CAPSULE ORAL EVERY 12 HOURS
Status: CANCELLED | OUTPATIENT
Start: 2024-10-08

## 2024-10-08 RX ORDER — BALSALAZIDE DISODIUM 750 MG/1
3 CAPSULE ORAL EVERY 12 HOURS
COMMUNITY
Start: 2023-07-10 | End: 2025-06-02

## 2024-10-08 RX ORDER — LISINOPRIL 10 MG/1
10 TABLET ORAL DAILY
Qty: 90 TABLET | Refills: 11 | Status: SHIPPED | OUTPATIENT
Start: 2024-10-08

## 2024-10-08 ASSESSMENT — PAIN SCALES - GENERAL: PAINLEVEL: NO PAIN (0)

## 2024-10-08 NOTE — PROGRESS NOTES
"Preventive Care Visit  [facility] CHERYL Chris MD, Internal Medicine - Pediatrics  Oct 8, 2024      Assessment & Plan     (Z00.00) Encounter for Medicare annual wellness exam  (primary encounter diagnosis)    (G71.09) Hereditary progressive muscular dystrophy (H)  Comment:   Plan: chronic b/l upper extremity weakness. Stable.    (E11.65) Type 2 diabetes mellitus with hyperglycemia, without long-term current use of insulin (H)  Comment: diet controlled.  Due for A1c  Plan: HEMOGLOBIN A1C, FOOT EXAM          (E78.5) Hyperlipidemia LDL goal <100  Comment:   Plan: well controlled.  Notes b/l foot cramping for several months/has questions about neuropathy/ denies numbness or paresthesias.   Suspect statin side effect/stop lipitor for 2 weeks and follow-up by marisa    (I10) Essential hypertension  Comment:   Plan: lisinopril (ZESTRIL) 10 MG tablet        Adequate control.  Continue lisinopril    (K50.00) Crohn's disease of small intestine without complication (H)  Comment:   Plan: managed by GI, stable.  Continue colazal    (E89.0) Postoperative hypothyroidism  Comment:   Plan: TSH WITH FREE T4 REFLEX          (K21.9) Gastroesophageal reflux disease without esophagitis  Comment:   Plan: famotidine (PEPCID) 40 MG tablet          (E66.01) BMI 36  Comment:   Plan: obesity with co-morbid DM, HTN, hyperlipidemia            BMI  Estimated body mass index is 36.03 kg/m  as calculated from the following:    Height as of this encounter: 1.753 m (5' 9\").    Weight as of this encounter: 110.7 kg (244 lb).       Counseling  Appropriate preventive services were addressed with this patient via screening, questionnaire, or discussion as appropriate for fall prevention, nutrition, physical activity, Tobacco-use cessation, social engagement, weight loss and cognition.  Checklist reviewing preventive services available has been given to the patient.  Reviewed patient's diet, addressing concerns and/or questions. "   He is at risk for lack of exercise and has been provided with information to increase physical activity for the benefit of his well-being.   He is at risk for psychosocial distress and has been provided with information to reduce risk.   The patient was provided with written information regarding signs of hearing loss.           Ray Paredes is a 69 year old, presenting for the following:  Wellness Visit        10/8/2024     7:50 AM   Additional Questions   Roomed by Elli SORTO CMA   Accompanied by Self         10/8/2024     7:50 AM   Patient Reported Additional Medications   Patient reports taking the following new medications n/a         Health Care Directive  Patient does not have a Health Care Directive or Living Will: Discussed advance care planning with patient; information given to patient to review.    HPI              10/5/2024   General Health   How would you rate your overall physical health? (!) FAIR   Feel stress (tense, anxious, or unable to sleep) Only a little           10/5/2024   Nutrition   Diet: Diabetic            10/5/2024   Exercise   Days per week of moderate/strenous exercise 3 days   Average minutes spent exercising at this level 20 min            10/5/2024   Social Factors   Frequency of gathering with friends or relatives Patient declined   Worry food won't last until get money to buy more No   Food not last or not have enough money for food? No   Do you have housing? (Housing is defined as stable permanent housing and does not include staying ouside in a car, in a tent, in an abandoned building, in an overnight shelter, or couch-surfing.) Yes   Are you worried about losing your housing? No   Lack of transportation? No   Unable to get utilities (heat,electricity)? No            10/8/2024   Fall Risk   Gait Speed Test (Document in seconds) 5   Gait Speed Test Interpretation Less than or equal to 5.00 seconds - PASS             10/5/2024   Activities of Daily Living- Home Safety    Needs help with the following daily activites None of the above   Safety concerns in the home None of the above            10/5/2024   Dental   Dentist two times every year? Yes            10/5/2024   Hearing Screening   Hearing concerns? (!) I NEED TO ASK PEOPLE TO SPEAK UP OR REPEAT THEMSELVES.            10/5/2024   Driving Risk Screening   Patient/family members have concerns about driving No            10/5/2024   General Alertness/Fatigue Screening   Have you been more tired than usual lately? No            10/5/2024   Urinary Incontinence Screening   Bothered by leaking urine in past 6 months No            10/5/2024   TB Screening   Were you born outside of the [place]? No            Today's PHQ-2 Score:       10/8/2024     7:41 AM   PHQ-2 (  [employer])   Q1: Little interest or pleasure in doing things 1   Q2: Feeling down, depressed or hopeless 1   PHQ-2 Score 2   Q1: Little interest or pleasure in doing things Several days   Q2: Feeling down, depressed or hopeless Several days   PHQ-2 Score 2           10/5/2024   Substance Use   Alcohol more than 3/day or more than 7/wk No   Do you have a current opioid prescription? No   How severe/bad is pain from 1 to 10? 0/10 (No Pain)   Do you use any other substances recreationally? No        Social History     Tobacco Use    Smoking status: Former     Current packs/day: 0.00     Types: Cigarettes     Quit date: 1980     Years since quittin.6     Passive exposure: Never    Smokeless tobacco: Never   Vaping Use    Vaping status: Never Used   Substance Use Topics    Alcohol use: Yes     Comment: rare    Drug use: No       Last PSA:   PSA   Date Value Ref Range Status   2020 0.69 0 - 4 ug/L Final     Comment:     Assay Method:  Chemiluminescence using Siemens Vista analyzer     Prostate Specific Antigen Screen   Date Value Ref Range Status   10/02/2023 0.51 0.00 - 4.50 ng/mL Final   2022 0.60 0.00 - 4.00 ug/L Final     ASCVD Risk   The ASCVD  Risk score (Aida MCFARLAND, et al., 2019) failed to calculate for the following reasons:    The valid total cholesterol range is 130 to 320 mg/dL            Reviewed and updated as needed this visit by Provider                    Patient Active Problem List   Diagnosis    Hereditary progressive muscular dystrophy (H)    Tinnitus    Family history of prostate cancer    Postoperative hypothyroidism    Gastroesophageal reflux disease without esophagitis    Crohn's disease of small intestine without complication (H)    Morbid obesity (H)    Type 2 diabetes mellitus with hyperglycemia (H)    Hyperlipidemia LDL goal <100    PVD (posterior vitreous detachment), right    Vitreomacular traction syndrome of left eye    Dry eye     Past Surgical History:   Procedure Laterality Date    ABDOMEN SURGERY      APPENDECTOMY      APPENDECTOMY      BIOPSY  2021    CHOLECYSTECTOMY      CHOLECYSTECTOMY      COLONOSCOPY  2013    Colonoscopy Dr. Pruitt [facility]    COLONOSCOPY  2013    Procedure: COLONOSCOPY;  Colonoscopy ;  Surgeon: Vinod Pruitt MD;  Location:  GI    ENT SURGERY      T&A as a child    HC THYROID LOBECTOMY,UNIL  1990    HC THYROID LOBECTOMY,[facility]  2008    resection goiter    THYROIDECTOMY      TONSILLECTOMY         Social History     Tobacco Use    Smoking status: Former     Current packs/day: 0.00     Types: Cigarettes     Quit date: 1980     Years since quittin.6     Passive exposure: Never    Smokeless tobacco: Never   Substance Use Topics    Alcohol use: Yes     Comment: rare     Family History   Problem Relation Age of Onset    Heart Disease Mother     Cancer - colorectal Mother     Hypertension Mother     Alzheimer Disease Father     Neurologic Disorder Father     Obesity Brother     Gastrointestinal Disease Brother         colon polyps    Diabetes Brother     Diabetes Sister     Other Cancer Sister         melanoma    Anxiety Disorder Daughter     Thyroid Disease Sister      Glaucoma No family hx of     Macular Degeneration No family hx of          Current Outpatient Medications   Medication Sig Dispense Refill    atorvastatin (LIPITOR) 40 MG tablet Take 1 tablet (40 mg) by mouth every evening 90 tablet 11    balsalazide (COLAZAL) 750 MG capsule Take 3 capsules by mouth every 12 hours.      blood glucose (NO BRAND SPECIFIED) test strip Use to test blood sugar 1 times daily or as directed. To accompany: Blood Glucose Monitor Brands: per insurance. 100 strip 6    famotidine (PEPCID) 40 MG tablet Take 1 tablet (40 mg) by mouth 2 times daily as needed for heartburn. 180 tablet 11    levothyroxine (SYNTHROID/LEVOTHROID) 150 MCG tablet Take 1 tablet (150 mcg) by mouth daily 90 tablet 11    lisinopril (ZESTRIL) 10 MG tablet Take 1 tablet (10 mg) by mouth daily. 90 tablet 11    thin (NO BRAND SPECIFIED) lancets Use with lanceting device. To accompany: Blood Glucose Monitor Brands: per insurance. 100 each 6    blood glucose monitoring (NO BRAND SPECIFIED) meter device kit Use to test blood sugar 1 times daily or as directed. Preferred blood glucose meter OR supplies to accompany: Blood Glucose Monitor Brands: per insurance. 1 kit 1     Current providers sharing in care for this patient include:  Patient Care Team:  Vinod Chris MD as PCP - General  Vinod Chris MD as Assigned PCP  Haley Reed as Diabetes Educator (Dietitian, Registered)  Harley Ventura MD as Assigned Surgical Provider    The following health maintenance items are reviewed in Epic and correct as of today:  Health Maintenance   Topic Date Due    DIABETIC FOOT EXAM  Never done    RSV VACCINE (1 - Risk 60-74 years 1-dose series) Never done    ANNUAL REVIEW OF HM ORDERS  09/29/2022    A1C  05/28/2024    COVID-19 Vaccine (7 - 2024-25 season) 09/01/2024    MEDICARE ANNUAL WELLNESS VISIT  10/02/2024    TSH W/FREE T4 REFLEX  10/02/2024    BMP  02/28/2025    LIPID  02/28/2025    MICROALBUMIN  02/28/2025     "COLORECTAL CANCER SCREENING  08/15/2025    EYE EXAM  10/03/2025    FALL RISK ASSESSMENT  10/08/2025    DTAP/TDAP/TD IMMUNIZATION (3 - Td or Tdap) 06/13/2027    ADVANCE CARE PLANNING  10/02/2028    HEPATITIS C SCREENING  Completed    PHQ-2 (once per calendar year)  Completed    Pneumococcal Vaccine: 65+ Years  Completed    ZOSTER IMMUNIZATION  Completed    AORTIC ANEURYSM SCREENING (SYSTEM ASSIGNED)  Completed    HPV IMMUNIZATION  Aged Out    MENINGITIS IMMUNIZATION  Aged Out    RSV MONOCLONAL ANTIBODY  Aged Out    INFLUENZA VACCINE  Discontinued    LUNG CANCER SCREENING  Discontinued         Review of Systems  Constitutional, neuro, ENT, endocrine, pulmonary, cardiac, gastrointestinal, genitourinary, musculoskeletal, integument and psychiatric systems are negative, except as otherwise noted.     Objective    Exam  /66   Pulse 58   Temp 97.7  F (36.5  C) (Temporal)   Resp 16   Ht 1.753 m (5' 9\")   Wt 110.7 kg (244 lb)   SpO2 95%   BMI 36.03 kg/m     Estimated body mass index is 36.03 kg/m  as calculated from the following:    Height as of this encounter: 1.753 m (5' 9\").    Weight as of this encounter: 110.7 kg (244 lb).    Physical Exam  GENERAL: alert and no distress  EYES: Eyes grossly normal to inspection, PERRL and conjunctivae and sclerae normal  HENT: ear canals and TM's normal, nose and mouth without ulcers or lesions  NECK: no adenopathy, no asymmetry, masses, or scars  RESP: lungs clear to auscultation - no rales, rhonchi or wheezes  CV: regular rate and rhythm, normal S1 S2, no S3 or S4, no murmur, click or rub, no peripheral edema  ABDOMEN: soft, nontender, no hepatosplenomegaly, no masses and bowel sounds normal  MS: b/l upper extrem atrophy  SKIN: no suspicious lesions or rashes  NEURO: mentation intact and speech normal  PSYCH: mentation appears normal, affect normal/bright  Diabetic foot exam: without skin lesions,normal sensation        10/8/2024   Mini Cog   Mini-Cog Not Completed " (choose reason) Patient declines          Patient declines, there are NO concerns for cognitive deficits.           Signed Electronically by: Vinod Chris MD

## 2024-10-08 NOTE — PATIENT INSTRUCTIONS
Patient Education       Stop atorvastatin for 2 weeks and send mychart note regarding your symptoms    Preventive Care Advice   This is general advice given by our system to help you stay healthy. However, your care team may have specific advice just for you. Please talk to your care team about your preventive care needs.  Nutrition  Eat 5 or more servings of fruits and vegetables each day.  Try wheat bread, brown rice and whole grain pasta (instead of white bread, rice, and pasta).  Get enough calcium and vitamin D. Check the label on foods and aim for 100% of the RDA (recommended daily allowance).  Lifestyle  Exercise at least 150 minutes each week  (30 minutes a day, 5 days a week).  Do muscle strengthening activities 2 days a week. These help control your weight and prevent disease.  No smoking.  Wear sunscreen to prevent skin cancer.  Have a dental exam and cleaning every 6 months.  Yearly exams  See your health care team every year to talk about:  Any changes in your health.  Any medicines your care team has prescribed.  Preventive care, family planning, and ways to prevent chronic diseases.  Shots (vaccines)   HPV shots (up to age 26), if you've never had them before.  Hepatitis B shots (up to age 59), if you've never had them before.  COVID-19 shot: Get this shot when it's due.  Flu shot: Get a flu shot every year.  Tetanus shot: Get a tetanus shot every 10 years.  Pneumococcal, hepatitis A, and RSV shots: Ask your care team if you need these based on your risk.  Shingles shot (for age 50 and up)  General health tests  Diabetes screening:  Starting at age 35, Get screened for diabetes at least every 3 years.  If you are younger than age 35, ask your care team if you should be screened for diabetes.  Cholesterol test: At age 39, start having a cholesterol test every 5 years, or more often if advised.  Bone density scan (DEXA): At age 50, ask your care team if you should have this scan for osteoporosis  (brittle bones).  Hepatitis C: Get tested at least once in your life.  STIs (sexually transmitted infections)  Before age 24: Ask your care team if you should be screened for STIs.  After age 24: Get screened for STIs if you're at risk. You are at risk for STIs (including HIV) if:  You are sexually active with more than one person.  You don't use condoms every time.  You or a partner was diagnosed with a sexually transmitted infection.  If you are at risk for HIV, ask about PrEP medicine to prevent HIV.  Get tested for HIV at least once in your life, whether you are at risk for HIV or not.  Cancer screening tests  Cervical cancer screening: If you have a cervix, begin getting regular cervical cancer screening tests starting at age 21.  Breast cancer scan (mammogram): If you've ever had breasts, begin having regular mammograms starting at age 40. This is a scan to check for breast cancer.  Colon cancer screening: It is important to start screening for colon cancer at age 45.  Have a colonoscopy test every 10 years (or more often if you're at risk) Or, ask your provider about stool tests like a FIT test every year or Cologuard test every 3 years.  To learn more about your testing options, visit:   .  For help making a decision, visit:   https://bit.ly/ev32384.  Prostate cancer screening test: If you have a prostate, ask your care team if a prostate cancer screening test (PSA) at age 55 is right for you.  Lung cancer screening: If you are a current or former smoker ages 50 to 80, ask your care team if ongoing lung cancer screenings are right for you.  For informational purposes only. Not to replace the advice of your health care provider. Copyright   2023 [employer]. All rights reserved. Clinically reviewed by the [facility]. [employer] 770767 - REV 01/24.  Eating Healthy Foods: Care Instructions  With every meal, you can make healthy food choices. Try to eat a variety of  "fruits, vegetables, whole grains, lean proteins, and low-fat dairy products. This can help you get the right balance of nutrients, including vitamins and minerals. Small changes add up over time. You can start by adding one healthy food to your meals each day.    Try to make half your plate fruits and vegetables, one-fourth whole grains, and one-fourth lean proteins. Try including dairy with your meals.   Eat more fruits and vegetables. Try to have them with most meals and snacks.   Foods for healthy eating        Fruits   These can be fresh, frozen, canned, or dried.  Try to choose whole fruit rather than fruit juice.  Eat a variety of colors.        Vegetables   These can be fresh, frozen, canned, or dried.  Beans, peas, and lentils count too.        Whole grains   Choose whole-grain breads, cereals, and noodles.  Try brown rice.        Lean proteins   These can include lean meat, poultry, fish, and eggs.  You can also have tofu, beans, peas, lentils, nuts, and seeds.        Dairy   Try milk, yogurt, and cheese.  Choose low-fat or fat-free when you can.  If you need to, use lactose-free milk or fortified plant-based milk products, such as soy milk.        Water   Drink water when you're thirsty.  Limit sugar-sweetened drinks, including soda, fruit drinks, and sports drinks.  Where can you learn more?  Go to https://www.trueEX.net/patiented  Enter T756 in the search box to learn more about \"Eating Healthy Foods: Care Instructions.\"  Current as of: September 20, 2023  Content Version: 14.2 2024 [facility] [employer].   Care instructions adapted under license by your healthcare professional. If you have questions about a medical condition or this instruction, always ask your healthcare professional. [employer] disclaims any warranty or liability for your use of this information.    Preventing Falls: Care Instructions  Injuries and health problems such as trouble walking or poor eyesight can " increase your risk of falling. So can some medicines. But there are things you can do to help prevent falls. You can exercise to get stronger. You can also arrange your home to make it safer.    Talk to your doctor about the medicines you take. Ask if any of them increase the risk of falls and whether they can be changed or stopped.   Try to exercise regularly. It can help improve your strength and balance. This can help lower your risk of falling.         Practice fall safety and prevention.   Wear low-heeled shoes that fit well and give your feet good support. Talk to your doctor if you have foot problems that make this hard.  Carry a cellphone or wear a medical alert device that you can use to call for help.  Use stepladders instead of chairs to reach high objects. Don't climb if you're at risk for falls. Ask for help, if needed.  Wear the correct eyeglasses, if you need them.        Make your home safer.   Remove rugs, cords, clutter, and furniture from walkways.  Keep your house well lit. Use night-lights in hallways and bathrooms.  Install and use sturdy handrails on stairways.  Wear nonskid footwear, even inside. Don't walk barefoot or in socks without shoes.        Be safe outside.   Use handrails, curb cuts, and ramps whenever possible.  Keep your hands free by using a shoulder bag or backpack.  Try to walk in well-lit areas. Watch out for uneven ground, changes in pavement, and debris.  Be careful in the winter. Walk on the grass or gravel when sidewalks are slippery. Use de-icer on steps and walkways. Add non-slip devices to shoes.    Put grab bars and nonskid mats in your shower or tub and near the toilet. Try to use a shower chair or bath bench when bathing.   Get into a tub or shower by putting in your weaker leg first. Get out with your strong side first. Have a phone or medical alert device in the bathroom with you.   Where can you learn more?  Go to https://www.healthwise.net/patiented  Enter M664  "in the search box to learn more about \"Preventing Falls: Care Instructions.\"  Current as of: July 17, 2023  Content Version: 14.2 2024 [employer].   Care instructions adapted under license by your healthcare professional. If you have questions about a medical condition or this instruction, always ask your healthcare professional. Health[employer] disclaims any warranty or liability for your use of this information.    Hearing Loss: Care Instructions  Overview     Hearing loss is a sudden or slow decrease in how well you hear. It can range from slight to profound. Permanent hearing loss can occur with aging. It also can happen when you are exposed long-term to loud noise. Examples include listening to loud music, riding motorcycles, or being around other loud machines.  Hearing loss can affect your work and home life. It can make you feel lonely or depressed. You may feel that you have lost your independence. But hearing aids and other devices can help you hear better and feel connected to others.  Follow-up care is a key part of your treatment and safety. Be sure to make and go to all appointments, and call your doctor if you are having problems. It's also a good idea to know your test results and keep a list of the medicines you take.  How can you care for yourself at home?  Avoid loud noises whenever possible. This helps keep your hearing from getting worse.  Always wear hearing protection around loud noises.  Wear a hearing aid as directed.  A professional can help you pick a hearing aid that will work best for you.  You can also get hearing aids over the counter for mild to moderate hearing loss.  Have hearing tests as your doctor suggests. They can show whether your hearing has changed. Your hearing aid may need to be adjusted.  Use other devices as needed. These may include:  Telephone amplifiers and hearing aids that can connect to a television, stereo, radio, or microphone.  Devices " "that use lights or vibrations. These alert you to the doorbell, a ringing telephone, or a baby monitor.  Television closed-captioning. This shows the words at the bottom of the screen. Most new TVs can do this.  TTY (text telephone). This lets you type messages back and forth on the telephone instead of talking or listening. These devices are also called TDD. When messages are typed on the keyboard, they are sent over the phone line to a receiving TTY. The message is shown on a monitor.  Use text messaging, social media, and email if it is hard for you to communicate by telephone.  Try to learn a listening technique called speechreading. It is not lipreading. You pay attention to people's gestures, expressions, posture, and tone of voice. These clues can help you understand what a person is saying. Face the person you are talking to, and have them face you. Make sure the lighting is good. You need to see the other person's face clearly.  Think about counseling if you need help to adjust to your hearing loss.  When should you call for help?  Watch closely for changes in your health, and be sure to contact your doctor if:    You think your hearing is getting worse.     You have new symptoms, such as dizziness or nausea.   Where can you learn more?  Go to https://www.[employer].net/patiented  Enter R798 in the search box to learn more about \"Hearing Loss: Care Instructions.\"  Current as of: September 27, 2023  Content Version: 14.2 2024 [facility] [employer].   Care instructions adapted under license by your healthcare professional. If you have questions about a medical condition or this instruction, always ask your healthcare professional. Healthwise[employer] Incorporated disclaims any warranty or liability for your use of this information.       "

## 2024-10-24 ENCOUNTER — ALLIED HEALTH/NURSE VISIT (OUTPATIENT)
Dept: OPHTHALMOLOGY | Facility: CLINIC | Age: 69
End: 2024-10-24
Payer: COMMERCIAL

## 2024-10-24 DIAGNOSIS — H04.129 DRY EYE: Primary | ICD-10-CM

## 2024-10-24 PROCEDURE — 99207 PR NO CHARGE NURSE ONLY: CPT

## 2024-10-24 PROCEDURE — 92015 DETERMINE REFRACTIVE STATE: CPT

## 2024-10-24 ASSESSMENT — VISUAL ACUITY
METHOD_MR_RETINOSCOPY: 1
OS_SC: 20/30
METHOD: SNELLEN - LINEAR
OS_SC+: -1
OD_SC: 20/30

## 2024-10-24 ASSESSMENT — REFRACTION_MANIFEST
OD_CYLINDER: +0.75
OS_AXIS: 135
OS_CYLINDER: +1.75
OD_SPHERE: +0.25
OD_ADD: +2.50
OS_SPHERE: -0.50
OS_ADD: +2.50
OD_AXIS: 035

## 2024-10-24 ASSESSMENT — TONOMETRY
OS_IOP_MMHG: 15
IOP_METHOD: TONOPEN
OD_IOP_MMHG: 16

## 2024-11-27 DIAGNOSIS — H35.371 EPIRETINAL MEMBRANE (ERM) OF RIGHT EYE: Primary | ICD-10-CM

## 2024-12-07 DIAGNOSIS — R07.89 OTHER CHEST PAIN: ICD-10-CM

## 2024-12-09 RX ORDER — ATORVASTATIN CALCIUM 40 MG/1
40 TABLET, FILM COATED ORAL EVERY EVENING
Qty: 90 TABLET | Refills: 1 | Status: SHIPPED | OUTPATIENT
Start: 2024-12-09

## 2024-12-21 DIAGNOSIS — E89.0 POSTOPERATIVE HYPOTHYROIDISM: ICD-10-CM

## 2024-12-23 RX ORDER — LEVOTHYROXINE SODIUM 150 UG/1
150 TABLET ORAL DAILY
Qty: 90 TABLET | Refills: 11 | Status: SHIPPED | OUTPATIENT
Start: 2024-12-23

## 2024-12-31 DIAGNOSIS — H35.371 EPIRETINAL MEMBRANE (ERM) OF RIGHT EYE: Primary | ICD-10-CM

## 2025-01-09 ENCOUNTER — OFFICE VISIT (OUTPATIENT)
Dept: OPHTHALMOLOGY | Facility: CLINIC | Age: 70
End: 2025-01-09
Payer: COMMERCIAL

## 2025-01-09 DIAGNOSIS — H35.371 EPIRETINAL MEMBRANE (ERM) OF RIGHT EYE: ICD-10-CM

## 2025-01-09 DIAGNOSIS — H25.013 CORTICAL SENILE CATARACT OF BOTH EYES: Primary | ICD-10-CM

## 2025-01-09 PROCEDURE — 92134 CPTRZ OPH DX IMG PST SGM RTA: CPT

## 2025-01-09 ASSESSMENT — REFRACTION_WEARINGRX
OD_AXIS: 035
OD_SPHERE: +0.25
OD_CYLINDER: +0.75
OS_ADD: +2.50
OS_SPHERE: -0.50
OS_AXIS: 135
OD_ADD: +2.50
OS_CYLINDER: +1.75

## 2025-01-09 ASSESSMENT — SLIT LAMP EXAM - LIDS
COMMENTS: MGD
COMMENTS: MGD

## 2025-01-09 ASSESSMENT — VISUAL ACUITY
CORRECTION_TYPE: GLASSES
OD_CC: 20/25
OD_CC+: -1
METHOD: SNELLEN - LINEAR
OS_CC: 20/20
OS_CC+: -2

## 2025-01-09 ASSESSMENT — TONOMETRY
OD_IOP_MMHG: 13
IOP_METHOD: TONOPEN
OS_IOP_MMHG: 14

## 2025-01-09 NOTE — NURSING NOTE
"Chief Complaints and History of Present Illnesses   Patient presents with    Follow Up     epiretinal membrane right eye       Chief Complaint(s) and History of Present Illness(es)       Follow Up              Comments: epiretinal membrane right eye                Comments    Pt states no change in VA since last visit  Dryness better, using AT's 3-4X/day   Floaters same as last visit   Flashes same as before , \"Occasional \"  No eye pain or redness  LBS:0131  Last A1c: 6.3  Lab Results       Component                Value               Date                       A1C                      6.4                 10/08/2024                 A1C                      6.3                 02/28/2024                 A1C                      6.7                 10/02/2023              Mary Jane Lan COT 7:17 AM January 9, 2025                            "

## 2025-01-09 NOTE — PROGRESS NOTES
CC: Diabetic retinopathy     HPI: Blurred vision in the right, 3 weeks ago was first noticed, associated with flash of light when walking in a dark room. Stable now, comes and goes.    PMHx:   Pre-DM   DL  S/p thyroidectomy  Crohn's disease  Motor neuron disease?, Inability to flex fingers, weakness in arm and forearm     Imaging:  OCT: 01/09/2025  Right eye: Good foveal contour, no IRF/SRF, trace EPIRETINAL MEMBRANE forming   Left eye: Good foveal contour, no IRF/SRF. VMT     Retina Laser procedures:  none    Intravitreal injections:  none    Assessment/ Plan: 01/09/2025       # PVD right eye, acute symptomatic   - observe for now  - no holes or tears seen on exam today  - RD warning signs explained   - stable exam over the past 3 months, patient knows to come in if any change in symptoms  - follow-up in 8-9 months DFE    # epiretinal membrane OD  Trace, not visually significant   Slight bunching of the retina nasally however asymptomatic  Will continue to observe    # Dry eye syndrome both eyes   Recommend starting preservative free artificial tears both eyes     # Vitreomacular traction LEFT eye  - no IRF/SRF on OCT, stable  - Will observe for now    # Pre-DM type II, no diabetic retinopathy   - Last Hba1c: 6.7  - No DR changes on exam OU  - No DME on OCT  - recommend good blood glucose control   - yearly follow up    # Dry eye syndrome  - recommend artificial tears OU QID    # cataract both eyes   Is causing some glare at night but still tolerated, will observe for now    Follow up with me in  2 months for VA and OCT macula + color photo for epiretinal membrane  Follow-up first available for refraction tech visit only     Harley Lau MD     [facility]  [facility]     Attending Physician Attestation:  Complete documentation of historical and exam elements from today's encounter can be found in the full encounter summary report (not reduplicated in this progress note).  I  personally obtained the chief complaint(s) and history of present illness.  I confirmed and edited as necessary the review of systems, past medical/surgical history, family history, social history, and examination findings as documented by others; and I examined the patient myself.  I personally reviewed the relevant tests, images, and reports as documented above.  I formulated and edited as necessary the assessment and plan and discussed the findings and management plan with the patient and family. Harley Lau MD

## 2025-01-23 ENCOUNTER — RADIOLOGY INJECTION OFFICE VISIT (OUTPATIENT)
Dept: PHYSICAL MEDICINE AND REHAB | Facility: CLINIC | Age: 70
End: 2025-01-23
Attending: PAIN MEDICINE
Payer: COMMERCIAL

## 2025-01-23 VITALS
HEIGHT: 70 IN | WEIGHT: 250 LBS | SYSTOLIC BLOOD PRESSURE: 108 MMHG | HEART RATE: 51 BPM | TEMPERATURE: 97.9 F | OXYGEN SATURATION: 98 % | DIASTOLIC BLOOD PRESSURE: 68 MMHG | BODY MASS INDEX: 35.79 KG/M2

## 2025-01-23 DIAGNOSIS — E11.9 DIABETES MELLITUS (H): Primary | ICD-10-CM

## 2025-01-23 DIAGNOSIS — M17.12 PRIMARY OSTEOARTHRITIS OF LEFT KNEE: ICD-10-CM

## 2025-01-23 LAB — GLUCOSE SERPL-MCNC: 97 MG/DL (ref 70–99)

## 2025-01-23 RX ORDER — ROPIVACAINE HYDROCHLORIDE 5 MG/ML
INJECTION, SOLUTION EPIDURAL; INFILTRATION; PERINEURAL
Status: COMPLETED | OUTPATIENT
Start: 2025-01-23 | End: 2025-01-23

## 2025-01-23 RX ORDER — LIDOCAINE HYDROCHLORIDE 10 MG/ML
INJECTION, SOLUTION EPIDURAL; INFILTRATION; INTRACAUDAL; PERINEURAL
Status: COMPLETED | OUTPATIENT
Start: 2025-01-23 | End: 2025-01-23

## 2025-01-23 RX ORDER — TRIAMCINOLONE ACETONIDE 40 MG/ML
INJECTION, SUSPENSION INTRA-ARTICULAR; INTRAMUSCULAR
Status: COMPLETED | OUTPATIENT
Start: 2025-01-23 | End: 2025-01-23

## 2025-01-23 RX ADMIN — ROPIVACAINE HYDROCHLORIDE 4 ML: 5 INJECTION, SOLUTION EPIDURAL; INFILTRATION; PERINEURAL at 13:35

## 2025-01-23 RX ADMIN — TRIAMCINOLONE ACETONIDE 40 MG: 40 INJECTION, SUSPENSION INTRA-ARTICULAR; INTRAMUSCULAR at 13:38

## 2025-01-23 RX ADMIN — LIDOCAINE HYDROCHLORIDE 1 ML: 10 INJECTION, SOLUTION EPIDURAL; INFILTRATION; INTRACAUDAL; PERINEURAL at 13:35

## 2025-01-23 ASSESSMENT — PAIN SCALES - GENERAL: PAINLEVEL_OUTOF10: NO PAIN (0)

## 2025-01-23 NOTE — PATIENT INSTRUCTIONS
DISCHARGE INSTRUCTIONS  During office hours (8:00 a.m.- 4:00 p.m.) questions or concerns may be answered  by calling [facility] at  [phone].  Messages received after hours will be returned the following business day.      In the case of an emergency, please dial 911 or seek assistance at the nearest Emergency Room/Urgent Care facility.     You may experience an increase in your symptoms for the first 2 days (It may take anywhere between 2 days- 2 weeks for the steroid to have maximum effect).    You may use ice on the injection site, as frequently as 20 minutes each hour if needed.    You may take your pain medicine.    You may shower. No swimming, tub bath or hot tub for 48 hours.  You may remove your bandaid/bandage as soon as you are home.    You may resume light activities, as tolerated.    Resume your usual diet as tolerated.    POSSIBLE STEROID SIDE EFFECTS (If steroid/cortisone was used for your procedure)  -If you experience these symptoms, it should only last for a short period  Swelling of the legs              Skin redness (flushing)     Mouth (oral) irritation   Blood sugar (glucose) levels            Sweats                    Mood changes  Headache  Sleeplessness  Weakened immune system for up to 14 days, which could increase the risk of ros the COVID-19 virus and/or experiencing more severe symptoms of the disease, if exposed.  Decreased effectiveness of the flu vaccine if given within 2 weeks of the steroid.         POSSIBLE PROCEDURE SIDE EFFECTS  -Call the [facility] if you are concerned  Increased Pain           Increased numbness/tingling      Nausea/Vomiting          Bruising/bleeding at site      Redness or swelling                                              Difficulty walking      Weakness           Fever greater than 100.5

## 2025-02-01 ENCOUNTER — HEALTH MAINTENANCE LETTER (OUTPATIENT)
Age: 70
End: 2025-02-01

## 2025-02-20 ENCOUNTER — OFFICE VISIT (OUTPATIENT)
Dept: PHYSICAL MEDICINE AND REHAB | Facility: CLINIC | Age: 70
End: 2025-02-20
Payer: COMMERCIAL

## 2025-02-20 VITALS — DIASTOLIC BLOOD PRESSURE: 70 MMHG | SYSTOLIC BLOOD PRESSURE: 120 MMHG

## 2025-02-20 DIAGNOSIS — G71.09 HEREDITARY PROGRESSIVE MUSCULAR DYSTROPHY (H): ICD-10-CM

## 2025-02-20 DIAGNOSIS — M17.12 PRIMARY OSTEOARTHRITIS OF LEFT KNEE: Primary | ICD-10-CM

## 2025-02-20 RX ORDER — HYALURONATE SODIUM 10 MG/ML
20 SYRINGE (ML) INTRAARTICULAR WEEKLY
Status: ACTIVE | OUTPATIENT
Start: 2025-03-06 | End: 2025-03-26

## 2025-02-20 ASSESSMENT — PAIN SCALES - GENERAL: PAINLEVEL_OUTOF10: NO PAIN (0)

## 2025-02-20 NOTE — LETTER
2/20/2025      Manuel Schofield  [address]  [facility] [address]      Dear Colleague,    Thank you for referring your patient, Manuel Schofield, to the [facility]. Please see a copy of my visit note below.      Assessment:     Diagnoses and all orders for this visit:  Primary osteoarthritis of left knee  -     PAIN US Large Joint Injection Unilateral; Standing  -     sodium hyaluronate (EUFLEXXA) injection 20 mg  Hereditary progressive muscular dystrophy (H)     Manuel Schofield is a 69 year old y.o. male with past medical history significant for tinnitus, GERD, Crohn's, obesity, postoperative hypothyroidism, type 2 diabetes, hyperlipidemia, muscular dystrophy  who presents today for follow-up regarding chronic right-sided neck pain and left knee pain:     -Patient received mild relief with his left knee ultrasound-guided steroid injection.  He no longer has pain at rest, however with walking is still considerable.    Plan:     A shared decision making plan was used.  The patient's values and choices were respected. Prior medical records from our last visit on 2/21/2024 were reviewed today. The following represents what was discussed and decided upon by the provider and the patient.     -DIAGNOSTIC TESTS: Images were personally reviewed and interpreted.   --MRI of the cervical spine dated 11/3/2004 report is reviewed and does show type II hyperintensity within the cervical spinal cord consistent with history of patient's syrinx from C4-C6.  There is also disc height narrowing at C4-5, C5-6 and C6-7.  -- X-ray of cervical spine dated 4/20/2023 is personally reviewed images interpreted and discussed with the patient.  Does show mild reversal of usual cervical lordosis with slight anterolisthesis of C4 on C5.  There is moderate interspace and endplate degeneration at C5-6 and C6-7.  Suspected facet joint ankylosis at C2-3 through C4-5.  -- MRI of cervical spine dated 12/20/2023 is  personally reviewed images interpreted and discussed with patient.  This is a limited MRI secondary to patient not tolerating MRI machine.  Only sagittal T2 weighted series is obtained.  There is ankylosis present in the C2-C4 segments.  There is severe degenerative disc disease from C5-6, C6-7 and C7-T1.  This is worsened.  There is multilevel facet arthropathy.  Severe foraminal stenosis at C5-6 and C6-7.  There is a suspected cord signal abnormality from C3-4 through C6-7 that is roughly similar to prior images.  -- Left knee x-ray dated 6/9/2020 is personally reviewed images interpreted and discussed with patient.  There is complete loss of the medial compartment of the joint space as well as lateral compartment joint space being preserved with a small marginal osteophyte formation.  There is moderate patellofemoral degenerative changes.                 -INTERVENTIONS: Ordered left knee Euflexxa injections.  Will have this prior authorized and then have him scheduled.    -MEDICATIONS: No changes to medications.  -  Discussed side effects of medications and proper use. Patient verbalized understanding.    -PHYSICAL THERAPY: Recommended continue home exercises on a consistent basis.      -PATIENT EDUCATION: We discussed pain management in a multiple fashion including physical therapy, medication management, possible future injections.    -FOLLOW UP: Patient will follow-up after injections.  Advised to contact clinic if symptoms worsen or change.    Subjective:     Manuel Schofield is a 69 year old male who presents today for follow-up regarding left knee pain.  Patient notes mild relief with his left knee injection.  He no longer has pain at rest and is able to use his stationary bike without pain.  He was recently in [place] and did a lot of walking.  This aggravated and had pain levels that were higher.  Pain currently is 0/10 is worst is 9/10 as best 0/10.  He denies any bowel or bladder changes, fevers,  chills, unintentional weight loss.    No myelopathic symptoms.     -Treatment to Date: MRI of cervical spine dated 11/3/2004.  X-ray of cervical spine dated 4/20/2023.  8 sessions of physical therapy that are recent.  MRI of cervical spine dated 12/20/2023.  Ultrasound-guided left knee injection on 2/7/2024.     Patient Active Problem List   Diagnosis     Hereditary progressive muscular dystrophy (H)     Tinnitus     Family history of prostate cancer     Postoperative hypothyroidism     Gastroesophageal reflux disease without esophagitis     Crohn's disease of small intestine without complication (H)     Morbid obesity (H)     Type 2 diabetes mellitus with hyperglycemia (H)     Hyperlipidemia LDL goal <100     PVD (posterior vitreous detachment), right     Vitreomacular traction syndrome of left eye     Dry eye       Current Outpatient Medications   Medication Sig Dispense Refill     atorvastatin (LIPITOR) 40 MG tablet TAKE 1 TABLET BY MOUTH EVERY EVENING 90 tablet 1     balsalazide (COLAZAL) 750 MG capsule Take 3 capsules by mouth every 12 hours.       blood glucose (ACCU-CHEK GUIDE TEST) test strip USE TO TEST BLOOD SUGAR 1 TIMES DAILY OR AS DIRECTED. 100 strip 3     famotidine (PEPCID) 40 MG tablet Take 1 tablet (40 mg) by mouth 2 times daily as needed for heartburn. 180 tablet 11     levothyroxine (SYNTHROID/LEVOTHROID) 150 MCG tablet TAKE 1 TABLET BY MOUTH EVERY DAY 90 tablet 11     lisinopril (ZESTRIL) 10 MG tablet Take 1 tablet (10 mg) by mouth daily. 90 tablet 11     thin (NO BRAND SPECIFIED) lancets Use with lanceting device. To accompany: Blood Glucose Monitor Brands: per insurance. 100 each 6     Current Facility-Administered Medications   Medication Dose Route Frequency Provider Last Rate Last Admin     [START ON 3/6/2025] sodium hyaluronate (EUFLEXXA) injection 20 mg  20 mg INTRA-ARTICULAR Weekly            Allergies   Allergen Reactions     Peanut-Containing Drug Products      Seasonal Allergies         Past Medical History:   Diagnosis Date     Acid reflux      Chest pain, unspecified 10/01/2004    adenosine cardiolyte:ECG negative, no ischemia, EF 59%     Coronary artery disease      Crohn's disease (H)      Crohn's disease (H)      Esophageal reflux     EGD 2005 normal     Hereditary progressive muscular dystrophy (H)      HTN (hypertension)      Hypertension      Hypothyroidism      Internal hemorrhoids without mention of complication      Motor neuron disease (H)      Type 2 diabetes mellitus with hyperglycemia (H) 10/16/2023     Unspecified tinnitus     chronic, ENT evaluation        Review of Systems  ROS: Specifically negative for bowel/bladder dysfunction, balance changes, headache, dizziness, foot drop, fevers, chills, appetite changes, nausea/vomiting, unexplained weight loss. Otherwise 13 systems reviewed are negative. Please see the patient's intake questionnaire from today for details.    Reviewed Social, Family, Past Medical and Past Surgical history with patient, no significant changes noted since prior visit.     Objective:     /70     --CONSTITUTIONAL: Well developed, well nourished, healthy appearing individual.  --PSYCHIATRIC: Appropriate mood and affect. No difficulty interacting due to temper, social withdrawal, or memory issues.  --RESPIRATORY: Normal rhythm and effort. No abnormal accessory muscle breathing patterns noted.   --MUSCULOSKELETAL:  Normal lumbar lordosis noted, no lateral shift.  --GROSS MOTOR: Easily arises from a seated position. Gait is non-antalgic  --LUMBAR SPINE:  Inspection reveals no evidence of deformity.  --SACROILIAC JOINT:  One Finger point test negative.  --NEUROLOGIC: Sensation to light touch is intact in the bilateral L4, L5, and S1 dermatomes.    Imaging of the cervical spine: Cervical spine and knee imaging was reviewed today.     PAIN US Large Joint Injection Unilateral    Result Date: 1/23/2025  Procedure: Ultrasound guided left knee injection Pre  Procedure Diagnosis: Left knee osteoarthritis Post Procedure Diagnosis:  Same Procedure Performed: Left knee ultrasound-guided injection Clinical Scenario:  As per office notes Vital Signs:  As per rooming/preprocedure documentation Side Injected: Left After discussing the risks, benefits, and alternatives to the procedure, the patient expressed understanding and wished to proceed.  The patient was brought to the procedure suite and placed in the supine position.  A procedural pause was conducted to verify:  correct patient identity, procedure to be performed and as applicable, correct side and site, correct patient position, and availability of implants, special equipment or special requirements.  A simple surgical tray was used.  Prior to the procedure, the left knee was examined with a 12 MHz linear transducer to visualize the left 1 cc knee and determine the optimal needle path. Following this, the area was prepared with a ChloraPrep scrub, then re-examined using the same transducer, a sterile ultrasound transducer cover, and sterile ultrasound transducer gel.  Local anesthesia was obtained with 1 cc of 1% lidocaine. Thereafter, using ultrasound guidance, a 2-inch 25-gauge needle was advanced into the left knee joint in the suprapatellar recess of the joint space. After visualization of the tip and negative aspiration for blood, a mixture of 40 mg of Kenalog and 4 cc of 0.5% ropivacaine was injected into the left knee joint. Following the injection, the needle was withdrawn.  The patient tolerated the procedure well and there were no apparent complications.  After an appropriate amount of observation, the patient was dismissed from the clinic in good condition under their own power.  Images were stored. Pre-Pain Score:  0/10 Post-Pain Score:  0/10 The patient will follow up with Dr. Cruz in 2-4 weeks.                    Again, thank you for allowing me to participate in the care of your patient.         Sincerely,        Ananda Cruz, DO    Electronically signed

## 2025-02-20 NOTE — PATIENT INSTRUCTIONS
I have ordered Euflexxa injections for you.  Will obtain prior authorization and then have you scheduled.    ~Please call Nurse Navigation line [phone] with any questions or concerns about your treatment plan, if symptoms worsen and you would like to be seen urgently, or if you have problems controlling bladder and bowel function.

## 2025-02-20 NOTE — PROGRESS NOTES
Assessment:     Diagnoses and all orders for this visit:  Primary osteoarthritis of left knee  -     PAIN US Large Joint Injection Unilateral; Standing  -     sodium hyaluronate (EUFLEXXA) injection 20 mg  Hereditary progressive muscular dystrophy (H)     Manuel Schofield is a 69 year old y.o. male with past medical history significant for tinnitus, GERD, Crohn's, obesity, postoperative hypothyroidism, type 2 diabetes, hyperlipidemia, muscular dystrophy  who presents today for follow-up regarding chronic right-sided neck pain and left knee pain:     -Patient received mild relief with his left knee ultrasound-guided steroid injection.  He no longer has pain at rest, however with walking is still considerable.    Plan:     A shared decision making plan was used.  The patient's values and choices were respected. Prior medical records from our last visit on 2/21/2024 were reviewed today. The following represents what was discussed and decided upon by the provider and the patient.     -DIAGNOSTIC TESTS: Images were personally reviewed and interpreted.   --MRI of the cervical spine dated 11/3/2004 report is reviewed and does show type II hyperintensity within the cervical spinal cord consistent with history of patient's syrinx from C4-C6.  There is also disc height narrowing at C4-5, C5-6 and C6-7.  -- X-ray of cervical spine dated 4/20/2023 is personally reviewed images interpreted and discussed with the patient.  Does show mild reversal of usual cervical lordosis with slight anterolisthesis of C4 on C5.  There is moderate interspace and endplate degeneration at C5-6 and C6-7.  Suspected facet joint ankylosis at C2-3 through C4-5.  -- MRI of cervical spine dated 12/20/2023 is personally reviewed images interpreted and discussed with patient.  This is a limited MRI secondary to patient not tolerating MRI machine.  Only sagittal T2 weighted series is obtained.  There is ankylosis present in the C2-C4 segments.  There is  severe degenerative disc disease from C5-6, C6-7 and C7-T1.  This is worsened.  There is multilevel facet arthropathy.  Severe foraminal stenosis at C5-6 and C6-7.  There is a suspected cord signal abnormality from C3-4 through C6-7 that is roughly similar to prior images.  -- Left knee x-ray dated 6/9/2020 is personally reviewed images interpreted and discussed with patient.  There is complete loss of the medial compartment of the joint space as well as lateral compartment joint space being preserved with a small marginal osteophyte formation.  There is moderate patellofemoral degenerative changes.                 -INTERVENTIONS: Ordered left knee Euflexxa injections.  Will have this prior authorized and then have him scheduled.    -MEDICATIONS: No changes to medications.  -  Discussed side effects of medications and proper use. Patient verbalized understanding.    -PHYSICAL THERAPY: Recommended continue home exercises on a consistent basis.      -PATIENT EDUCATION: We discussed pain management in a multiple fashion including physical therapy, medication management, possible future injections.    -FOLLOW UP: Patient will follow-up after injections.  Advised to contact clinic if symptoms worsen or change.    Subjective:     Manuel Schofield is a 69 year old male who presents today for follow-up regarding left knee pain.  Patient notes mild relief with his left knee injection.  He no longer has pain at rest and is able to use his stationary bike without pain.  He was recently in [place] and did a lot of walking.  This aggravated and had pain levels that were higher.  Pain currently is 0/10 is worst is 9/10 as best 0/10.  He denies any bowel or bladder changes, fevers, chills, unintentional weight loss.    No myelopathic symptoms.     -Treatment to Date: MRI of cervical spine dated 11/3/2004.  X-ray of cervical spine dated 4/20/2023.  8 sessions of physical therapy that are recent.  MRI of cervical spine dated  12/20/2023.  Ultrasound-guided left knee injection on 2/7/2024.     Patient Active Problem List   Diagnosis    Hereditary progressive muscular dystrophy (H)    Tinnitus    Family history of prostate cancer    Postoperative hypothyroidism    Gastroesophageal reflux disease without esophagitis    Crohn's disease of small intestine without complication (H)    Morbid obesity (H)    Type 2 diabetes mellitus with hyperglycemia (H)    Hyperlipidemia LDL goal <100    PVD (posterior vitreous detachment), right    Vitreomacular traction syndrome of left eye    Dry eye       Current Outpatient Medications   Medication Sig Dispense Refill    atorvastatin (LIPITOR) 40 MG tablet TAKE 1 TABLET BY MOUTH EVERY EVENING 90 tablet 1    balsalazide (COLAZAL) 750 MG capsule Take 3 capsules by mouth every 12 hours.      blood glucose (ACCU-CHEK GUIDE TEST) test strip USE TO TEST BLOOD SUGAR 1 TIMES DAILY OR AS DIRECTED. 100 strip 3    famotidine (PEPCID) 40 MG tablet Take 1 tablet (40 mg) by mouth 2 times daily as needed for heartburn. 180 tablet 11    levothyroxine (SYNTHROID/LEVOTHROID) 150 MCG tablet TAKE 1 TABLET BY MOUTH EVERY DAY 90 tablet 11    lisinopril (ZESTRIL) 10 MG tablet Take 1 tablet (10 mg) by mouth daily. 90 tablet 11    thin (NO BRAND SPECIFIED) lancets Use with lanceting device. To accompany: Blood Glucose Monitor Brands: per insurance. 100 each 6     Current Facility-Administered Medications   Medication Dose Route Frequency Provider Last Rate Last Admin    [START ON 3/6/2025] sodium hyaluronate (EUFLEXXA) injection 20 mg  20 mg INTRA-ARTICULAR Weekly            Allergies   Allergen Reactions    Peanut-Containing Drug Products     Seasonal Allergies        Past Medical History:   Diagnosis Date    Acid reflux     Chest pain, unspecified 10/01/2004    adenosine cardiolyte:ECG negative, no ischemia, EF 59%    Coronary artery disease     Crohn's disease (H)     Crohn's disease (H)     Esophageal reflux     EGD 2005 normal     Hereditary progressive muscular dystrophy (H)     HTN (hypertension)     Hypertension     Hypothyroidism     Internal hemorrhoids without mention of complication     Motor neuron disease (H)     Type 2 diabetes mellitus with hyperglycemia (H) 10/16/2023    Unspecified tinnitus     chronic, ENT evaluation        Review of Systems  ROS: Specifically negative for bowel/bladder dysfunction, balance changes, headache, dizziness, foot drop, fevers, chills, appetite changes, nausea/vomiting, unexplained weight loss. Otherwise 13 systems reviewed are negative. Please see the patient's intake questionnaire from today for details.    Reviewed Social, Family, Past Medical and Past Surgical history with patient, no significant changes noted since prior visit.     Objective:     /70     --CONSTITUTIONAL: Well developed, well nourished, healthy appearing individual.  --PSYCHIATRIC: Appropriate mood and affect. No difficulty interacting due to temper, social withdrawal, or memory issues.  --RESPIRATORY: Normal rhythm and effort. No abnormal accessory muscle breathing patterns noted.   --MUSCULOSKELETAL:  Normal lumbar lordosis noted, no lateral shift.  --GROSS MOTOR: Easily arises from a seated position. Gait is non-antalgic  --LUMBAR SPINE:  Inspection reveals no evidence of deformity.  --SACROILIAC JOINT:  One Finger point test negative.  --NEUROLOGIC: Sensation to light touch is intact in the bilateral L4, L5, and S1 dermatomes.    Imaging of the cervical spine: Cervical spine and knee imaging was reviewed today.     PAIN US Large Joint Injection Unilateral    Result Date: 1/23/2025  Procedure: Ultrasound guided left knee injection Pre Procedure Diagnosis: Left knee osteoarthritis Post Procedure Diagnosis:  Same Procedure Performed: Left knee ultrasound-guided injection Clinical Scenario:  As per office notes Vital Signs:  As per rooming/preprocedure documentation Side Injected: Left After discussing the risks,  benefits, and alternatives to the procedure, the patient expressed understanding and wished to proceed.  The patient was brought to the procedure suite and placed in the supine position.  A procedural pause was conducted to verify:  correct patient identity, procedure to be performed and as applicable, correct side and site, correct patient position, and availability of implants, special equipment or special requirements.  A simple surgical tray was used.  Prior to the procedure, the left knee was examined with a 12 MHz linear transducer to visualize the left 1 cc knee and determine the optimal needle path. Following this, the area was prepared with a ChloraPrep scrub, then re-examined using the same transducer, a sterile ultrasound transducer cover, and sterile ultrasound transducer gel.  Local anesthesia was obtained with 1 cc of 1% lidocaine. Thereafter, using ultrasound guidance, a 2-inch 25-gauge needle was advanced into the left knee joint in the suprapatellar recess of the joint space. After visualization of the tip and negative aspiration for blood, a mixture of 40 mg of Kenalog and 4 cc of 0.5% ropivacaine was injected into the left knee joint. Following the injection, the needle was withdrawn.  The patient tolerated the procedure well and there were no apparent complications.  After an appropriate amount of observation, the patient was dismissed from the clinic in good condition under their own power.  Images were stored. Pre-Pain Score:  0/10 Post-Pain Score:  0/10 The patient will follow up with Dr. Cruz in 2-4 weeks.

## 2025-04-02 ENCOUNTER — RADIOLOGY INJECTION OFFICE VISIT (OUTPATIENT)
Dept: PHYSICAL MEDICINE AND REHAB | Facility: CLINIC | Age: 70
End: 2025-04-02
Attending: PAIN MEDICINE
Payer: COMMERCIAL

## 2025-04-02 VITALS
OXYGEN SATURATION: 96 % | SYSTOLIC BLOOD PRESSURE: 142 MMHG | DIASTOLIC BLOOD PRESSURE: 69 MMHG | HEART RATE: 58 BPM | TEMPERATURE: 97.2 F

## 2025-04-02 DIAGNOSIS — M17.12 PRIMARY OSTEOARTHRITIS OF LEFT KNEE: ICD-10-CM

## 2025-04-02 PROCEDURE — 20611 DRAIN/INJ JOINT/BURSA W/US: CPT | Mod: LT | Performed by: PAIN MEDICINE

## 2025-04-02 RX ORDER — LIDOCAINE HYDROCHLORIDE 10 MG/ML
INJECTION, SOLUTION EPIDURAL; INFILTRATION; INTRACAUDAL; PERINEURAL
Status: COMPLETED | OUTPATIENT
Start: 2025-04-02 | End: 2025-04-02

## 2025-04-02 RX ADMIN — LIDOCAINE HYDROCHLORIDE 1 ML: 10 INJECTION, SOLUTION EPIDURAL; INFILTRATION; INTRACAUDAL; PERINEURAL at 08:09

## 2025-04-02 ASSESSMENT — PAIN SCALES - GENERAL: PAINLEVEL_OUTOF10: MILD PAIN (3)

## 2025-04-02 NOTE — PATIENT INSTRUCTIONS
DISCHARGE INSTRUCTIONS    During office hours (8:00 a.m.- 4:00 p.m.) questions or concerns may be answered  by calling [facility] Navigation Nurses at  [phone].  Messages received after hours will be returned the following business day.      In the case of an emergency, please dial 911 or seek assistance at the nearest Emergency Room/Urgent Care facility.     All Patients:    You may experience an increase in your symptoms for the first 2 days (It may take 2-3 weeks for the medication to have maximum effect).    You may use ice on the injection site, as frequently as 20 minutes each hour if needed.    You may take your pain medicine.    You may continue taking your regular medication after your injection.    You may shower. No swimming, tub bath or hot tub for 48 hours.  You may remove your bandaid/bandage as soon as you are home.    You may resume light activities, as tolerated.    Resume your usual diet as tolerated.      POSSIBLE EUFLEXXA SIDE EFFECTS    -If you experience these symptoms, it should only last for a short period    Swelling at injection site  Swelling of the knee joint              Bruising under the skin     Decreased appetite  Headache  Itching  Nausea  Diarrhea  Irritation to the stomach or intestines         POSSIBLE PROCEDURE SIDE EFFECTS  -Call the Spine Center if you are concerned  Increased Pain           Increased numbness/tingling      Nausea/Vomiting          Bruising/bleeding at site      Redness or swelling                                              Difficulty walking      Weakness           Fever greater than 100.5

## 2025-04-08 ENCOUNTER — OFFICE VISIT (OUTPATIENT)
Dept: PEDIATRICS | Facility: CLINIC | Age: 70
End: 2025-04-08
Payer: COMMERCIAL

## 2025-04-08 VITALS
TEMPERATURE: 97.3 F | SYSTOLIC BLOOD PRESSURE: 119 MMHG | HEART RATE: 57 BPM | OXYGEN SATURATION: 97 % | HEIGHT: 69 IN | RESPIRATION RATE: 16 BRPM | WEIGHT: 251.8 LBS | DIASTOLIC BLOOD PRESSURE: 74 MMHG | BODY MASS INDEX: 37.29 KG/M2

## 2025-04-08 DIAGNOSIS — E78.5 HYPERLIPIDEMIA LDL GOAL <100: ICD-10-CM

## 2025-04-08 DIAGNOSIS — E11.65 TYPE 2 DIABETES MELLITUS WITH HYPERGLYCEMIA, WITHOUT LONG-TERM CURRENT USE OF INSULIN (H): Primary | ICD-10-CM

## 2025-04-08 DIAGNOSIS — E89.0 POSTOPERATIVE HYPOTHYROIDISM: ICD-10-CM

## 2025-04-08 DIAGNOSIS — K50.00 CROHN'S DISEASE OF SMALL INTESTINE WITHOUT COMPLICATION (H): ICD-10-CM

## 2025-04-08 DIAGNOSIS — M17.12 PRIMARY OSTEOARTHRITIS OF LEFT KNEE: ICD-10-CM

## 2025-04-08 DIAGNOSIS — E66.01 MORBID OBESITY (H): ICD-10-CM

## 2025-04-08 DIAGNOSIS — I10 ESSENTIAL HYPERTENSION: ICD-10-CM

## 2025-04-08 LAB
ALBUMIN SERPL BCG-MCNC: 4.1 G/DL (ref 3.5–5.2)
ALP SERPL-CCNC: 61 U/L (ref 40–150)
ALT SERPL W P-5'-P-CCNC: 7 U/L (ref 0–70)
ANION GAP SERPL CALCULATED.3IONS-SCNC: 11 MMOL/L (ref 7–15)
AST SERPL W P-5'-P-CCNC: 18 U/L (ref 0–45)
BILIRUB SERPL-MCNC: 0.5 MG/DL
BUN SERPL-MCNC: 12.1 MG/DL (ref 8–23)
CALCIUM SERPL-MCNC: 8.6 MG/DL (ref 8.8–10.4)
CHLORIDE SERPL-SCNC: 103 MMOL/L (ref 98–107)
CHOLEST SERPL-MCNC: 110 MG/DL
CREAT SERPL-MCNC: 0.69 MG/DL (ref 0.67–1.17)
CREAT UR-MCNC: 136 MG/DL
EGFRCR SERPLBLD CKD-EPI 2021: >90 ML/MIN/1.73M2
EST. AVERAGE GLUCOSE BLD GHB EST-MCNC: 146 MG/DL
FASTING STATUS PATIENT QL REPORTED: YES
FASTING STATUS PATIENT QL REPORTED: YES
GLUCOSE SERPL-MCNC: 124 MG/DL (ref 70–99)
HBA1C MFR BLD: 6.7 % (ref 0–5.6)
HCO3 SERPL-SCNC: 23 MMOL/L (ref 22–29)
HDLC SERPL-MCNC: 36 MG/DL
LDLC SERPL CALC-MCNC: 56 MG/DL
MICROALBUMIN UR-MCNC: <12 MG/L
MICROALBUMIN/CREAT UR: NORMAL MG/G{CREAT}
NONHDLC SERPL-MCNC: 74 MG/DL
POTASSIUM SERPL-SCNC: 4.2 MMOL/L (ref 3.4–5.3)
PROT SERPL-MCNC: 7 G/DL (ref 6.4–8.3)
SODIUM SERPL-SCNC: 137 MMOL/L (ref 135–145)
TRIGL SERPL-MCNC: 92 MG/DL
TSH SERPL DL<=0.005 MIU/L-ACNC: 3.45 UIU/ML (ref 0.3–4.2)

## 2025-04-08 PROCEDURE — 80061 LIPID PANEL: CPT | Performed by: INTERNAL MEDICINE

## 2025-04-08 PROCEDURE — 83036 HEMOGLOBIN GLYCOSYLATED A1C: CPT | Performed by: INTERNAL MEDICINE

## 2025-04-08 PROCEDURE — 99214 OFFICE O/P EST MOD 30 MIN: CPT | Performed by: INTERNAL MEDICINE

## 2025-04-08 PROCEDURE — 80053 COMPREHEN METABOLIC PANEL: CPT | Performed by: INTERNAL MEDICINE

## 2025-04-08 PROCEDURE — 1126F AMNT PAIN NOTED NONE PRSNT: CPT | Performed by: INTERNAL MEDICINE

## 2025-04-08 PROCEDURE — 3078F DIAST BP <80 MM HG: CPT | Performed by: INTERNAL MEDICINE

## 2025-04-08 PROCEDURE — 84443 ASSAY THYROID STIM HORMONE: CPT | Performed by: INTERNAL MEDICINE

## 2025-04-08 PROCEDURE — 36415 COLL VENOUS BLD VENIPUNCTURE: CPT | Performed by: INTERNAL MEDICINE

## 2025-04-08 PROCEDURE — 82570 ASSAY OF URINE CREATININE: CPT | Performed by: INTERNAL MEDICINE

## 2025-04-08 PROCEDURE — 3074F SYST BP LT 130 MM HG: CPT | Performed by: INTERNAL MEDICINE

## 2025-04-08 PROCEDURE — 82043 UR ALBUMIN QUANTITATIVE: CPT | Performed by: INTERNAL MEDICINE

## 2025-04-08 RX ORDER — ATORVASTATIN CALCIUM 40 MG/1
40 TABLET, FILM COATED ORAL EVERY EVENING
Qty: 90 TABLET | Refills: 11 | Status: SHIPPED | OUTPATIENT
Start: 2025-04-08

## 2025-04-08 RX ORDER — LISINOPRIL 10 MG/1
10 TABLET ORAL DAILY
Qty: 90 TABLET | Refills: 11 | Status: SHIPPED | OUTPATIENT
Start: 2025-04-08

## 2025-04-08 RX ORDER — LEVOTHYROXINE SODIUM 150 UG/1
150 TABLET ORAL DAILY
Qty: 90 TABLET | Refills: 11 | Status: CANCELLED | OUTPATIENT
Start: 2025-04-08

## 2025-04-08 ASSESSMENT — PAIN SCALES - GENERAL: PAINLEVEL_OUTOF10: NO PAIN (0)

## 2025-04-08 NOTE — PROGRESS NOTES
"  Assessment & Plan     (E11.65) Type 2 diabetes mellitus with hyperglycemia, without long-term current use of insulin (H)  (primary encounter diagnosis)  Comment:   Plan: HEMOGLOBIN A1C, Lipid panel reflex to direct         LDL Non-fasting, Albumin Random Urine         Quantitative with Creat Ratio        Diet controlled, due for labwork    (I10) Essential hypertension  Comment:   Plan: lisinopril (ZESTRIL) 10 MG tablet        Well controlled    (E78.5) Hyperlipidemia LDL goal <100  Comment:   Plan: atorvastatin (LIPITOR) 40 MG tablet,         Comprehensive metabolic panel (BMP + Alb, Alk         Phos, ALT, AST, Total. Bili, TP)        Tolerating statin, well controlled    (E89.0) Postoperative hypothyroidism  Comment:   Plan: TSH with free T4 reflex          (K50.00) Crohn's disease of small intestine without complication (H)  Comment:   Plan: stable on balsalazide    (M17.12) Primary osteoarthritis of left knee  Comment:   Plan: recent steroid injection, pain improved    (E66.01) obesity, severe  Comment:   Plan: hx of obesity complicating DM, HTN, hyperlipidemia.            BMI  Estimated body mass index is 37.18 kg/m  as calculated from the following:    Height as of this encounter: 1.753 m (5' 9\").    Weight as of this encounter: 114.2 kg (251 lb 12.8 oz).             Ray Paredes is a 70 year old, presenting for the following health issues:  Diabetes        4/8/2025     6:46 AM   Additional Questions   Roomed by flower   Accompanied by leonela         4/8/2025     6:46 AM   Patient Reported Additional Medications   Patient reports taking the following new medications na     History of Present Illness       Diabetes:   He presents for follow up of diabetes.  He is checking home blood glucose one time daily.   He checks blood glucose before meals.  Blood glucose is sometimes over 200 and sometimes under 70.  When his blood glucose is low, the patient is asymptomatic for confusion, blurred vision, lethargy and " reports not feeling dizzy, shaky, or weak.   He has no concerns regarding his diabetes at this time.  He is having numbness in feet and weight gain.            He eats 2-3 servings of fruits and vegetables daily.He consumes 1 sweetened beverage(s) daily.He exercises with enough effort to increase his heart rate 20 to 29 minutes per day.  He exercises with enough effort to increase his heart rate 3 or less days per week.   He is taking medications regularly.        BP     119/74  4/8/2025    Lab Results   Component Value Date    GLC 97 01/23/2025     Lab Results   Component Value Date    A1C 6.4 10/08/2024     Lab Results   Component Value Date    LDL 43 02/28/2024    LDL 41 04/05/2021     Lab Results   Component Value Date    MICROL <12.0 02/28/2024       Patient Active Problem List   Diagnosis    Hereditary progressive muscular dystrophy (H)    Tinnitus    Family history of prostate cancer    Postoperative hypothyroidism    Essential hypertension    Gastroesophageal reflux disease without esophagitis    Crohn's disease of small intestine without complication (H)    Morbid obesity (H)    Type 2 diabetes mellitus with hyperglycemia (H)    Hyperlipidemia LDL goal <100    PVD (posterior vitreous detachment), right    Vitreomacular traction syndrome of left eye    Dry eye     Current Outpatient Medications   Medication Sig Dispense Refill    atorvastatin (LIPITOR) 40 MG tablet TAKE 1 TABLET BY MOUTH EVERY EVENING 90 tablet 1    balsalazide (COLAZAL) 750 MG capsule Take 3 capsules by mouth every 12 hours.      blood glucose (ACCU-CHEK GUIDE TEST) test strip USE TO TEST BLOOD SUGAR 1 TIMES DAILY OR AS DIRECTED. 100 strip 3    famotidine (PEPCID) 40 MG tablet Take 1 tablet (40 mg) by mouth 2 times daily as needed for heartburn. 180 tablet 11    levothyroxine (SYNTHROID/LEVOTHROID) 150 MCG tablet TAKE 1 TABLET BY MOUTH EVERY DAY 90 tablet 11    lisinopril (ZESTRIL) 10 MG tablet Take 1 tablet (10 mg) by mouth daily. 90  "tablet 11    thin (NO BRAND SPECIFIED) lancets Use with lanceting device. To accompany: Blood Glucose Monitor Brands: per insurance. 100 each 6            Objective    /74   Pulse 57   Temp 97.3  F (36.3  C) (Temporal)   Resp 16   Ht 1.753 m (5' 9\")   Wt 114.2 kg (251 lb 12.8 oz)   SpO2 97%   BMI 37.18 kg/m    Body mass index is 37.18 kg/m .  Physical Exam   GENERAL: alert and no distress  EYES: Eyes grossly normal to inspection, PERRL and conjunctivae and sclerae normal  NECK: no adenopathy, no asymmetry, masses, or scars  RESP: lungs clear to auscultation - no rales, rhonchi or wheezes  CV: regular rate and rhythm, normal S1 S2, no S3 or S4, no murmur, click or rub, no peripheral edema  MS: no gross musculoskeletal defects noted, no edema  PSYCH: mentation appears normal, affect normal/bright            Signed Electronically by: Vinod Chris MD    "

## 2025-04-09 ENCOUNTER — RADIOLOGY INJECTION OFFICE VISIT (OUTPATIENT)
Dept: PHYSICAL MEDICINE AND REHAB | Facility: CLINIC | Age: 70
End: 2025-04-09
Attending: PAIN MEDICINE
Payer: COMMERCIAL

## 2025-04-09 VITALS
DIASTOLIC BLOOD PRESSURE: 85 MMHG | TEMPERATURE: 97.3 F | OXYGEN SATURATION: 97 % | SYSTOLIC BLOOD PRESSURE: 143 MMHG | HEART RATE: 58 BPM

## 2025-04-09 DIAGNOSIS — M17.12 PRIMARY OSTEOARTHRITIS OF LEFT KNEE: ICD-10-CM

## 2025-04-09 PROCEDURE — 20611 DRAIN/INJ JOINT/BURSA W/US: CPT | Mod: LT | Performed by: PAIN MEDICINE

## 2025-04-09 RX ORDER — LIDOCAINE HYDROCHLORIDE 10 MG/ML
INJECTION, SOLUTION EPIDURAL; INFILTRATION; INTRACAUDAL; PERINEURAL
Status: COMPLETED | OUTPATIENT
Start: 2025-04-09 | End: 2025-04-09

## 2025-04-09 RX ADMIN — LIDOCAINE HYDROCHLORIDE 1 ML: 10 INJECTION, SOLUTION EPIDURAL; INFILTRATION; INTRACAUDAL; PERINEURAL at 14:14

## 2025-04-09 ASSESSMENT — PAIN SCALES - GENERAL: PAINLEVEL_OUTOF10: MILD PAIN (2)

## 2025-04-09 NOTE — PATIENT INSTRUCTIONS
DISCHARGE INSTRUCTIONS    During office hours (8:00 a.m.- 4:00 p.m.) questions or concerns may be answered  by calling [facility] Navigation Nurses at  [phone].  Messages received after hours will be returned the following business day.      In the case of an emergency, please dial 911 or seek assistance at the nearest Emergency Room/Urgent Care facility.     All Patients:    You may experience an increase in your symptoms for the first 2 days (It may take 2-3 weeks for the medication to have maximum effect).    You may use ice on the injection site, as frequently as 20 minutes each hour if needed.    You may take your pain medicine.    You may continue taking your regular medication after your injection.    You may shower. No swimming, tub bath or hot tub for 48 hours.  You may remove your bandaid/bandage as soon as you are home.    You may resume light activities, as tolerated.    Resume your usual diet as tolerated.      POSSIBLE EUFLEXXA SIDE EFFECTS    -If you experience these symptoms, it should only last for a short period    Swelling at injection site  Swelling of the knee joint              Bruising under the skin     Decreased appetite  Headache  Itching  Nausea  Diarrhea  Irritation to the stomach or intestines         POSSIBLE PROCEDURE SIDE EFFECTS  -Call the Spine Center if you are concerned  Increased Pain           Increased numbness/tingling      Nausea/Vomiting          Bruising/bleeding at site      Redness or swelling                                              Difficulty walking      Weakness           Fever greater than 100.5

## 2025-04-15 NOTE — PATIENT INSTRUCTIONS
DISCHARGE INSTRUCTIONS    During office hours (8:00 a.m.- 4:00 p.m.) questions or concerns may be answered  by calling [facility] Nurses at  [phone].  Messages received after hours will be returned the following business day.      In the case of an emergency, please dial 911 or seek assistance at the nearest Emergency Room/Urgent Care facility.     All Patients:    You may experience an increase in your symptoms for the first 2 days (It may take 2-3 weeks for the medication to have maximum effect).    You may use ice on the injection site, as frequently as 20 minutes each hour if needed.    You may take your pain medicine.    You may continue taking your regular medication after your injection.    You may shower. No swimming, tub bath or hot tub for 48 hours.  You may remove your bandaid/bandage as soon as you are home.    You may resume light activities, as tolerated.    Resume your usual diet as tolerated.      POSSIBLE EUFLEXXA SIDE EFFECTS    -If you experience these symptoms, it should only last for a short period    Swelling at injection site  Swelling of the knee joint              Bruising under the skin     Decreased appetite  Headache  Itching  Nausea  Diarrhea  Irritation to the stomach or intestines         POSSIBLE PROCEDURE SIDE EFFECTS  -Call the Spine Center if you are concerned  Increased Pain           Increased numbness/tingling      Nausea/Vomiting          Bruising/bleeding at site      Redness or swelling                                              Difficulty walking      Weakness           Fever greater than 100.5

## 2025-04-16 ENCOUNTER — RADIOLOGY INJECTION OFFICE VISIT (OUTPATIENT)
Dept: PHYSICAL MEDICINE AND REHAB | Facility: CLINIC | Age: 70
End: 2025-04-16
Attending: PAIN MEDICINE
Payer: COMMERCIAL

## 2025-04-16 VITALS
HEART RATE: 63 BPM | DIASTOLIC BLOOD PRESSURE: 66 MMHG | SYSTOLIC BLOOD PRESSURE: 137 MMHG | TEMPERATURE: 97.5 F | OXYGEN SATURATION: 98 %

## 2025-04-16 DIAGNOSIS — M17.12 PRIMARY OSTEOARTHRITIS OF LEFT KNEE: ICD-10-CM

## 2025-04-16 PROCEDURE — 20611 DRAIN/INJ JOINT/BURSA W/US: CPT | Mod: LT | Performed by: PAIN MEDICINE

## 2025-04-16 RX ORDER — LIDOCAINE HYDROCHLORIDE 10 MG/ML
INJECTION, SOLUTION EPIDURAL; INFILTRATION; INTRACAUDAL; PERINEURAL
Status: COMPLETED | OUTPATIENT
Start: 2025-04-16 | End: 2025-04-16

## 2025-04-16 RX ADMIN — LIDOCAINE HYDROCHLORIDE 1 ML: 10 INJECTION, SOLUTION EPIDURAL; INFILTRATION; INTRACAUDAL; PERINEURAL at 08:35

## 2025-04-16 ASSESSMENT — PAIN SCALES - GENERAL
PAINLEVEL_OUTOF10: NO PAIN (0)
PAINLEVEL_OUTOF10: NO PAIN (0)

## 2025-05-29 ENCOUNTER — OFFICE VISIT (OUTPATIENT)
Dept: PHYSICAL MEDICINE AND REHAB | Facility: CLINIC | Age: 70
End: 2025-05-29
Payer: COMMERCIAL

## 2025-05-29 VITALS
HEIGHT: 70 IN | HEART RATE: 60 BPM | SYSTOLIC BLOOD PRESSURE: 148 MMHG | OXYGEN SATURATION: 95 % | WEIGHT: 250 LBS | BODY MASS INDEX: 35.79 KG/M2 | DIASTOLIC BLOOD PRESSURE: 75 MMHG

## 2025-05-29 DIAGNOSIS — G71.09 HEREDITARY PROGRESSIVE MUSCULAR DYSTROPHY (H): ICD-10-CM

## 2025-05-29 DIAGNOSIS — M17.12 PRIMARY OSTEOARTHRITIS OF LEFT KNEE: Primary | ICD-10-CM

## 2025-05-29 ASSESSMENT — PAIN SCALES - GENERAL: PAINLEVEL_OUTOF10: NO PAIN (0)

## 2025-05-29 NOTE — LETTER
5/29/2025      Manuel Schofield  [address]  [place] [address]      Dear Colleague,    Thank you for referring your patient, Manuel Schofield, to the [facility]. Please see a copy of my visit note below.      Assessment:     Diagnoses and all orders for this visit:  Primary osteoarthritis of left knee  -     Orthopedic  Referral; Future  Hereditary progressive muscular dystrophy (H)     Manuel Schofield is a 70 year old y.o. male with past medical history significant for tinnitus, GERD, Crohn's, obesity, postoperative hypothyroidism, type 2 diabetes, hyperlipidemia, muscular dystrophy  who presents today for follow-up regarding chronic left knee pain:    - Unfortunately patient did not receive relief with Euflexxa injections.  I have placed a referral for him to see an orthopedic surgeon at [facility] as it is convenient to where he lives.     Plan:     A shared decision making plan was used. The patient's values and choices were respected. Prior medical records from our last visit on 2/20/2025 were reviewed today. The following represents what was discussed and decided upon by the provider and the patient.        -DIAGNOSTIC TESTS: Images were personally reviewed and interpreted.   --MRI of the cervical spine dated 11/3/2004 report is reviewed and does show type II hyperintensity within the cervical spinal cord consistent with history of patient's syrinx from C4-C6.  There is also disc height narrowing at C4-5, C5-6 and C6-7.  -- X-ray of cervical spine dated 4/20/2023 is personally reviewed images interpreted and discussed with the patient.  Does show mild reversal of usual cervical lordosis with slight anterolisthesis of C4 on C5.  There is moderate interspace and endplate degeneration at C5-6 and C6-7.  Suspected facet joint ankylosis at C2-3 through C4-5.  -- MRI of cervical spine dated 12/20/2023 is personally reviewed images interpreted and discussed with patient.   This is a limited MRI secondary to patient not tolerating MRI machine.  Only sagittal T2 weighted series is obtained.  There is ankylosis present in the C2-C4 segments.  There is severe degenerative disc disease from C5-6, C6-7 and C7-T1.  This is worsened.  There is multilevel facet arthropathy.  Severe foraminal stenosis at C5-6 and C6-7.  There is a suspected cord signal abnormality from C3-4 through C6-7 that is roughly similar to prior images.  -- Left knee x-ray dated 6/9/2020 is personally reviewed images interpreted and discussed with patient.  There is complete loss of the medial compartment of the joint space as well as lateral compartment joint space being preserved with a small marginal osteophyte formation.  There is moderate patellofemoral degenerative changes.    -INTERVENTIONS: No interventions at this time.    -MEDICATIONS: No changes to medications.  -  Discussed side effects of medications and proper use. Patient verbalized understanding.    -PHYSICAL THERAPY: Recommended continue with home exercises on a consistent basis.     -PATIENT EDUCATION: We discussed pain management today multiple fashion including physical therapy, medication management, reasoning for orthopedic surgery consult.    -FOLLOW UP: Patient will follow-up as needed.  Advised to contact clinic if symptoms worsen or change.    Subjective:     Manuel Schofield is a 70 year old male who presents today for follow-up regarding chronic left knee pain.  Patient notes that he continues to have left knee pain despite Euflexxa injections.  He notes that he feels like his leg is giving out even more now than it has in the past.  If he stands or walks pain is worse.  At rest pain is 0/10.  He is interested in seeing someone for potentially having a knee replacement.  He denies any bowel or bladder changes, fevers, chills, unintentional weight loss.    -Treatment to Date: MRI of cervical spine dated 11/3/2004.  X-ray of cervical spine dated  4/20/2023.  8 sessions of physical therapy that are recent.  MRI of cervical spine dated 12/20/2023.  Ultrasound-guided left knee injection on 2/7/2024.  Left knee Euflexxa injections on 4/2/2025, 4/9/2025 and 4/16/2025.    Patient Active Problem List   Diagnosis     Hereditary progressive muscular dystrophy (H)     Tinnitus     Family history of prostate cancer     Postoperative hypothyroidism     Essential hypertension     Gastroesophageal reflux disease without esophagitis     Crohn's disease of small intestine without complication (H)     Morbid obesity (H)     Type 2 diabetes mellitus with hyperglycemia (H)     Hyperlipidemia LDL goal <100     PVD (posterior vitreous detachment), right     Vitreomacular traction syndrome of left eye     Dry eye     Primary osteoarthritis of left knee       Current Outpatient Medications   Medication Sig Dispense Refill     atorvastatin (LIPITOR) 40 MG tablet Take 1 tablet (40 mg) by mouth every evening. 90 tablet 11     balsalazide (COLAZAL) 750 MG capsule Take 3 capsules by mouth every 12 hours.       blood glucose (ACCU-CHEK GUIDE TEST) test strip USE TO TEST BLOOD SUGAR 1 TIMES DAILY OR AS DIRECTED. 100 strip 3     famotidine (PEPCID) 40 MG tablet Take 1 tablet (40 mg) by mouth 2 times daily as needed for heartburn. 180 tablet 11     levothyroxine (SYNTHROID/LEVOTHROID) 150 MCG tablet TAKE 1 TABLET BY MOUTH EVERY DAY 90 tablet 11     lisinopril (ZESTRIL) 10 MG tablet Take 1 tablet (10 mg) by mouth daily. 90 tablet 11     thin (NO BRAND SPECIFIED) lancets Use with lanceting device. To accompany: Blood Glucose Monitor Brands: per insurance. 100 each 6     No current facility-administered medications for this visit.       Allergies   Allergen Reactions     Peanut-Containing Drug Products      Seasonal Allergies        Past Medical History:   Diagnosis Date     Acid reflux      Chest pain, unspecified 10/01/2004    adenosine cardiolyte:ECG negative, no ischemia, EF 59%      "Coronary artery disease      Crohn's disease (H)      Crohn's disease (H)      Esophageal reflux     EGD 2005 normal     Hereditary progressive muscular dystrophy (H)      HTN (hypertension)      Hypertension      Hypothyroidism      Internal hemorrhoids without mention of complication      Motor neuron disease (H)      Type 2 diabetes mellitus with hyperglycemia (H) 10/16/2023     Unspecified tinnitus     chronic, ENT evaluation        Review of Systems  ROS:   Specifically negative for bowel/bladder dysfunction, balance changes, headache, dizziness, foot drop, fevers, chills, appetite changes, nausea/vomiting, unexplained weight loss. Otherwise 13 systems reviewed are negative. Please see the patient's intake questionnaire from today for details.    Reviewed Social, Family, Past Medical and Past Surgical history with patient, no significant changes noted since prior visit.     Objective:     BP (!) 148/75 (BP Location: Left arm, Patient Position: Sitting, Cuff Size: Adult Regular)   Pulse 60   Ht 5' 10\" (1.778 m)   Wt 250 lb (113.4 kg)   SpO2 95%   BMI 35.87 kg/m      PHYSICAL EXAMINATION:    --CONSTITUTIONAL: Well developed, well nourished, healthy appearing individual.  --PSYCHIATRIC: Appropriate mood and affect. No difficulty interacting due to temper, social withdrawal, or memory issues.  --SKIN: Knee region is clean and dry and intact.  --RESPIRATORY: Normal rhythm and effort. No abnormal accessory muscle breathing patterns noted.   --GROSS MOTOR: Easily arises from a seated position. Gait is antalgic  --LUMBAR SPINE:  Inspection reveals no evidence of deformity. Range of motion is mildly limited in lumbar flexion, extension, lateral rotation.   --SACROILIAC JOINT:  One Finger point test negative.  --NEUROLOGIC:  Sensation to light touch is intact in the bilateral L4, L5, and S1 dermatomes.    RESULTS:   Imaging: Lumbar spine and knee imaging was reviewed today.          ;    Again, thank you for " allowing me to participate in the care of your patient.        Sincerely,        Ananda Cruz, DO    Electronically signed

## 2025-05-29 NOTE — PATIENT INSTRUCTIONS
I placed a referral to [facility] in [place] for orthopedic surgery.  We will fax this to them and they should call you to get you scheduled.    ~Please call Nurse Navigation line [phone] with any questions or concerns about your treatment plan, if symptoms worsen and you would like to be seen urgently, or if you have problems controlling bladder and bowel function.

## 2025-05-29 NOTE — PROGRESS NOTES
Assessment:     Diagnoses and all orders for this visit:  Primary osteoarthritis of left knee  -     Orthopedic  Referral; Future  Hereditary progressive muscular dystrophy (H)     Manuel Schofield is a 70 year old y.o. male with past medical history significant for tinnitus, GERD, Crohn's, obesity, postoperative hypothyroidism, type 2 diabetes, hyperlipidemia, muscular dystrophy  who presents today for follow-up regarding chronic left knee pain:    - Unfortunately patient did not receive relief with Euflexxa injections.  I have placed a referral for him to see an orthopedic surgeon at [facility] as it is convenient to where he lives.     Plan:     A shared decision making plan was used. The patient's values and choices were respected. Prior medical records from our last visit on 2/20/2025 were reviewed today. The following represents what was discussed and decided upon by the provider and the patient.        -DIAGNOSTIC TESTS: Images were personally reviewed and interpreted.   --MRI of the cervical spine dated 11/3/2004 report is reviewed and does show type II hyperintensity within the cervical spinal cord consistent with history of patient's syrinx from C4-C6.  There is also disc height narrowing at C4-5, C5-6 and C6-7.  -- X-ray of cervical spine dated 4/20/2023 is personally reviewed images interpreted and discussed with the patient.  Does show mild reversal of usual cervical lordosis with slight anterolisthesis of C4 on C5.  There is moderate interspace and endplate degeneration at C5-6 and C6-7.  Suspected facet joint ankylosis at C2-3 through C4-5.  -- MRI of cervical spine dated 12/20/2023 is personally reviewed images interpreted and discussed with patient.  This is a limited MRI secondary to patient not tolerating MRI machine.  Only sagittal T2 weighted series is obtained.  There is ankylosis present in the C2-C4 segments.  There is severe degenerative disc disease from C5-6, C6-7 and C7-T1.   This is worsened.  There is multilevel facet arthropathy.  Severe foraminal stenosis at C5-6 and C6-7.  There is a suspected cord signal abnormality from C3-4 through C6-7 that is roughly similar to prior images.  -- Left knee x-ray dated 6/9/2020 is personally reviewed images interpreted and discussed with patient.  There is complete loss of the medial compartment of the joint space as well as lateral compartment joint space being preserved with a small marginal osteophyte formation.  There is moderate patellofemoral degenerative changes.    -INTERVENTIONS: No interventions at this time.    -MEDICATIONS: No changes to medications.  -  Discussed side effects of medications and proper use. Patient verbalized understanding.    -PHYSICAL THERAPY: Recommended continue with home exercises on a consistent basis.     -PATIENT EDUCATION: We discussed pain management today multiple fashion including physical therapy, medication management, reasoning for orthopedic surgery consult.    -FOLLOW UP: Patient will follow-up as needed.  Advised to contact clinic if symptoms worsen or change.    Subjective:     Manuel Schofield is a 70 year old male who presents today for follow-up regarding chronic left knee pain.  Patient notes that he continues to have left knee pain despite Euflexxa injections.  He notes that he feels like his leg is giving out even more now than it has in the past.  If he stands or walks pain is worse.  At rest pain is 0/10.  He is interested in seeing someone for potentially having a knee replacement.  He denies any bowel or bladder changes, fevers, chills, unintentional weight loss.    -Treatment to Date: MRI of cervical spine dated 11/3/2004.  X-ray of cervical spine dated 4/20/2023.  8 sessions of physical therapy that are recent.  MRI of cervical spine dated 12/20/2023.  Ultrasound-guided left knee injection on 2/7/2024.  Left knee Euflexxa injections on 4/2/2025, 4/9/2025 and 4/16/2025.    Patient Active  Problem List   Diagnosis    Hereditary progressive muscular dystrophy (H)    Tinnitus    Family history of prostate cancer    Postoperative hypothyroidism    Essential hypertension    Gastroesophageal reflux disease without esophagitis    Crohn's disease of small intestine without complication (H)    Morbid obesity (H)    Type 2 diabetes mellitus with hyperglycemia (H)    Hyperlipidemia LDL goal <100    PVD (posterior vitreous detachment), right    Vitreomacular traction syndrome of left eye    Dry eye    Primary osteoarthritis of left knee       Current Outpatient Medications   Medication Sig Dispense Refill    atorvastatin (LIPITOR) 40 MG tablet Take 1 tablet (40 mg) by mouth every evening. 90 tablet 11    balsalazide (COLAZAL) 750 MG capsule Take 3 capsules by mouth every 12 hours.      blood glucose (ACCU-CHEK GUIDE TEST) test strip USE TO TEST BLOOD SUGAR 1 TIMES DAILY OR AS DIRECTED. 100 strip 3    famotidine (PEPCID) 40 MG tablet Take 1 tablet (40 mg) by mouth 2 times daily as needed for heartburn. 180 tablet 11    levothyroxine (SYNTHROID/LEVOTHROID) 150 MCG tablet TAKE 1 TABLET BY MOUTH EVERY DAY 90 tablet 11    lisinopril (ZESTRIL) 10 MG tablet Take 1 tablet (10 mg) by mouth daily. 90 tablet 11    thin (NO BRAND SPECIFIED) lancets Use with lanceting device. To accompany: Blood Glucose Monitor Brands: per insurance. 100 each 6     No current facility-administered medications for this visit.       Allergies   Allergen Reactions    Peanut-Containing Drug Products     Seasonal Allergies        Past Medical History:   Diagnosis Date    Acid reflux     Chest pain, unspecified 10/01/2004    adenosine cardiolyte:ECG negative, no ischemia, EF 59%    Coronary artery disease     Crohn's disease (H)     Crohn's disease (H)     Esophageal reflux     EGD 2005 normal    Hereditary progressive muscular dystrophy (H)     HTN (hypertension)     Hypertension     Hypothyroidism     Internal hemorrhoids without mention of  "complication     Motor neuron disease (H)     Type 2 diabetes mellitus with hyperglycemia (H) 10/16/2023    Unspecified tinnitus     chronic, ENT evaluation        Review of Systems  ROS:   Specifically negative for bowel/bladder dysfunction, balance changes, headache, dizziness, foot drop, fevers, chills, appetite changes, nausea/vomiting, unexplained weight loss. Otherwise 13 systems reviewed are negative. Please see the patient's intake questionnaire from today for details.    Reviewed Social, Family, Past Medical and Past Surgical history with patient, no significant changes noted since prior visit.     Objective:     BP (!) 148/75 (BP Location: Left arm, Patient Position: Sitting, Cuff Size: Adult Regular)   Pulse 60   Ht 5' 10\" (1.778 m)   Wt 250 lb (113.4 kg)   SpO2 95%   BMI 35.87 kg/m      PHYSICAL EXAMINATION:    --CONSTITUTIONAL: Well developed, well nourished, healthy appearing individual.  --PSYCHIATRIC: Appropriate mood and affect. No difficulty interacting due to temper, social withdrawal, or memory issues.  --SKIN: Knee region is clean and dry and intact.  --RESPIRATORY: Normal rhythm and effort. No abnormal accessory muscle breathing patterns noted.   --GROSS MOTOR: Easily arises from a seated position. Gait is antalgic  --LUMBAR SPINE:  Inspection reveals no evidence of deformity. Range of motion is mildly limited in lumbar flexion, extension, lateral rotation.   --SACROILIAC JOINT:  One Finger point test negative.  --NEUROLOGIC:  Sensation to light touch is intact in the bilateral L4, L5, and S1 dermatomes.    RESULTS:   Imaging: Lumbar spine and knee imaging was reviewed today.          ;  "

## 2025-06-02 ENCOUNTER — PATIENT OUTREACH (OUTPATIENT)
Dept: CARE COORDINATION | Facility: CLINIC | Age: 70
End: 2025-06-02
Payer: COMMERCIAL

## 2025-06-09 ENCOUNTER — TELEPHONE (OUTPATIENT)
Dept: PHYSICAL MEDICINE AND REHAB | Facility: CLINIC | Age: 70
End: 2025-06-09
Payer: COMMERCIAL

## 2025-06-09 NOTE — TELEPHONE ENCOUNTER
PSP:  Dr. Cruz   Last clinic visit:  5/29/25  Reason for call: States [facility] does not have the referral for him to see someone regarding his knee  Clinical information:  Chart reviewed. Order was placed.  Advice given to patient: Explained we will refax to [facility]. Attention [facility]

## 2025-07-27 ENCOUNTER — HEALTH MAINTENANCE LETTER (OUTPATIENT)
Age: 70
End: 2025-07-27

## 2025-08-15 ENCOUNTER — TRANSFERRED RECORDS (OUTPATIENT)
Dept: GASTROENTEROLOGY | Facility: CLINIC | Age: 70
End: 2025-08-15
Payer: COMMERCIAL

## 2025-08-18 PROBLEM — D12.6 ADENOMATOUS POLYP OF COLON: Status: ACTIVE | Noted: 2025-08-18

## 2025-08-19 ENCOUNTER — PATIENT OUTREACH (OUTPATIENT)
Dept: GASTROENTEROLOGY | Facility: CLINIC | Age: 70
End: 2025-08-19
Payer: COMMERCIAL